# Patient Record
Sex: FEMALE | Race: WHITE | Employment: FULL TIME | URBAN - METROPOLITAN AREA
[De-identification: names, ages, dates, MRNs, and addresses within clinical notes are randomized per-mention and may not be internally consistent; named-entity substitution may affect disease eponyms.]

---

## 2017-01-03 ENCOUNTER — TELEPHONE (OUTPATIENT)
Dept: FAMILY MEDICINE CLINIC | Age: 32
End: 2017-01-03

## 2017-01-25 ENCOUNTER — OFFICE VISIT (OUTPATIENT)
Dept: ORTHOPEDIC SURGERY | Age: 32
End: 2017-01-25

## 2017-01-25 VITALS
WEIGHT: 293 LBS | DIASTOLIC BLOOD PRESSURE: 75 MMHG | SYSTOLIC BLOOD PRESSURE: 115 MMHG | HEART RATE: 79 BPM | HEIGHT: 65 IN | BODY MASS INDEX: 48.82 KG/M2 | RESPIRATION RATE: 16 BRPM

## 2017-01-25 DIAGNOSIS — G56.01 CARPAL TUNNEL SYNDROME OF RIGHT WRIST: Primary | ICD-10-CM

## 2017-01-25 PROCEDURE — 20526 THER INJECTION CARP TUNNEL: CPT | Performed by: ORTHOPAEDIC SURGERY

## 2017-01-25 PROCEDURE — 99243 OFF/OP CNSLTJ NEW/EST LOW 30: CPT | Performed by: ORTHOPAEDIC SURGERY

## 2017-01-25 RX ORDER — METHOCARBAMOL 500 MG/1
500 TABLET, FILM COATED ORAL 4 TIMES DAILY PRN
COMMUNITY
Start: 2016-11-17 | End: 2017-08-28

## 2017-01-25 RX ORDER — FERROUS SULFATE 325(65) MG
325 TABLET ORAL
COMMUNITY
End: 2017-08-28

## 2017-01-25 RX ORDER — CYCLOBENZAPRINE HCL 10 MG
TABLET ORAL
COMMUNITY
Start: 2016-12-15 | End: 2017-05-15

## 2017-02-21 ENCOUNTER — TELEPHONE (OUTPATIENT)
Dept: FAMILY MEDICINE CLINIC | Age: 32
End: 2017-02-21

## 2017-02-21 DIAGNOSIS — E03.9 ACQUIRED HYPOTHYROIDISM: Primary | ICD-10-CM

## 2017-02-22 DIAGNOSIS — E03.9 ACQUIRED HYPOTHYROIDISM: ICD-10-CM

## 2017-02-23 LAB
T4 TOTAL: 7.1 UG/DL (ref 4.5–10.9)
TSH REFLEX: 2.58 UIU/ML (ref 0.27–4.2)

## 2017-02-23 RX ORDER — LEVOTHYROXINE SODIUM 88 UG/1
88 TABLET ORAL DAILY
Qty: 30 TABLET | Refills: 5 | Status: SHIPPED | OUTPATIENT
Start: 2017-02-23 | End: 2017-02-27 | Stop reason: SDUPTHER

## 2017-02-25 ENCOUNTER — PATIENT MESSAGE (OUTPATIENT)
Dept: FAMILY MEDICINE CLINIC | Age: 32
End: 2017-02-25

## 2017-02-27 RX ORDER — LEVOTHYROXINE SODIUM 88 UG/1
88 TABLET ORAL DAILY
Qty: 30 TABLET | Refills: 5 | Status: SHIPPED | OUTPATIENT
Start: 2017-02-27 | End: 2017-08-07 | Stop reason: SDUPTHER

## 2017-04-04 ENCOUNTER — TELEPHONE (OUTPATIENT)
Dept: BARIATRICS/WEIGHT MGMT | Age: 32
End: 2017-04-04

## 2017-04-11 ENCOUNTER — OFFICE VISIT (OUTPATIENT)
Dept: BARIATRICS/WEIGHT MGMT | Age: 32
End: 2017-04-11

## 2017-04-11 VITALS
SYSTOLIC BLOOD PRESSURE: 139 MMHG | DIASTOLIC BLOOD PRESSURE: 82 MMHG | HEART RATE: 81 BPM | WEIGHT: 288.5 LBS | BODY MASS INDEX: 48.07 KG/M2 | RESPIRATION RATE: 16 BRPM | HEIGHT: 65 IN

## 2017-04-11 DIAGNOSIS — E28.2 PCOS (POLYCYSTIC OVARIAN SYNDROME): ICD-10-CM

## 2017-04-11 DIAGNOSIS — G47.33 SLEEP APNEA, OBSTRUCTIVE: ICD-10-CM

## 2017-04-11 DIAGNOSIS — E66.01 MORBID OBESITY WITH BMI OF 45.0-49.9, ADULT (HCC): Primary | ICD-10-CM

## 2017-04-11 PROBLEM — R32 URINARY INCONTINENCE: Status: ACTIVE | Noted: 2017-04-11

## 2017-04-11 PROBLEM — M54.9 BACK PAIN: Status: ACTIVE | Noted: 2017-04-11

## 2017-04-11 PROCEDURE — 99244 OFF/OP CNSLTJ NEW/EST MOD 40: CPT | Performed by: SURGERY

## 2017-04-21 ENCOUNTER — HOSPITAL ENCOUNTER (OUTPATIENT)
Dept: OTHER | Age: 32
Discharge: HOME | End: 2017-04-21
Attending: SURGERY | Admitting: SURGERY

## 2017-04-21 DIAGNOSIS — G47.33 SLEEP APNEA, OBSTRUCTIVE: ICD-10-CM

## 2017-04-21 DIAGNOSIS — E66.01 MORBID OBESITY WITH BMI OF 45.0-49.9, ADULT (HCC): ICD-10-CM

## 2017-04-21 DIAGNOSIS — E28.2 PCOS (POLYCYSTIC OVARIAN SYNDROME): ICD-10-CM

## 2017-04-21 LAB
A/G RATIO: 1.4 (ref 1.1–2.2)
ALBUMIN SERPL-MCNC: 4.5 G/DL (ref 3.4–5)
ALP BLD-CCNC: 37 U/L (ref 40–129)
ALT SERPL-CCNC: 51 U/L (ref 10–40)
ANION GAP SERPL CALCULATED.3IONS-SCNC: 16 MMOL/L (ref 3–16)
AST SERPL-CCNC: 23 U/L (ref 15–37)
BASOPHILS ABSOLUTE: 0.1 K/UL (ref 0–0.2)
BASOPHILS RELATIVE PERCENT: 1.1 %
BILIRUB SERPL-MCNC: 0.4 MG/DL (ref 0–1)
BUN BLDV-MCNC: 11 MG/DL (ref 7–20)
CALCIUM SERPL-MCNC: 9.5 MG/DL (ref 8.3–10.6)
CHLORIDE BLD-SCNC: 101 MMOL/L (ref 99–110)
CHOLESTEROL, TOTAL: 210 MG/DL (ref 0–199)
CO2: 25 MMOL/L (ref 21–32)
CREAT SERPL-MCNC: 0.6 MG/DL (ref 0.6–1.1)
EOSINOPHILS ABSOLUTE: 0.2 K/UL (ref 0–0.6)
EOSINOPHILS RELATIVE PERCENT: 2.1 %
FOLATE: >20 NG/ML (ref 4.78–24.2)
GFR AFRICAN AMERICAN: >60
GFR NON-AFRICAN AMERICAN: >60
GLOBULIN: 3.3 G/DL
GLUCOSE BLD-MCNC: 109 MG/DL (ref 70–99)
HCT VFR BLD CALC: 39.9 % (ref 36–48)
HDLC SERPL-MCNC: 40 MG/DL (ref 40–60)
HEMOGLOBIN: 13.1 G/DL (ref 12–16)
IRON SATURATION: 15 % (ref 15–50)
IRON: 69 UG/DL (ref 37–145)
LDL CHOLESTEROL CALCULATED: 144 MG/DL
LYMPHOCYTES ABSOLUTE: 2.2 K/UL (ref 1–5.1)
LYMPHOCYTES RELATIVE PERCENT: 30.2 %
MCH RBC QN AUTO: 27.7 PG (ref 26–34)
MCHC RBC AUTO-ENTMCNC: 32.9 G/DL (ref 31–36)
MCV RBC AUTO: 84.2 FL (ref 80–100)
MONOCYTES ABSOLUTE: 0.4 K/UL (ref 0–1.3)
MONOCYTES RELATIVE PERCENT: 5.6 %
NEUTROPHILS ABSOLUTE: 4.5 K/UL (ref 1.7–7.7)
NEUTROPHILS RELATIVE PERCENT: 61 %
PDW BLD-RTO: 13.6 % (ref 12.4–15.4)
PLATELET # BLD: 285 K/UL (ref 135–450)
PMV BLD AUTO: 8.1 FL (ref 5–10.5)
POTASSIUM SERPL-SCNC: 4.7 MMOL/L (ref 3.5–5.1)
RBC # BLD: 4.73 M/UL (ref 4–5.2)
SODIUM BLD-SCNC: 142 MMOL/L (ref 136–145)
TOTAL IRON BINDING CAPACITY: 453 UG/DL (ref 260–445)
TOTAL PROTEIN: 7.8 G/DL (ref 6.4–8.2)
TRIGL SERPL-MCNC: 128 MG/DL (ref 0–150)
TSH REFLEX: 1.2 UIU/ML (ref 0.27–4.2)
VITAMIN B-12: 436 PG/ML (ref 211–911)
VITAMIN D 25-HYDROXY: 31 NG/ML
VLDLC SERPL CALC-MCNC: 26 MG/DL
WBC # BLD: 7.3 K/UL (ref 4–11)

## 2017-04-22 LAB
ESTIMATED AVERAGE GLUCOSE: 128.4 MG/DL
HBA1C MFR BLD: 6.1 %

## 2017-05-07 LAB — H PYLORI ANTIGEN STOOL: NEGATIVE

## 2017-05-10 ENCOUNTER — OFFICE VISIT (OUTPATIENT)
Dept: ORTHOPEDIC SURGERY | Age: 32
End: 2017-05-10

## 2017-05-10 VITALS
HEART RATE: 85 BPM | WEIGHT: 288 LBS | SYSTOLIC BLOOD PRESSURE: 137 MMHG | BODY MASS INDEX: 43.65 KG/M2 | DIASTOLIC BLOOD PRESSURE: 88 MMHG | HEIGHT: 68 IN

## 2017-05-10 DIAGNOSIS — G56.01 CARPAL TUNNEL SYNDROME OF RIGHT WRIST: ICD-10-CM

## 2017-05-10 PROCEDURE — 99214 OFFICE O/P EST MOD 30 MIN: CPT | Performed by: ORTHOPAEDIC SURGERY

## 2017-05-11 ENCOUNTER — OFFICE VISIT (OUTPATIENT)
Dept: FAMILY MEDICINE CLINIC | Age: 32
End: 2017-05-11

## 2017-05-11 VITALS
HEIGHT: 65 IN | BODY MASS INDEX: 47.15 KG/M2 | RESPIRATION RATE: 16 BRPM | WEIGHT: 283 LBS | OXYGEN SATURATION: 99 % | DIASTOLIC BLOOD PRESSURE: 74 MMHG | HEART RATE: 97 BPM | SYSTOLIC BLOOD PRESSURE: 124 MMHG

## 2017-05-11 DIAGNOSIS — E66.01 MORBID OBESITY WITH BMI OF 45.0-49.9, ADULT (HCC): Primary | ICD-10-CM

## 2017-05-11 PROCEDURE — 99212 OFFICE O/P EST SF 10 MIN: CPT | Performed by: NURSE PRACTITIONER

## 2017-05-11 ASSESSMENT — PATIENT HEALTH QUESTIONNAIRE - PHQ9
SUM OF ALL RESPONSES TO PHQ QUESTIONS 1-9: 0
SUM OF ALL RESPONSES TO PHQ9 QUESTIONS 1 & 2: 0
2. FEELING DOWN, DEPRESSED OR HOPELESS: 0
1. LITTLE INTEREST OR PLEASURE IN DOING THINGS: 0

## 2017-05-15 ENCOUNTER — HOSPITAL ENCOUNTER (OUTPATIENT)
Dept: OTHER | Age: 32
Discharge: HOME | End: 2017-05-15
Attending: SURGERY | Admitting: SURGERY

## 2017-05-15 ENCOUNTER — OFFICE VISIT (OUTPATIENT)
Dept: BARIATRICS/WEIGHT MGMT | Age: 32
End: 2017-05-15

## 2017-05-15 ENCOUNTER — OFFICE VISIT (OUTPATIENT)
Dept: FAMILY MEDICINE CLINIC | Age: 32
End: 2017-05-15

## 2017-05-15 VITALS
HEIGHT: 65 IN | RESPIRATION RATE: 16 BRPM | WEIGHT: 283 LBS | BODY MASS INDEX: 47.15 KG/M2 | HEART RATE: 96 BPM | DIASTOLIC BLOOD PRESSURE: 68 MMHG | SYSTOLIC BLOOD PRESSURE: 132 MMHG

## 2017-05-15 VITALS
BODY MASS INDEX: 46.98 KG/M2 | RESPIRATION RATE: 16 BRPM | SYSTOLIC BLOOD PRESSURE: 116 MMHG | TEMPERATURE: 98.7 F | OXYGEN SATURATION: 99 % | WEIGHT: 282 LBS | HEART RATE: 90 BPM | HEIGHT: 65 IN | DIASTOLIC BLOOD PRESSURE: 82 MMHG

## 2017-05-15 DIAGNOSIS — E66.01 MORBID OBESITY WITH BMI OF 45.0-49.9, ADULT (HCC): ICD-10-CM

## 2017-05-15 DIAGNOSIS — G47.33 SLEEP APNEA, OBSTRUCTIVE: ICD-10-CM

## 2017-05-15 DIAGNOSIS — G56.01 CARPAL TUNNEL SYNDROME OF RIGHT WRIST: ICD-10-CM

## 2017-05-15 DIAGNOSIS — R73.03 PREDIABETES: ICD-10-CM

## 2017-05-15 DIAGNOSIS — E66.01 MORBID OBESITY WITH BMI OF 45.0-49.9, ADULT (HCC): Primary | ICD-10-CM

## 2017-05-15 DIAGNOSIS — Z01.818 PRE-OP EXAMINATION: Primary | ICD-10-CM

## 2017-05-15 DIAGNOSIS — E28.2 PCOS (POLYCYSTIC OVARIAN SYNDROME): ICD-10-CM

## 2017-05-15 PROCEDURE — 99213 OFFICE O/P EST LOW 20 MIN: CPT | Performed by: NURSE PRACTITIONER

## 2017-05-15 PROCEDURE — 99243 OFF/OP CNSLTJ NEW/EST LOW 30: CPT | Performed by: NURSE PRACTITIONER

## 2017-05-15 ASSESSMENT — ENCOUNTER SYMPTOMS
ALLERGIC/IMMUNOLOGIC NEGATIVE: 1
EYES NEGATIVE: 1
BACK PAIN: 1
RESPIRATORY NEGATIVE: 1
GASTROINTESTINAL NEGATIVE: 1

## 2017-05-19 ENCOUNTER — PAT TELEPHONE (OUTPATIENT)
Dept: PREADMISSION TESTING | Age: 32
End: 2017-05-19

## 2017-05-19 VITALS — HEIGHT: 65 IN | WEIGHT: 282 LBS | BODY MASS INDEX: 46.98 KG/M2

## 2017-05-19 ASSESSMENT — PAIN SCALES - GENERAL: PAINLEVEL_OUTOF10: 6

## 2017-05-19 ASSESSMENT — PAIN DESCRIPTION - LOCATION: LOCATION: BACK

## 2017-05-19 ASSESSMENT — PAIN DESCRIPTION - PAIN TYPE: TYPE: ACUTE PAIN

## 2017-05-19 ASSESSMENT — PAIN DESCRIPTION - DESCRIPTORS: DESCRIPTORS: ACHING;TINGLING;NUMBNESS

## 2017-05-19 ASSESSMENT — PAIN DESCRIPTION - ORIENTATION: ORIENTATION: UPPER;MID

## 2017-05-19 ASSESSMENT — PAIN - FUNCTIONAL ASSESSMENT: PAIN_FUNCTIONAL_ASSESSMENT: 0-10

## 2017-05-23 ENCOUNTER — HOSPITAL ENCOUNTER (OUTPATIENT)
Dept: SURGERY | Age: 32
Discharge: HOME | End: 2017-05-23
Attending: ORTHOPAEDIC SURGERY | Admitting: ORTHOPAEDIC SURGERY

## 2017-05-23 VITALS
HEART RATE: 66 BPM | RESPIRATION RATE: 14 BRPM | SYSTOLIC BLOOD PRESSURE: 147 MMHG | OXYGEN SATURATION: 98 % | TEMPERATURE: 97.2 F | DIASTOLIC BLOOD PRESSURE: 89 MMHG

## 2017-05-23 LAB — PREGNANCY, URINE: NEGATIVE

## 2017-05-23 RX ORDER — ONDANSETRON 2 MG/ML
4 INJECTION INTRAMUSCULAR; INTRAVENOUS
Status: ACTIVE | OUTPATIENT
Start: 2017-05-23 | End: 2017-05-23

## 2017-05-23 RX ORDER — OXYCODONE HYDROCHLORIDE 5 MG/1
10 TABLET ORAL PRN
Status: ACTIVE | OUTPATIENT
Start: 2017-05-23 | End: 2017-05-23

## 2017-05-23 RX ORDER — OXYCODONE HYDROCHLORIDE 5 MG/1
5 TABLET ORAL PRN
Status: ACTIVE | OUTPATIENT
Start: 2017-05-23 | End: 2017-05-23

## 2017-05-23 RX ORDER — SODIUM CHLORIDE 0.9 % (FLUSH) 0.9 %
10 SYRINGE (ML) INJECTION EVERY 12 HOURS SCHEDULED
Status: DISCONTINUED | OUTPATIENT
Start: 2017-05-23 | End: 2017-05-24 | Stop reason: HOSPADM

## 2017-05-23 RX ORDER — MORPHINE SULFATE 2 MG/ML
1 INJECTION, SOLUTION INTRAMUSCULAR; INTRAVENOUS EVERY 5 MIN PRN
Status: DISCONTINUED | OUTPATIENT
Start: 2017-05-23 | End: 2017-05-24 | Stop reason: HOSPADM

## 2017-05-23 RX ORDER — MEPERIDINE HYDROCHLORIDE 25 MG/ML
12.5 INJECTION INTRAMUSCULAR; INTRAVENOUS; SUBCUTANEOUS EVERY 5 MIN PRN
Status: DISCONTINUED | OUTPATIENT
Start: 2017-05-23 | End: 2017-05-24 | Stop reason: HOSPADM

## 2017-05-23 RX ORDER — FENTANYL CITRATE 50 UG/ML
50 INJECTION, SOLUTION INTRAMUSCULAR; INTRAVENOUS EVERY 5 MIN PRN
Status: DISCONTINUED | OUTPATIENT
Start: 2017-05-23 | End: 2017-05-24 | Stop reason: HOSPADM

## 2017-05-23 RX ORDER — SODIUM CHLORIDE 9 MG/ML
INJECTION, SOLUTION INTRAVENOUS CONTINUOUS
Status: DISCONTINUED | OUTPATIENT
Start: 2017-05-23 | End: 2017-05-24 | Stop reason: HOSPADM

## 2017-05-23 RX ORDER — FENTANYL CITRATE 50 UG/ML
25 INJECTION, SOLUTION INTRAMUSCULAR; INTRAVENOUS EVERY 5 MIN PRN
Status: DISCONTINUED | OUTPATIENT
Start: 2017-05-23 | End: 2017-05-24 | Stop reason: HOSPADM

## 2017-05-23 RX ORDER — MORPHINE SULFATE 2 MG/ML
2 INJECTION, SOLUTION INTRAMUSCULAR; INTRAVENOUS EVERY 5 MIN PRN
Status: DISCONTINUED | OUTPATIENT
Start: 2017-05-23 | End: 2017-05-24 | Stop reason: HOSPADM

## 2017-05-23 RX ORDER — SODIUM CHLORIDE 0.9 % (FLUSH) 0.9 %
10 SYRINGE (ML) INJECTION PRN
Status: DISCONTINUED | OUTPATIENT
Start: 2017-05-23 | End: 2017-05-24 | Stop reason: HOSPADM

## 2017-05-23 RX ADMIN — SODIUM CHLORIDE: 9 INJECTION, SOLUTION INTRAVENOUS at 10:38

## 2017-05-23 ASSESSMENT — PAIN SCALES - GENERAL
PAINLEVEL_OUTOF10: 0

## 2017-05-23 ASSESSMENT — ENCOUNTER SYMPTOMS: SHORTNESS OF BREATH: 0

## 2017-05-23 ASSESSMENT — PAIN - FUNCTIONAL ASSESSMENT: PAIN_FUNCTIONAL_ASSESSMENT: 0-10

## 2017-06-02 ENCOUNTER — OFFICE VISIT (OUTPATIENT)
Dept: ORTHOPEDIC SURGERY | Age: 32
End: 2017-06-02

## 2017-06-02 VITALS — BODY MASS INDEX: 46.98 KG/M2 | RESPIRATION RATE: 16 BRPM | HEIGHT: 65 IN | WEIGHT: 282 LBS

## 2017-06-02 DIAGNOSIS — G56.01 CARPAL TUNNEL SYNDROME OF RIGHT WRIST: Primary | ICD-10-CM

## 2017-06-02 PROCEDURE — 99024 POSTOP FOLLOW-UP VISIT: CPT | Performed by: PHYSICIAN ASSISTANT

## 2017-06-19 ENCOUNTER — OFFICE VISIT (OUTPATIENT)
Dept: BARIATRICS/WEIGHT MGMT | Age: 32
End: 2017-06-19

## 2017-06-19 VITALS
SYSTOLIC BLOOD PRESSURE: 110 MMHG | HEART RATE: 66 BPM | DIASTOLIC BLOOD PRESSURE: 60 MMHG | WEIGHT: 288 LBS | BODY MASS INDEX: 47.98 KG/M2 | HEIGHT: 65 IN | RESPIRATION RATE: 16 BRPM

## 2017-06-19 DIAGNOSIS — G47.33 SLEEP APNEA, OBSTRUCTIVE: ICD-10-CM

## 2017-06-19 DIAGNOSIS — R73.03 PREDIABETES: ICD-10-CM

## 2017-06-19 DIAGNOSIS — E66.01 MORBID OBESITY WITH BMI OF 45.0-49.9, ADULT (HCC): Primary | ICD-10-CM

## 2017-06-19 PROCEDURE — 99213 OFFICE O/P EST LOW 20 MIN: CPT | Performed by: NURSE PRACTITIONER

## 2017-06-19 ASSESSMENT — ENCOUNTER SYMPTOMS
GASTROINTESTINAL NEGATIVE: 1
RESPIRATORY NEGATIVE: 1
ALLERGIC/IMMUNOLOGIC NEGATIVE: 1
EYES NEGATIVE: 1

## 2017-07-24 ENCOUNTER — OFFICE VISIT (OUTPATIENT)
Dept: BARIATRICS/WEIGHT MGMT | Age: 32
End: 2017-07-24

## 2017-07-24 VITALS
HEART RATE: 85 BPM | SYSTOLIC BLOOD PRESSURE: 132 MMHG | DIASTOLIC BLOOD PRESSURE: 82 MMHG | WEIGHT: 287 LBS | BODY MASS INDEX: 47.82 KG/M2 | HEIGHT: 65 IN

## 2017-07-24 DIAGNOSIS — E66.01 MORBID OBESITY WITH BMI OF 45.0-49.9, ADULT (HCC): Primary | ICD-10-CM

## 2017-07-24 DIAGNOSIS — R73.03 PREDIABETES: ICD-10-CM

## 2017-07-24 DIAGNOSIS — G47.33 SLEEP APNEA, OBSTRUCTIVE: ICD-10-CM

## 2017-07-24 DIAGNOSIS — E28.2 PCOS (POLYCYSTIC OVARIAN SYNDROME): ICD-10-CM

## 2017-07-24 PROCEDURE — 99213 OFFICE O/P EST LOW 20 MIN: CPT | Performed by: NURSE PRACTITIONER

## 2017-07-24 ASSESSMENT — ENCOUNTER SYMPTOMS
GASTROINTESTINAL NEGATIVE: 1
RESPIRATORY NEGATIVE: 1
EYES NEGATIVE: 1
ALLERGIC/IMMUNOLOGIC NEGATIVE: 1

## 2017-08-05 ENCOUNTER — TELEPHONE (OUTPATIENT)
Dept: FAMILY MEDICINE CLINIC | Age: 32
End: 2017-08-05

## 2017-08-07 RX ORDER — LEVOTHYROXINE SODIUM 88 UG/1
88 TABLET ORAL DAILY
Qty: 30 TABLET | Refills: 5 | Status: SHIPPED | OUTPATIENT
Start: 2017-08-07 | End: 2018-03-29 | Stop reason: SDUPTHER

## 2017-08-21 ENCOUNTER — OFFICE VISIT (OUTPATIENT)
Dept: BARIATRICS/WEIGHT MGMT | Age: 32
End: 2017-08-21

## 2017-08-21 VITALS
RESPIRATION RATE: 16 BRPM | HEIGHT: 65 IN | DIASTOLIC BLOOD PRESSURE: 86 MMHG | WEIGHT: 280 LBS | BODY MASS INDEX: 46.65 KG/M2 | HEART RATE: 98 BPM | SYSTOLIC BLOOD PRESSURE: 112 MMHG

## 2017-08-21 DIAGNOSIS — R73.03 PREDIABETES: ICD-10-CM

## 2017-08-21 DIAGNOSIS — E66.01 MORBID OBESITY WITH BMI OF 45.0-49.9, ADULT (HCC): Primary | ICD-10-CM

## 2017-08-21 DIAGNOSIS — G47.33 SLEEP APNEA, OBSTRUCTIVE: ICD-10-CM

## 2017-08-21 PROCEDURE — 99213 OFFICE O/P EST LOW 20 MIN: CPT | Performed by: NURSE PRACTITIONER

## 2017-08-21 ASSESSMENT — ENCOUNTER SYMPTOMS
EYES NEGATIVE: 1
RESPIRATORY NEGATIVE: 1
GASTROINTESTINAL NEGATIVE: 1
ALLERGIC/IMMUNOLOGIC NEGATIVE: 1

## 2017-08-28 ENCOUNTER — OFFICE VISIT (OUTPATIENT)
Dept: FAMILY MEDICINE CLINIC | Age: 32
End: 2017-08-28

## 2017-08-28 VITALS
WEIGHT: 284.4 LBS | HEIGHT: 65 IN | BODY MASS INDEX: 47.38 KG/M2 | HEART RATE: 82 BPM | RESPIRATION RATE: 16 BRPM | OXYGEN SATURATION: 97 % | DIASTOLIC BLOOD PRESSURE: 60 MMHG | SYSTOLIC BLOOD PRESSURE: 104 MMHG | TEMPERATURE: 97.9 F

## 2017-08-28 DIAGNOSIS — Z00.00 WELL WOMAN EXAM WITHOUT GYNECOLOGICAL EXAM: Primary | ICD-10-CM

## 2017-08-28 DIAGNOSIS — G89.29 CHRONIC MIDLINE THORACIC BACK PAIN: ICD-10-CM

## 2017-08-28 DIAGNOSIS — M54.6 CHRONIC MIDLINE THORACIC BACK PAIN: ICD-10-CM

## 2017-08-28 DIAGNOSIS — E66.01 MORBID OBESITY WITH BMI OF 45.0-49.9, ADULT (HCC): ICD-10-CM

## 2017-08-28 DIAGNOSIS — E03.9 ACQUIRED HYPOTHYROIDISM: ICD-10-CM

## 2017-08-28 PROCEDURE — 99395 PREV VISIT EST AGE 18-39: CPT | Performed by: NURSE PRACTITIONER

## 2017-08-28 PROCEDURE — 90471 IMMUNIZATION ADMIN: CPT | Performed by: NURSE PRACTITIONER

## 2017-08-28 PROCEDURE — 90688 IIV4 VACCINE SPLT 0.5 ML IM: CPT | Performed by: NURSE PRACTITIONER

## 2017-08-28 RX ORDER — CYCLOBENZAPRINE HCL 5 MG
5 TABLET ORAL 3 TIMES DAILY PRN
Qty: 30 TABLET | Refills: 0 | Status: SHIPPED | OUTPATIENT
Start: 2017-08-28 | End: 2020-06-19

## 2017-09-20 ENCOUNTER — TELEPHONE (OUTPATIENT)
Dept: BARIATRICS/WEIGHT MGMT | Age: 32
End: 2017-09-20

## 2017-09-25 ENCOUNTER — OFFICE VISIT (OUTPATIENT)
Dept: BARIATRICS/WEIGHT MGMT | Age: 32
End: 2017-09-25

## 2017-09-25 VITALS
HEART RATE: 80 BPM | SYSTOLIC BLOOD PRESSURE: 114 MMHG | BODY MASS INDEX: 46.82 KG/M2 | HEIGHT: 65 IN | RESPIRATION RATE: 16 BRPM | WEIGHT: 281 LBS | DIASTOLIC BLOOD PRESSURE: 74 MMHG

## 2017-09-25 DIAGNOSIS — R73.03 PREDIABETES: ICD-10-CM

## 2017-09-25 DIAGNOSIS — E78.5 HYPERLIPIDEMIA, UNSPECIFIED: ICD-10-CM

## 2017-09-25 DIAGNOSIS — E28.2 PCOS (POLYCYSTIC OVARIAN SYNDROME): ICD-10-CM

## 2017-09-25 DIAGNOSIS — E66.01 MORBID OBESITY WITH BMI OF 45.0-49.9, ADULT (HCC): Primary | ICD-10-CM

## 2017-09-25 DIAGNOSIS — G47.33 SLEEP APNEA, OBSTRUCTIVE: ICD-10-CM

## 2017-09-25 PROCEDURE — 99213 OFFICE O/P EST LOW 20 MIN: CPT | Performed by: NURSE PRACTITIONER

## 2017-09-25 ASSESSMENT — ENCOUNTER SYMPTOMS
RESPIRATORY NEGATIVE: 1
ALLERGIC/IMMUNOLOGIC NEGATIVE: 1
EYES NEGATIVE: 1
GASTROINTESTINAL NEGATIVE: 1

## 2017-10-16 ENCOUNTER — TELEPHONE (OUTPATIENT)
Dept: BARIATRICS/WEIGHT MGMT | Age: 32
End: 2017-10-16

## 2017-10-16 ENCOUNTER — OFFICE VISIT (OUTPATIENT)
Dept: FAMILY MEDICINE CLINIC | Age: 32
End: 2017-10-16

## 2017-10-16 VITALS
BODY MASS INDEX: 48.05 KG/M2 | OXYGEN SATURATION: 99 % | WEIGHT: 288.4 LBS | SYSTOLIC BLOOD PRESSURE: 116 MMHG | RESPIRATION RATE: 17 BRPM | HEIGHT: 65 IN | DIASTOLIC BLOOD PRESSURE: 74 MMHG | HEART RATE: 77 BPM

## 2017-10-16 DIAGNOSIS — M25.512 ACUTE PAIN OF LEFT SHOULDER: ICD-10-CM

## 2017-10-16 DIAGNOSIS — K58.0 IRRITABLE BOWEL SYNDROME WITH DIARRHEA: ICD-10-CM

## 2017-10-16 DIAGNOSIS — M25.551 RIGHT HIP PAIN: Primary | ICD-10-CM

## 2017-10-16 PROCEDURE — 90471 IMMUNIZATION ADMIN: CPT | Performed by: NURSE PRACTITIONER

## 2017-10-16 PROCEDURE — 99214 OFFICE O/P EST MOD 30 MIN: CPT | Performed by: NURSE PRACTITIONER

## 2017-10-16 PROCEDURE — 90686 IIV4 VACC NO PRSV 0.5 ML IM: CPT | Performed by: NURSE PRACTITIONER

## 2017-10-16 RX ORDER — DIPHENOXYLATE HYDROCHLORIDE AND ATROPINE SULFATE 2.5; .025 MG/1; MG/1
1 TABLET ORAL 3 TIMES DAILY PRN
Qty: 30 TABLET | Refills: 0 | Status: SHIPPED | OUTPATIENT
Start: 2017-10-16 | End: 2017-11-20 | Stop reason: SDUPTHER

## 2017-10-16 RX ORDER — ACETAMINOPHEN 325 MG/1
650 TABLET ORAL PRN
COMMUNITY

## 2017-10-16 ASSESSMENT — ENCOUNTER SYMPTOMS
VOMITING: 0
DIARRHEA: 1
BLOOD IN STOOL: 0
BACK PAIN: 0
NAUSEA: 0
SHORTNESS OF BREATH: 0
ABDOMINAL PAIN: 0

## 2017-10-16 NOTE — TELEPHONE ENCOUNTER
[facility] approval for sleeve started  DOS 11/27/17  CPT [phone]    Nurse reviewer will call with fax number

## 2017-10-16 NOTE — PATIENT INSTRUCTIONS
and treatment. Follow-up care is a key part of your treatment and safety. Be sure to make and go to all appointments, and call your doctor if you are having problems. It's also a good idea to know your test results and keep a list of the medicines you take. How can you care for yourself at home? · Take pain medicines exactly as directed. ¨ If the doctor gave you a prescription medicine for pain, take it as prescribed. ¨ If you are not taking a prescription pain medicine, ask your doctor if you can take an over-the-counter medicine. ¨ Do not take two or more pain medicines at the same time unless the doctor told you to. Many pain medicines contain acetaminophen, which is Tylenol. Too much acetaminophen (Tylenol) can be harmful. · If your doctor recommends that you wear a sling, use it as directed. Do not take it off before your doctor tells you to. · Put ice or a cold pack on the sore area for 10 to 20 minutes at a time. Put a thin cloth between the ice and your skin. · If there is no swelling, you can put moist heat, a heating pad, or a warm cloth on your shoulder. Some doctors suggest alternating between hot and cold. · Rest your shoulder for a few days. If your doctor recommends it, you can then begin gentle exercise of the shoulder, but do not lift anything heavy. When should you call for help? Call 911 anytime you think you may need emergency care. For example, call if:  · You have chest pain or pressure. This may occur with:  ¨ Sweating. ¨ Shortness of breath. ¨ Nausea or vomiting. ¨ Pain that spreads from the chest to the neck, jaw, or one or both shoulders or arms. ¨ Dizziness or lightheadedness. ¨ A fast or uneven pulse. After calling 911, chew 1 adult-strength aspirin. Wait for an ambulance. Do not try to drive yourself. · Your arm or hand is cool or pale or changes color.   Call your doctor now or seek immediate medical care if:  · You have signs of infection, such as:  ¨ Increased pain, swelling, warmth, or redness in your shoulder. ¨ Red streaks leading from a place on your shoulder. ¨ Pus draining from an area of your shoulder. ¨ Swollen lymph nodes in your neck, armpits, or groin. ¨ A fever. Watch closely for changes in your health, and be sure to contact your doctor if:  · You cannot use your shoulder. · Your shoulder does not get better as expected. Where can you learn more? Go to https://[employer]ackerHAND. org and sign in to your [employer] account. Enter D793 in the Immediately box to learn more about \"Shoulder Pain: Care Instructions. \"     If you do not have an account, please click on the \"Sign Up Now\" link. Current as of: March 21, 2017  Content Version: 11.3  © 4840-1953 [employer]. Care instructions adapted under license by [facility] ([facility]). If you have questions about a medical condition or this instruction, always ask your healthcare professional. [place] [address] any warranty or liability for your use of this information.

## 2017-10-16 NOTE — PROGRESS NOTES
SUBJECTIVE:  Pt is a of 29 y.o. female comes in today with   Chief Complaint   Patient presents with    Hip Pain     x 2 months, R, stiff and painful after sitting down for a while, centralized in joint, does not radiate down leg    Shoulder Pain     x 1 month, L, hurts after lifting over head       Patient presenting today for evaluation of right hip pain. Right hip pain: present for 2 months. Denies injury. Pain intermittent throughout the day. Can readjust and get pain free for short while. Worsens when first standing and walking. Takes 5-10 steps to improve. Pain radiates to upper lateral thigh and groin. Occasional numbness and tingling in leg. No recent eval. Tyl with moderate improvement. Left shoulder pain: started approx 4 weeks ago. Severe last week, slightly better now. Pain increases when lifting son or raising arm above head. Worsened after doing her hair and mother's hair for wedding over the weekend. Severe pain when reaching behind back. Denies CP, SOB or palpitations. C/o diarrhea since gb was removed. Relates to IBS. At times not controlled by Imodium. Denies abd pain or weight loss. Denies mucus or blood in stool. No previous eval.      Prior to Visit Medications    Medication Sig Taking? Authorizing Provider   acetaminophen (TYLENOL) 325 MG tablet Take 650 mg by mouth as needed for Pain Yes Historical Provider, MD   cyclobenzaprine (FLEXERIL) 5 MG tablet Take 1 tablet by mouth 3 times daily as needed for Muscle spasms Yes Pawel Mcleod CNP   levothyroxine (SYNTHROID) 88 MCG tablet Take 1 tablet by mouth daily Yes Pawel Mcleod CNP   Levonorgestrel (MIRENA, 52 MG, IU) by Intrauterine route Yes Historical Provider, MD   Prenatal Vit-Fe Fumarate-FA (PRENATAL VITAMIN PO) Take 1 capsule by mouth daily  Yes Historical Provider, MD         Patient's allergies, past medical, surgical, social and family histories were reviewed and updated as appropriate.       Review of Systems Constitutional: Negative for fever. Respiratory: Negative for shortness of breath. Cardiovascular: Negative for chest pain. Gastrointestinal: Positive for diarrhea. Negative for abdominal pain, blood in stool, melena, nausea and vomiting. Musculoskeletal: Positive for joint pain (right hip pain). Negative for back pain. Left shoulder pain     Neurological: Negative for tingling. Physical Exam   Constitutional: She is oriented to person, place, and time. She appears well-developed and well-nourished. Cardiovascular: Normal rate, regular rhythm and normal heart sounds. Pulmonary/Chest: Effort normal and breath sounds normal.   Abdominal: Soft. Normal appearance and bowel sounds are normal. There is no tenderness. There is no rigidity, no rebound and no guarding. Musculoskeletal:        Left shoulder: She exhibits decreased range of motion and pain. She exhibits no tenderness, no swelling, normal pulse and normal strength. Right hip: She exhibits decreased range of motion and tenderness. She exhibits normal strength and no swelling. Neurological: She is alert and oriented to person, place, and time. Skin: Skin is warm and dry. Psychiatric: She has a normal mood and affect. Her behavior is normal.   Vitals reviewed. Vitals:    10/16/17 1523   BP: 116/74   Site: Left Arm   Position: Sitting   Cuff Size: Large Adult   Pulse: 77   Resp: 17   SpO2: 99%   Weight: 288 lb 6.4 oz (130.8 kg)   Height: 5' 5\" (1.651 m)             ASSESSMENT:  1. Right hip pain    2. Acute pain of left shoulder    3. Irritable bowel syndrome with diarrhea        PLAN:  1. Right hip pain  New problem  Apply a compressive ACE bandage. Rest and elevate the affected painful area. Apply cold compresses intermittently as needed. As pain recedes, begin normal activities slowly as tolerated.   Call if symptoms persist.  Tyl/Ibu prn pain  -     Amauri Rascon MD (Orthopedic Surgery)    2. Acute pain of left shoulder  New problem  Apply a compressive ACE bandage. Rest and elevate the affected painful area. Apply cold compresses intermittently as needed. As pain recedes, begin normal activities slowly as tolerated. Call if symptoms persist.  Tyl/Ibu prn pain  -     Dacia Smith MD (Orthopedic Surgery)    3. Irritable bowel syndrome with diarrhea  Trial diphenoxylate-atropine (DIPHENATOL) 2.5-0.025 MG per tablet; Take 1 tablet by mouth 3 times daily as needed for Diarrhea    F/u prn  See pt instructions  Discussed use, benefit, and side effects of prescribed medications. All patient questions answered. Pt voiced understanding.

## 2017-10-17 ENCOUNTER — OFFICE VISIT (OUTPATIENT)
Dept: BARIATRICS/WEIGHT MGMT | Age: 32
End: 2017-10-17

## 2017-10-17 VITALS
SYSTOLIC BLOOD PRESSURE: 124 MMHG | HEART RATE: 60 BPM | RESPIRATION RATE: 16 BRPM | HEIGHT: 65 IN | BODY MASS INDEX: 47.65 KG/M2 | DIASTOLIC BLOOD PRESSURE: 72 MMHG | WEIGHT: 286 LBS

## 2017-10-17 DIAGNOSIS — E66.01 MORBID OBESITY WITH BMI OF 45.0-49.9, ADULT (HCC): Primary | ICD-10-CM

## 2017-10-17 PROCEDURE — 99999 PR OFFICE/OUTPT VISIT,PROCEDURE ONLY: CPT | Performed by: NURSE PRACTITIONER

## 2017-10-17 NOTE — PROGRESS NOTES
Sandy Dallas gained 5 lbs over the past month. After dietary evaluation and education, patient is considered to be a good surgical candidate from a dietary perspective. Patient was educated on gastric sleeve dietary protocol. Pre-operative and post-operative diet guidelines were discussed and written information was provided. Nectar and Unjury protein supplements were purchased. Vitamin regimen with Bariatric Fusion vitamins was explained, and patient purchased a 1 month supply at this visit. Importance of vitamin compliance was discussed and potential of vitamin deficiencies was reviewed. Encouraged continued physical activity. Patient signed agreement stating they are committed to lifelong follow up, smoking and alcohol cessation, vitamin compliance, and 2 week pre-operative dietary protocol.

## 2017-10-23 ENCOUNTER — TELEPHONE (OUTPATIENT)
Dept: BARIATRICS/WEIGHT MGMT | Age: 32
End: 2017-10-23

## 2017-11-06 ENCOUNTER — HOSPITAL ENCOUNTER (OUTPATIENT)
Dept: PHYSICAL THERAPY | Age: 32
Discharge: HOME | End: 2017-11-30
Attending: ORTHOPAEDIC SURGERY | Admitting: ORTHOPAEDIC SURGERY

## 2017-11-06 NOTE — FLOWSHEET NOTE
[facility]  Orthopaedics and Sports Rehabilitation, [place]    Physical Therapy  Cancellation/No-show Note  Patient Name:  Betty Wiseman  :  1985   Date:  2017  Cancelled visits to date:1  No-shows to date: 0    For today's appointment patient:  [x]  Cancelled  []  Rescheduled appointment  []  No-show     Reason given by patient:  []  Patient ill  []  Conflicting appointment  []  No transportation    [x]  Conflict with work  []  No reason given  []  Other:     Comments:  Pt. Still out of town.      Electronically signed by:  Zonia Patterson, PT

## 2017-11-07 ENCOUNTER — HOSPITAL ENCOUNTER (OUTPATIENT)
Dept: PHYSICAL THERAPY | Age: 32
Discharge: HOME | End: 2017-11-30
Admitting: ORTHOPAEDIC SURGERY

## 2017-11-07 NOTE — FLOWSHEET NOTE
Functional goals:  [] Patient is progressing as expected towards functional goals listed. [] Progression is slowed due to complexities listed. [] Progression has been slowed due to co-morbidities.   [x] Plan just implemented, too soon to assess goals progression  [] Other:     Persisting Functional Limitations/Impairments:  []Sitting []Standing   [x]Walking [x]Squatting/bending    []Stairs []ADL's    [x]Transfers []Reaching  []Housework []Job related tasks  [x]Driving []Sports/Recreation   []Other:    ASSESSMENT:  See eval  Treatment/Activity Tolerance:  [x] Patient tolerated treatment well [] Patient limited by fatique  [] Patient limited by pain  [] Patient limited by other medical complications  [] Other:     Prognosis: [] Good [x] Fair  [] Poor    Patient Requires Follow-up: [x] Yes  [] No    Return to Play:    [x]  N/A   []  Stage 1: Intro to Strength   []  Stage 2: Return to Run and Strength   []  Stage 3: Return to Jump and Strength   []  Stage 4: Dynamic Strength and Agility   []  Stage 5: Sport Specific Training     []  Ready to Return to Play, Meets All Above Stages   []  Not Ready for Return to Sports   Comments:            PLAN: See eval  [] Continue per plan of care [] Alter current plan (see comments)  [x] Plan of care initiated [] Hold pending MD visit [] Discharge    Electronically signed by: Kenya Guzman PT, DPT

## 2017-11-07 NOTE — PLAN OF CARE
with trochanteric bursitis. Pt notes that she had an injection which helped. Pt notes her family just got back from [facility] last night, and the pain gradually started to come back with prolonged driving and walking on vacation. Pt notes her pain is a constant dull ache/\"squeezing\" pain. Pt notes the pain can last for around 10+ min. Pt denies any N/T in that area. Pt notes she does feel stiffness and occasional weakness in the (R) LE/hip. Pt notes she has bariatric surgery on 11/27. Relevant Medical History: , Morbid Obesity, Hypothyroidism  Functional Disability Index:PT G-Codes  Functional Assessment Tool Used: LEFS  Score: 46% LOF  Functional Limitation: Mobility: Walking and moving around  Mobility: Walking and Moving Around Current Status (): At least 40 percent but less than 60 percent impaired, limited or restricted  Mobility: Walking and Moving Around Goal Status ():  At least 20 percent but less than 40 percent impaired, limited or restricted    Pain Scale:  4/10 currently   6/10 at worst  Easing factors: ice, sitting in recliner  Provocative factors: prolonged driving, walking, squatting, lying on the (R) side    Type: [x]Constant   []Intermittent  []Radiating [x]Localized []other:     Numbness/Tingling: none    Occupation/School: shop owner for furniture, 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    Living Status/Prior Level of Function:Prior to this injury / incident, pt was independent with ADLs and IADLs,  Taking care of 17 month old, normal housework activities, normal work activities      OBJECTIVE:   Palpation: TTP over (R) greater trochanter    Functional Mobility/Transfers: independent    Posture: WFL      Gait: (include devices/WB status) WFL    Dermatomes Normal Abnormal Comments   inguinal area (L1)  y     anterior mid-thigh (L2) y     distal ant thigh/med knee (L3) y     medial lower leg and foot (L4) y     lateral lower leg and foot (L5) y     posterior calf (S1) y     medial calcaneus (S2) y conditions   [x]Hypothyroid (E03.9)  []Hyperthyroid Gastrointestinal conditions   []Constipation (M10.56)   Metabolic conditions   [x]Morbid obesity (E66.01)  []Diabetes type 1(E10.65) or 2 (E11.65)   []Neuropathy (G60.9)     Pulmonary conditions   []Asthma (J45)  []Coughing   []COPD (J44.9)   Psychological Disorders  []Anxiety (F41.9)  []Depression (F32.9)   []Other:   []Other:          Barriers to/and or personal factors that will affect rehab potential:              []Age  []Sex    []Smoker              [x]Motivation/Lack of Motivation                        [x]Co-Morbidities              []Cognitive Function, education/learning barriers              []Environmental, home barriers              []profession/work barriers  []past PT/medical experience  []other:  Justification:    Falls Risk Assessment (30 days):   [x] Falls Risk assessed and no intervention required. [] Falls Risk assessed and Patient requires intervention due to being higher risk   TUG score (>12s at risk):     [] Falls education provided, including       G-Codes:  PT G-Codes  Functional Assessment Tool Used: LEFS  Score: 46% LOF  Functional Limitation: Mobility: Walking and moving around  Mobility: Walking and Moving Around Current Status (): At least 40 percent but less than 60 percent impaired, limited or restricted  Mobility: Walking and Moving Around Goal Status ():  At least 20 percent but less than 40 percent impaired, limited or restricted    ASSESSMENT:  Functional Impairments:     []Noted lumbar/proximal hip/LE joint hypomobility   [x]Decreased LE functional ROM   [x]Decreased core/proximal hip strength and neuromuscular control   [x]Decreased LE functional strength   []Reduced balance/proprioceptive control   []other:      Functional Activity Limitations (from functional questionnaire and intake)   []Reduced ability to tolerate prolonged functional positions   []Reduced ability or difficulty with changes of positions or

## 2017-11-13 ENCOUNTER — HOSPITAL ENCOUNTER (OUTPATIENT)
Dept: OTHER | Age: 32
Discharge: HOME | End: 2017-11-13
Attending: SURGERY | Admitting: SURGERY

## 2017-11-13 ENCOUNTER — HOSPITAL ENCOUNTER (OUTPATIENT)
Dept: PHYSICAL THERAPY | Age: 32
Discharge: HOME | End: 2017-11-13
Admitting: ORTHOPAEDIC SURGERY

## 2017-11-13 LAB
A/G RATIO: 1.3 (ref 1.1–2.2)
ABO/RH: NORMAL
ALBUMIN SERPL-MCNC: 4.2 G/DL (ref 3.4–5)
ALP BLD-CCNC: 40 U/L (ref 40–129)
ALT SERPL-CCNC: 54 U/L (ref 10–40)
ANION GAP SERPL CALCULATED.3IONS-SCNC: 13 MMOL/L (ref 3–16)
ANTIBODY SCREEN: NORMAL
AST SERPL-CCNC: 25 U/L (ref 15–37)
BILIRUB SERPL-MCNC: 0.7 MG/DL (ref 0–1)
BUN BLDV-MCNC: 17 MG/DL (ref 7–20)
CALCIUM SERPL-MCNC: 9.7 MG/DL (ref 8.3–10.6)
CHLORIDE BLD-SCNC: 100 MMOL/L (ref 99–110)
CO2: 26 MMOL/L (ref 21–32)
CREAT SERPL-MCNC: 0.6 MG/DL (ref 0.6–1.1)
EKG ATRIAL RATE: 49 BPM
EKG DIAGNOSIS: NORMAL
EKG P AXIS: 20 DEGREES
EKG P-R INTERVAL: 128 MS
EKG Q-T INTERVAL: 438 MS
EKG QRS DURATION: 84 MS
EKG QTC CALCULATION (BAZETT): 395 MS
EKG R AXIS: 53 DEGREES
EKG T AXIS: 36 DEGREES
EKG VENTRICULAR RATE: 49 BPM
GFR AFRICAN AMERICAN: >60
GFR NON-AFRICAN AMERICAN: >60
GLOBULIN: 3.3 G/DL
GLUCOSE BLD-MCNC: 90 MG/DL (ref 70–99)
HCT VFR BLD CALC: 39.9 % (ref 36–48)
HEMOGLOBIN: 13.2 G/DL (ref 12–16)
MCH RBC QN AUTO: 29 PG (ref 26–34)
MCHC RBC AUTO-ENTMCNC: 33.1 G/DL (ref 31–36)
MCV RBC AUTO: 87.5 FL (ref 80–100)
PDW BLD-RTO: 14.3 % (ref 12.4–15.4)
PLATELET # BLD: 215 K/UL (ref 135–450)
PMV BLD AUTO: 8 FL (ref 5–10.5)
POTASSIUM SERPL-SCNC: 4.2 MMOL/L (ref 3.5–5.1)
RBC # BLD: 4.56 M/UL (ref 4–5.2)
SODIUM BLD-SCNC: 139 MMOL/L (ref 136–145)
TOTAL PROTEIN: 7.5 G/DL (ref 6.4–8.2)
WBC # BLD: 8.4 K/UL (ref 4–11)

## 2017-11-13 PROCEDURE — 93010 ELECTROCARDIOGRAM REPORT: CPT | Performed by: INTERNAL MEDICINE

## 2017-11-13 NOTE — FLOWSHEET NOTE
[facility]  Orthopaedics and Sports Rehabilitation, [place]    Physical Therapy Daily Treatment Note  Date:  2017    Patient Name:  Sonny Manuel    :  1985  MRN: [id number]  Medical/Treatment Diagnosis Information:  Diagnosis: M70.61 Creater Trochanteric Bursitis (R)  Treatment Diagnosis: M70.61 Greater Trochanteric Bursitis (R)  Insurance/Certification information:  PT Insurance Information: [facility]  Physician Information:  Referring Practitioner: Dr. Evonne Alpers of care signed (Y/N):     Date of Patient follow up with Physician:     G-Code (if applicable):      Date G-Code Applied:  17  PT G-Codes  Functional Assessment Tool Used: LEFS  Score: 46% LOF  Functional Limitation: Mobility: Walking and moving around  Mobility: Walking and Moving Around Current Status (): At least 40 percent but less than 60 percent impaired, limited or restricted  Mobility: Walking and Moving Around Goal Status (): At least 20 percent but less than 40 percent impaired, limited or restricted    Progress Note: []  Yes  [x]  No  Next due by: Visit #10       Latex Allergy:  [x]NO      []YES  Preferred Language for Healthcare:   [x]English       []other:    Visit # Insurance Allowable   2 25     Pain level:  4/10 currently     SUBJECTIVE:   Pt reports that she continues to have pain when driving, especially in her smaller car. Pt notes it is easier when in the SUV. Pt reports she has been doing her exercises and has the most difficulty with sidelying abduction.     OBJECTIVE:     Tissue restrictions (R) TFL, IT Band, greatest just distal to greater trochanter  Mild hypomobility noted (R) hip        RESTRICTIONS/PRECAUTIONS:  none    Exercises/Interventions:     Therapeutic Exercises  Resistance / level Sets/sec Reps Notes   Supine Piriformis Stretch  30\" 3 HEP   Standing TFL Stretch  30\" 3 HEP   Seated Hamstring Stretch  30\" 3 HEP   Supine Bridging Green band around knees 3 10 HEP Sidelying Abduction 1# 3 10 HEP ^ 11/13   Sidelying clamshells 5# 3 10 HEP Adjusted 11/3   Leg Press Green band    Added 11/13   Stationary Bike L3 5' warmup  Added 11/13                        Neuromuscular Re-ed / Therapeutic Activities                            Manual Intervention       Knee mobs/PROM       Tib/Fem Mobs       Patella Mobs       Ankle mobs       TFL/IT Band STM Foam roller 6'     (R) hip LAD Gr III 3'         Therapeutic Exercise and NMR EXR  [x] (78226) Provided verbal/tactile cueing for activities related to strengthening, flexibility, endurance, ROM for improvements in LE, proximal hip, and core control with self care, mobility, lifting, ambulation.  [] (45526) Provided verbal/tactile cueing for activities related to improving balance, coordination, kinesthetic sense, posture, motor skill, proprioception  to assist with LE, proximal hip, and core control in self care, mobility, lifting, ambulation and eccentric single leg control.      NMR and Therapeutic Activities:    [] (97341 or 75387) Provided verbal/tactile cueing for activities related to improving balance, coordination, kinesthetic sense, posture, motor skill, proprioception and motor activation to allow for proper function of core, proximal hip and LE with self care and ADLs  [] (57187) Gait Re-education- Provided training and instruction to the patient for proper LE, core and proximal hip recruitment and positioning and eccentric body weight control with ambulation re-education including up and down stairs     Home Exercise Program:    [x] (73446) Reviewed/Progressed HEP activities related to strengthening, flexibility, endurance, ROM of core, proximal hip and LE for functional self-care, mobility, lifting and ambulation/stair navigation   [] (02865)Reviewed/Progressed HEP activities related to improving balance, coordination, kinesthetic sense, posture, motor skill, proprioception of core, proximal hip and LE for self care, mobility, lifting, and ambulation/stair navigation      Manual Treatments:  PROM / STM / Oscillations-Mobs:  G-I, II, III, IV (PA's, Inf., Post.)  [] (89074) Provided manual therapy to mobilize LE, proximal hip and/or LS spine soft tissue/joints for the purpose of modulating pain, promoting relaxation,  increasing ROM, reducing/eliminating soft tissue swelling/inflammation/restriction, improving soft tissue extensibility and allowing for proper ROM for normal function with self care, mobility, lifting and ambulation. Modalities:  [] (53742) Vasopneumatic compression: Utilized vasopneumatic compression to decrease edema / swelling for the purpose of improving mobility and quad tone / recruitment which will allow for increased overall function including but not limited to self-care, transfers, ambulation, and ascending / descending stairs. Modalities:  CP x 10       Charges:  Timed Code Treatment Minutes: 40'   Total Treatment Minutes: 61'     [] EVAL - LOW (69368)   [] EVAL - MOD (91789)  [] EVAL - HIGH (82478)  [] RE-EVAL (95207)  [x] AB(52977) x  2   [] IONTO  [] NMR (12694) x      [] VASO  [x] Manual (99750) x  1    [] Other:  [] TA x       [] Mech Traction (39200)  [] ES(attended) (89971)      [] ES (un) (67540):     GOALS:  Patient stated goal:  Help get rid of the pain    Therapist goals for Patient:   Short Term Goals[de-identified] To be achieved in: 2 weeks  1. Independent in HEP and progression per patient tolerance, in order to prevent re-injury. 2. Patient will have a decrease in pain to facilitate improvement in movement, function, and ADLs as indicated by Functional Deficits. Long Term Goals: To be achieved in: 6 weeks  1. Disability index score of 23% or less for the LEFS to assist with reaching prior level of function. 2. Patient will demonstrate increased AROM to [facility] in all planes to allow for proper joint functioning as indicated by patients Functional Deficits.    3. Patient will demonstrate an increase in Strength to at least 4+/5 (R) hip abduction strength to allow for proper functional mobility as indicated by patients Functional Deficits. 4. Patient will return to functional activities including  Being able to tolerate prolonged walking community distances of 20 min or > without increased symptoms or restriction. Progression Towards Functional goals:  [x] Patient is progressing as expected towards functional goals listed. [] Progression is slowed due to complexities listed. [] Progression has been slowed due to co-morbidities. [] Plan just implemented, too soon to assess goals progression  [] Other:     Persisting Functional Limitations/Impairments:  []Sitting []Standing   [x]Walking [x]Squatting/bending    []Stairs []ADL's    [x]Transfers []Reaching  []Housework []Job related tasks  [x]Driving []Sports/Recreation   []Other:    ASSESSMENT:  Greatest tissue restrictions and reported tenderness noted just distal to (R) greater trochanter. Pt was better able to maintain form with sidelying abduction today. Pt would continue to benefit from working on strengthening proximal hip musculature and general flexibility to help restore function.   Treatment/Activity Tolerance:  [x] Patient tolerated treatment well [] Patient limited by fatique  [] Patient limited by pain  [] Patient limited by other medical complications  [] Other:     Prognosis: [] Good [x] Fair  [] Poor    Patient Requires Follow-up: [x] Yes  [] No    Return to Play:    [x]  N/A   []  Stage 1: Intro to Strength   []  Stage 2: Return to Run and Strength   []  Stage 3: Return to Jump and Strength   []  Stage 4: Dynamic Strength and Agility   []  Stage 5: Sport Specific Training     []  Ready to Return to Play, Meets All Above Stages   []  Not Ready for Return to Sports   Comments:            PLAN:  Consider belt mobs for hip mobility  [x] Continue per plan of care [] Alter current plan (see comments)  [x] Plan of care initiated [] Hold pending MD visit [] Discharge    Electronically signed by: Dayna Jimenez PT, DPT

## 2017-11-17 NOTE — PLAN OF CARE
painfree, unless she lifts objects overhead or sits with her arm elevated too long. Pt notes the pain is an achy pain in the anterior/superior portion of her (L) shoulder. Pt notes it can linger for a few minutes after aggravation. Pt denies any N/T of the (L) shoulder. Relevant Medical History: (R) trochanteric bursitis, morbid obesity  Functional Disability Index:PT G-Codes  Functional Assessment Tool Used: UEFI  Score: 2% LOF  Functional Limitation: Carrying, moving and handling objects  Carrying, Moving and Handling Objects Current Status (): At least 1 percent but less than 20 percent impaired, limited or restricted  Carrying, Moving and Handling Objects Goal Status (): 0 percent impaired, limited or restricted    Pain Scale:  0/10 currently  1/10 at worst  Easing factors:  Re-positioning, ice  Provocative factors:  Prolonged elevated positions of the (L) UE, reaching OH, sleeping on involved side.     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 [employer]    Living Status/Prior Level of Function: Prior to this injury / incident, pt was independent with ADLs and IADLs, Taking care of 17 month old, normal housework activities, normal work activities      OBJECTIVE:   Hand dominance: (L)    Palpation:  TTP over anterior Cuff, TTP at [facility] joint, proximal biceps    Functional Mobility/Transfers:  independent    Posture: rounded shoulders        Dermatomes Normal Abnormal Comments   Top of head (C1) y     Posterior occipital region (C2) y     Side of neck (C3) y     Top of shoulder (C4) y     Lateral deltoid (C5) y     Tip of thumb (C6) y     Distal middle finger (C7) y     Distal fifth finger (C8) y     Medial forearm (T1) y         Myotomes Normal Abnormal Comments   Neck flexion (C1-C2) y     Neck sidebending (C3) y     Shoulder elevation (C4) y     Shoulder abduction (C5) y     Elbow flexion/wrist extension (C6) y     Elbow extension/wrist flexion (C7) y     Thumb abduction (C8) y     Finger abduction (T1) y              PROM AROM    L R L R   Cervical Flexion    WNL    Cervical Extension    WNL    Cervical Rotation   WNL WNL   Cervical Side-bend   WNL WNL   Shoulder Flexion  Full motion, pain WNL Full motion pain at approximately 130 deg WNL   Shoulder Abduction  Full motion, pain WNL Full motion, pain at approximately 130 deg WNL   Shoulder External Rotation  Full motion, pain WNL WNL WNL   Shoulder Internal Rotation  Full motion, pain NWL approx T9 Pain Approx T8   Elbow Flexion  WNL WNL WNL WNL   Elbow Extension  WNL WNL WNL WNL   Pronation    WNL WNL   Supination    WNL WNL   Wrist Flexion    WNL WNL   Wrist Extension    WNL WNL   Radial Deviation        Ulnar Deviation            Strength (0-5) Left Right    Shoulder Flex 5 5   Shoulder Abd 5 5   Shoulder ER 4 pain 5   Shoulder IR 5* pain 5   Biceps 5 5   Triceps 5 5   Lats 4+ 5   Middle Trap 4- 4   Rhomboids 4- 4   Lower Trap  4 4   Pronation  5 5   Supination  5 5            Joint mobility:   [x]Normal    []Hypo   []Hyper    Orthopedic Special Tests: (+) horiz adduction, hawkin's reyna, neers  Empty can,  (-) open can, apprehension, belly press, lift off                                    [x] Patient history, allergies, meds reviewed. Medical chart reviewed. See intake form. Review Of Systems (ROS):  [x]Performed Review of systems (Integumentary, CardioPulmonary, Neurological) by intake and observation. Intake form has been scanned into medical record. Patient has been instructed to contact their primary care physician regarding ROS issues if not already being addressed at this time.       Co-morbidities/Complexities (which will affect course of rehabilitation):   []None           Arthritic conditions   []Rheumatoid arthritis (M05.9)  [x]Osteoarthritis (M19.91)   Cardiovascular conditions   []Hypertension (I10)  []Hyperlipidemia (E78.5)  []Angina pectoris (I20)  []Atherosclerosis (I70)   Musculoskeletal conditions   []Disc pathology   []Congenital spine pathologies   []Prior surgical intervention  []Osteoporosis (M81.8)  []Osteopenia (M85.8)   Endocrine conditions   [x]Hypothyroid (E03.9)  []Hyperthyroid Gastrointestinal conditions   []Constipation (A90.74)   Metabolic conditions   [x]Morbid obesity (E66.01)  []Diabetes type 1(E10.65) or 2 (E11.65)   []Neuropathy (G60.9)     Pulmonary conditions   []Asthma (J45)  []Coughing   []COPD (J44.9)   Psychological Disorders  []Anxiety (F41.9)  []Depression (F32.9)   []Other:   []Other:          Barriers to/and or personal factors that will affect rehab potential:              []Age  []Sex    []Smoker              [x]Motivation/Lack of Motivation                        [x]Co-Morbidities              []Cognitive Function, education/learning barriers              []Environmental, home barriers              []profession/work barriers  []past PT/medical experience  []other:  Justification:      Falls Risk Assessment (30 days):   [x] Falls Risk assessed and no intervention required. [] Falls Risk assessed and Patient requires intervention due to being higher risk   TUG score (>12s at risk):     [] Falls education provided, including       G-Codes:  PT G-Codes  Functional Assessment Tool Used: UEFI  Score: 2% LOF  Functional Limitation: Carrying, moving and handling objects  Carrying, Moving and Handling Objects Current Status ():  At least 1 percent but less than 20 percent impaired, limited or restricted  Carrying, Moving and Handling Objects Goal Status (): 0 percent impaired, limited or restricted    ASSESSMENT:   Functional Impairments   []Noted spinal or UE joint hypomobility   []Noted spinal or UE joint hypermobility   []Decreased UE functional ROM   [x]Decreased UE functional strength   []Abnormal reflexes/sensation/myotomal/dermatomal deficits   [x]Decreased RC/scapular/core strength Justification  [x] A history of present problem with:  [] no personal factors and/or comorbidities that impact the plan of care;  []1-2 personal factors and/or comorbidities that impact the plan of care  [x]3 personal factors and/or comorbidities that impact the plan of care  [x] An examination of body systems using standardized tests and measures addressing any of the following: body structures and functions (impairments), activity limitations, and/or participation restrictions;:  [] a total of 1-2 or more elements   [] a total of 3 or more elements   [x] a total of 4 or more elements   [x] A clinical presentation with:  [x] stable and/or uncomplicated characteristics   [] evolving clinical presentation with changing characteristics  [] unstable and unpredictable characteristics;   [x] Clinical decision making of [x] low, [] moderate, [] high complexity using standardized patient assessment instrument and/or measurable assessment of functional outcome. [x] EVAL (LOW) 01004 (typically 30 minutes face-to-face)  [] EVAL (MOD) 32243 (typically 30 minutes face-to-face)  [] EVAL (HIGH) 28326 (typically 45 minutes face-to-face)  [] RE-EVAL     PLAN:  Frequency/Duration:  1 days per week for 6 Weeks:  INTERVENTIONS:  [x] Therapeutic exercise including: strength training, ROM, for Upper extremity and core   [x]  NMR activation and proprioception for UE, scap and Core   [x] Manual therapy as indicated for shoulder, scapula and spine to include: Dry Needling/IASTM, STM, PROM, Gr I-IV mobilizations, manipulation. [x] Modalities as needed that may include: thermal agents, E-stim, Biofeedback, US, iontophoresis as indicated  [x] Patient education on joint protection, postural re-education, activity modification, progression of HEP. HEP instruction:(see scanned forms)    GOALS:  Patient stated goal:  Get painfree, normal ADLs    Therapist goals for Patient:   Short Term Goals: To be achieved in: 2 weeks  1.  Independent in

## 2017-11-20 ENCOUNTER — OFFICE VISIT (OUTPATIENT)
Dept: FAMILY MEDICINE CLINIC | Age: 32
End: 2017-11-20

## 2017-11-20 VITALS
DIASTOLIC BLOOD PRESSURE: 70 MMHG | OXYGEN SATURATION: 96 % | WEIGHT: 287.2 LBS | TEMPERATURE: 97.9 F | RESPIRATION RATE: 16 BRPM | BODY MASS INDEX: 45.08 KG/M2 | HEART RATE: 75 BPM | SYSTOLIC BLOOD PRESSURE: 100 MMHG | HEIGHT: 67 IN

## 2017-11-20 DIAGNOSIS — Z01.818 PREOP EXAMINATION: Primary | ICD-10-CM

## 2017-11-20 DIAGNOSIS — E03.9 ACQUIRED HYPOTHYROIDISM: ICD-10-CM

## 2017-11-20 DIAGNOSIS — R73.03 PREDIABETES: ICD-10-CM

## 2017-11-20 DIAGNOSIS — E78.5 HYPERLIPIDEMIA, UNSPECIFIED HYPERLIPIDEMIA TYPE: ICD-10-CM

## 2017-11-20 DIAGNOSIS — E66.01 MORBID OBESITY WITH BMI OF 45.0-49.9, ADULT (HCC): ICD-10-CM

## 2017-11-20 DIAGNOSIS — G47.33 SLEEP APNEA, OBSTRUCTIVE: ICD-10-CM

## 2017-11-20 PROCEDURE — G8417 CALC BMI ABV UP PARAM F/U: HCPCS | Performed by: NURSE PRACTITIONER

## 2017-11-20 PROCEDURE — 99214 OFFICE O/P EST MOD 30 MIN: CPT | Performed by: NURSE PRACTITIONER

## 2017-11-20 PROCEDURE — 1036F TOBACCO NON-USER: CPT | Performed by: NURSE PRACTITIONER

## 2017-11-20 PROCEDURE — G8427 DOCREV CUR MEDS BY ELIG CLIN: HCPCS | Performed by: NURSE PRACTITIONER

## 2017-11-20 PROCEDURE — G8484 FLU IMMUNIZE NO ADMIN: HCPCS | Performed by: NURSE PRACTITIONER

## 2017-11-20 RX ORDER — DIPHENOXYLATE HYDROCHLORIDE AND ATROPINE SULFATE 2.5; .025 MG/1; MG/1
1 TABLET ORAL 3 TIMES DAILY PRN
Qty: 30 TABLET | Refills: 0 | COMMUNITY
Start: 2017-11-20 | End: 2019-12-09

## 2017-11-20 NOTE — PATIENT INSTRUCTIONS
Patient Education        How to Prepare for Surgery  How do you prepare for surgery? Surgery can be stressful. This information will help you understand what you can expect. And it will help you safely prepare for surgery. Follow-up care is a key part of your treatment and safety. Be sure to make and go to all appointments, and call your doctor if you are having problems. It's also a good idea to know your test results and keep a list of the medicines you take. What happens before surgery? Preparing for surgery  · Understand exactly what surgery is planned, along with the risks, benefits, and other options. · Tell your doctors ALL the medicines, vitamins, supplements, and herbal remedies you take. Some of these can increase the risk of bleeding or interact with anesthesia. · If you take blood thinners, such as warfarin (Coumadin), clopidogrel (Plavix), or aspirin, be sure to talk to your doctor. He or she will tell you if you should stop taking these medicines before your surgery. Make sure that you understand exactly what your doctor wants you to do. · Your doctor will tell you which medicines to take or stop before your surgery. You may need to stop taking certain medicines a week or more before surgery. So talk to your doctor as soon as you can. · If you have an advance directive, let your doctor know. It may include a living will and a durable power of  for health care. Bring a copy to the hospital. If don't have one, you may want to prepare one. It lets your doctor and loved ones know your health care wishes. Doctors advise that everyone prepare these papers before any type of surgery or procedure. What happens on the day of surgery? · Follow the instructions about when to stop eating and drinking. If you don't, your surgery may be canceled. If your doctor told you to take your medicines on the day of surgery, take them with only a sip of water.   · Take a bath or shower before coming in for your surgery. Do not apply lotions, perfumes, deodorants, or nail polish. · Do not shave the surgical site yourself. · Take off all jewelry and piercings. And take out contact lenses, if you wear them. At the hospital or surgery center  · Bring a picture ID. · The area for surgery is often marked to make sure there are no errors. · You will be kept comfortable and safe by your anesthesia provider. The anesthesia may make you sleep. Or it may just numb the area being worked on. Going home  · Be sure you have someone to drive you home. Anesthesia and pain medicine make it unsafe for you to drive. · You will be given more specific instructions about recovering from your surgery. They will cover things like diet, wound care, follow-up care, driving, and getting back to your normal routine. When should you call your doctor? · You have questions or concerns. · You don't understand how to prepare for your surgery. · You become ill before the surgery (such as fever, flu, or a cold). · You need to reschedule or have changed your mind about having the surgery. Where can you learn more? Go to [email or web address] and sign in to your [employer] account. Enter Q270 in the [employer] box to learn more about \"How to Prepare for Surgery. \"     If you do not have an account, please click on the \"Sign Up Now\" link. Current as of: August 14, 2016  Content Version: 11.3  © 5779-9443 [employer]. Care instructions adapted under license by [facility] [address] If you have questions about a medical condition or this instruction, always ask your healthcare professional. [place] [address] any warranty or liability for your use of this information.

## 2017-11-20 NOTE — PROGRESS NOTES
Arthritis     upper cervical area pain    Carpal tunnel syndrome of right wrist 5/10/2017    Gestational diabetes     pre-diabetic--not medically treated    Hypothyroidism     Taking 75mcg Synthroid daily.  Iron deficiency anemia     Obesity, unspecified     Pancreatitis 2010    PCOS (polycystic ovarian syndrome)     Sleep apnea     requires CPAP while sleeping. Past Surgical History:   Procedure Laterality Date    CARPAL TUNNEL RELEASE Right 2017    Right carpal tunnel release       SECTION  2016    CHOLECYSTECTOMY, LAPAROSCOPIC  2010    [facility]EEP      [place]    SINUS SURGERY      TONSILLECTOMY Bilateral     WISDOM TOOTH EXTRACTION       Family History is not significant for reactions to anesthesia    Family History   Problem Relation Age of Onset    High Blood Pressure Mother     Diabetes Maternal Grandfather     High Blood Pressure Maternal Grandfather     Diabetes Maternal Grandmother     Cancer Maternal Grandmother     Alcohol Abuse Father      alcohol     Cancer Paternal Grandmother      Social History     Social History    Marital status:      Spouse name: N/A    Number of children: N/A    Years of education: N/A     Occupational History    Not on file.      Social History Main Topics    Smoking status: Never Smoker    Smokeless tobacco: Never Used    Alcohol use 0.0 oz/week      Comment: 1 drink every 6 months    Drug use: No    Sexual activity: Yes     Partners: Male     Birth control/ protection: IUD     Other Topics Concern    Not on file     Social History Narrative    No narrative on file       Review of Systems  Constitutional: negative  Eyes: negative  Ears, nose, mouth, throat, and face: negative  Respiratory: negative  Cardiovascular: negative  Gastrointestinal: negative  Genitourinary:negative  Integument/breast: negative  Hematologic/lymphatic: negative  Musculoskeletal:negative  Neurological: negative  Behavioral/Psych: negative  Endocrine: negative     Physical Exam   /70 (Site: Left Arm, Position: Sitting, Cuff Size: Large Adult)   Pulse 75   Temp 97.9 °F (36.6 °C) (Oral)   Resp 16   Ht 5' 7\" (1.702 m)   Wt 287 lb 3.2 oz (130.3 kg)   SpO2 96%   BMI 44.98 kg/m²   Constitutional: Patient is oriented to person, place, and time. She appears well-developed and well-nourished. No distress. Head: Normocephalic and atraumatic. Right Ear: Tympanic membrane, external ear and ear canal normal.   Left Ear: Tympanic membrane, external ear and ear canal normal.   Nose: Nose normal.   Mouth/Throat: Oropharynx is clear and moist, and mucous membranes are normal.  There is no cervical adenopathy. There are no loose teeth. Eyes: Conjunctivae and extraocular motions are normal. Pupils are equal, round, and reactive to light bilaterally. Neck: Neck supple. No JVD present. No carotid bruit present. No mass and no thyromegaly present. Cardiovascular: Normal rate, regular rhythm, normal heart sounds and intact distal pulses. Exam reveals no gallop and no friction rub. No murmur heard. Pulmonary/Chest: Effort normal and breath sounds normal. No respiratory distress. There are no wheezes, rhonchi or rales. Abdominal: Soft, non-tender. Normal bowel sounds and aorta. There is no organomegaly, bruit or palpable mass. Neurological: She is alert and oriented to person, place, and time. No cranial nerve deficit. Coordination normal.   Skin: Skin is warm and dry. There is no rash or erythema. No suspicious lesions noted. Psychiatric: She has a normal mood and affect.  Speech and behavior are normal. Judgment, cognition and memory are normal.        Lab Review   Lab Results   Component Value Date     11/13/2017    K 4.2 11/13/2017     11/13/2017    CO2 26 11/13/2017    BUN 17 11/13/2017    CREATININE 0.6 11/13/2017    GLUCOSE 90 11/13/2017    CALCIUM 9.7 11/13/2017     Lab Results   Component Value Date    WBC 8.4

## 2017-11-21 ENCOUNTER — HOSPITAL ENCOUNTER (OUTPATIENT)
Dept: PHYSICAL THERAPY | Age: 32
Discharge: HOME | End: 2017-11-21
Admitting: ORTHOPAEDIC SURGERY

## 2017-11-21 NOTE — FLOWSHEET NOTE
[facility]  Orthopaedics and Sports Rehabilitation, [place]    Physical Therapy Daily Treatment Note  Date:  2017    Patient Name:  James Roe    :  1985  MRN: [id number]  Medical/Treatment Diagnosis Information:  Diagnosis: M70.61 Creater Trochanteric Bursitis (R)  Treatment Diagnosis: M70.61 Greater Trochanteric Bursitis (R)  Insurance/Certification information:  PT Insurance Information: [facility] Choice  Physician Information:  Referring Practitioner: Dr. Abelardo Lockwood of care signed (Y/N): y    Date of Patient follow up with Physician:     G-Code (if applicable):      Date G-Code Applied:  17  PT G-Codes  Functional Assessment Tool Used: LEFS  Score: 46% LOF  Functional Limitation: Mobility: Walking and moving around  Mobility: Walking and Moving Around Current Status (): At least 40 percent but less than 60 percent impaired, limited or restricted  Mobility: Walking and Moving Around Goal Status (): At least 20 percent but less than 40 percent impaired, limited or restricted    Progress Note: []  Yes  [x]  No  Next due by: Visit #10       Latex Allergy:  [x]NO      []YES  Preferred Language for Healthcare:   [x]English       []other:    Visit # Insurance Allowable   3 25     Pain level:  2/10 currently     SUBJECTIVE:   Pt reports her hip seems to be feeling better. Pt reports she cleaned her whole house on  and did note moderate pain with squatting and heavier activites. In general, pt feels her pain is decreasing. Pt has stopped sidelying abduction due to pain. Pt undergoes bariatric surgery next Monday and will miss at least a week of PT. Pt notes she has attempted to contact her M.D. About precautions following surgery.     OBJECTIVE:     Mild hypomobility remains (R) hip    Slight pinching reported with end range hip flexion (pt noted it went away following belt mobs)        RESTRICTIONS/PRECAUTIONS:  none    Exercises/Interventions: function, and ADLs as indicated by Functional Deficits. Long Term Goals: To be achieved in: 6 weeks  1. Disability index score of 23% or less for the LEFS to assist with reaching prior level of function. 2. Patient will demonstrate increased AROM to [facility] in all planes to allow for proper joint functioning as indicated by patients Functional Deficits. 3. Patient will demonstrate an increase in Strength to at least 4+/5 (R) hip abduction strength to allow for proper functional mobility as indicated by patients Functional Deficits. 4. Patient will return to functional activities including  Being able to tolerate prolonged walking community distances of 20 min or > without increased symptoms or restriction. Progression Towards Functional goals:  [x] Patient is progressing as expected towards functional goals listed. [] Progression is slowed due to complexities listed. [] Progression has been slowed due to co-morbidities. [] Plan just implemented, too soon to assess goals progression  [] Other:     Persisting Functional Limitations/Impairments:  []Sitting []Standing   [x]Walking [x]Squatting/bending    []Stairs []ADL's    [x]Transfers []Reaching  []Housework []Job related tasks  [x]Driving []Sports/Recreation   []Other:    ASSESSMENT:   Pt demonstrated smoother hip mobility with no pain noted following manual therapy today. Pt fatigues quickly with gluteus medius strengthening activities, but demonstrated improved tolerance. Pt appears to be progressing. Pt must continue working on proximal hip strength. However, due to pt's upcoming surgery, PT will be held until cleared to continue per bariatric surgeon.       Treatment/Activity Tolerance:  [x] Patient tolerated treatment well [] Patient limited by fatique  [] Patient limited by pain  [] Patient limited by other medical complications  [] Other:     Prognosis: [] Good [x] Fair  [] Poor    Patient Requires Follow-up: [x] Yes  [] No    Return to Play:

## 2017-11-27 PROBLEM — E66.01 MORBID OBESITY WITH BODY MASS INDEX (BMI) OF 45.0 TO 49.9 IN ADULT (HCC): Status: ACTIVE | Noted: 2017-11-27

## 2017-11-27 PROBLEM — E78.2 HYPERLIPIDEMIA, MIXED: Status: ACTIVE | Noted: 2017-09-25

## 2017-11-28 PROBLEM — Z98.84 S/P LAPAROSCOPIC SLEEVE GASTRECTOMY: Status: ACTIVE | Noted: 2017-11-28

## 2017-12-01 ENCOUNTER — HOSPITAL ENCOUNTER (OUTPATIENT)
Dept: PHYSICAL THERAPY | Age: 32
Discharge: HOME | End: 2017-12-31
Attending: ORTHOPAEDIC SURGERY | Admitting: ORTHOPAEDIC SURGERY

## 2017-12-04 ENCOUNTER — TELEPHONE (OUTPATIENT)
Dept: BARIATRICS/WEIGHT MGMT | Age: 32
End: 2017-12-04

## 2017-12-04 DIAGNOSIS — Z98.84 S/P LAPAROSCOPIC SLEEVE GASTRECTOMY: Primary | ICD-10-CM

## 2017-12-04 NOTE — TELEPHONE ENCOUNTER
I attempted to contact the patient to follow up with them regarding their recent surgery. I have left a voicemail for the patient to return our call. If I am not in office when @he returns my call, please have them speak with a dietician. Thanks!

## 2017-12-12 ENCOUNTER — OFFICE VISIT (OUTPATIENT)
Dept: BARIATRICS/WEIGHT MGMT | Age: 32
End: 2017-12-12

## 2017-12-12 VITALS
DIASTOLIC BLOOD PRESSURE: 65 MMHG | BODY MASS INDEX: 45.08 KG/M2 | WEIGHT: 270.6 LBS | SYSTOLIC BLOOD PRESSURE: 116 MMHG | HEIGHT: 65 IN

## 2017-12-12 DIAGNOSIS — Z98.84 S/P LAPAROSCOPIC SLEEVE GASTRECTOMY: ICD-10-CM

## 2017-12-12 DIAGNOSIS — E66.01 MORBID OBESITY WITH BODY MASS INDEX (BMI) OF 45.0 TO 49.9 IN ADULT (HCC): Primary | ICD-10-CM

## 2017-12-12 PROCEDURE — 99024 POSTOP FOLLOW-UP VISIT: CPT | Performed by: SURGERY

## 2017-12-12 NOTE — PROGRESS NOTES
Dietary Assessment Note    Vitals:   Vitals:    12/12/17 1008   BP: 116/65   Weight: 270 lb 9.6 oz (122.7 kg)   Height: 5' 5\" (1.651 m)    Patient lost 15.4 lbs over past 2 months. Total Weight Loss: 17.4 lbs    Labs reviewed: labs reviewed, I note that Glu 105 H    Protein intake: 60-80 grams/day     Fluid intake: 48 oz/day     Multivitamin/mineral intake: taking 3 x day - wants to try alternative     Calcium intake: taking 1 x day - reports these are too big     Other: none     Exercise: Yes. Walking     Nutrition Assessment: 2 week s/p sleeve post-op visit. Reports she can't handle the protein powder from our office because it makes her sick to her stomach (reports it is not getting stirred up completely), so she has been utilizing premier protein and clear protein drinks. She is also utilizing unflavored protein powder- unable to recall name/type of protein but reports 1Tbsp = 30 g/pro. She is eating 4-6 x day including protein shakes. Breakfast: cottage cheese, or scrambled eggs     Snack: meat and cheese (pureed)     Lunch: HB eggs and cottage cheese, or chili, or yogurt     Snack:    Dinner: chicken and mashed potatoes,     Snack: SF jello     Fluids:  Powerade zero, clear protein drinks, crystal light (reports regular water gives heartburn/cramping-temperature does not make a difference)     Amount able to eat at a time? Once she ate 4 oz, but otherwise eating 1-2 oz. Following 30-30-30 Rule? Taking 15-30 minutes, does not eat and drink at the same time     Consuming only full liquids/Pureed foods? Yes     Food Intolerances/issues: none    Client Concerns: wants to try alternative MVI. Goals: Phase 3 diet reviewed and provided for pt to advance to at 3 weeks, take 4 MVI/day and can avoid Calcium, remain at 1-2 oz, increase fluid intake, review ingredients within protein powder.      Plan: F/U at 6 weeks    Rufus Goyal

## 2018-01-10 ENCOUNTER — OFFICE VISIT (OUTPATIENT)
Dept: BARIATRICS/WEIGHT MGMT | Age: 33
End: 2018-01-10

## 2018-01-10 VITALS
SYSTOLIC BLOOD PRESSURE: 114 MMHG | HEART RATE: 70 BPM | RESPIRATION RATE: 16 BRPM | BODY MASS INDEX: 41.82 KG/M2 | DIASTOLIC BLOOD PRESSURE: 76 MMHG | WEIGHT: 251 LBS | HEIGHT: 65 IN

## 2018-01-10 DIAGNOSIS — G47.33 SLEEP APNEA, OBSTRUCTIVE: ICD-10-CM

## 2018-01-10 DIAGNOSIS — R73.03 PREDIABETES: ICD-10-CM

## 2018-01-10 DIAGNOSIS — E66.01 MORBID OBESITY WITH BMI OF 40.0-44.9, ADULT (HCC): ICD-10-CM

## 2018-01-10 DIAGNOSIS — Z98.84 S/P LAPAROSCOPIC SLEEVE GASTRECTOMY: Primary | ICD-10-CM

## 2018-01-10 DIAGNOSIS — E78.2 HYPERLIPIDEMIA, MIXED: ICD-10-CM

## 2018-01-10 PROCEDURE — 99024 POSTOP FOLLOW-UP VISIT: CPT | Performed by: NURSE PRACTITIONER

## 2018-01-10 ASSESSMENT — ENCOUNTER SYMPTOMS
RESPIRATORY NEGATIVE: 1
ALLERGIC/IMMUNOLOGIC NEGATIVE: 1
EYES NEGATIVE: 1
ROS SKIN COMMENTS: SURGICAL INCISIONS.
GASTROINTESTINAL NEGATIVE: 1

## 2018-01-10 NOTE — PROGRESS NOTES
Dietary Assessment Note    Vitals:   Vitals:    01/10/18 0929   BP: 114/76   Pulse: 70   Resp: 16   Weight: 251 lb (113.9 kg)   Height: 5' 5\" (1.651 m)    Patient lost 19.6 lbs over past month. She was sick last week, she was also throwing up     Total Weight Loss: 37 lbs     Labs reviewed: labs reviewed, I note that Glu 105 H    Protein intake: 60-80 grams/day     Fluid intake: 48-64 oz/day    Multivitamin/mineral intake: Taking fusion 4 x day     Calcium intake: none    Other: none     Exercise: Yes. Treadmill on three occasions before getting sick, twice since feeling better- for 30 minutes. Nutrition Assessment: 6 week s/p sleeve post-op visit. She is eating 5-6 x day. Tracking intakes on [employer]. Breakfast: Premier protein shake or premier clear or low fat cottage cheese and HB egg, or scrambled eggs with cheese     Snack: unsweetened applesauce with cinnamon     Lunch: chicken and green beans, or baked fish with green beans     Snack: cheese stick     Dinner: tuna and saltine crackers     Snack: SF popsicles     Fluids: Water    Amount able to eat at a time? 2 oz     Following 30-30-30 Rule? Eats in 15 minutes, does not eat and drink at the same time     Consuming soft/mushy food only?  Yes     Food Intolerances/issues: none    Client Concerns: questions about next stage of diet     Goals: Provided and reviewed Phase 4 handout for pt to advance to, utilize premier clear protein drink vs shake, slow down eating     Plan: F/U at 12 weeks    Salud Erazo

## 2018-02-19 ENCOUNTER — OFFICE VISIT (OUTPATIENT)
Dept: BARIATRICS/WEIGHT MGMT | Age: 33
End: 2018-02-19

## 2018-02-19 VITALS
BODY MASS INDEX: 39.99 KG/M2 | WEIGHT: 240 LBS | HEIGHT: 65 IN | HEART RATE: 60 BPM | SYSTOLIC BLOOD PRESSURE: 113 MMHG | DIASTOLIC BLOOD PRESSURE: 64 MMHG

## 2018-02-19 DIAGNOSIS — R73.03 PREDIABETES: Primary | ICD-10-CM

## 2018-02-19 DIAGNOSIS — Z98.84 S/P LAPAROSCOPIC SLEEVE GASTRECTOMY: ICD-10-CM

## 2018-02-19 DIAGNOSIS — E03.9 ACQUIRED HYPOTHYROIDISM: ICD-10-CM

## 2018-02-19 DIAGNOSIS — K21.9 CHRONIC GERD: ICD-10-CM

## 2018-02-19 DIAGNOSIS — E78.2 HYPERLIPIDEMIA, MIXED: ICD-10-CM

## 2018-02-19 PROBLEM — E66.9 OBESITY (BMI 30-39.9): Status: ACTIVE | Noted: 2018-02-19

## 2018-02-19 PROCEDURE — 99024 POSTOP FOLLOW-UP VISIT: CPT | Performed by: NURSE PRACTITIONER

## 2018-02-19 RX ORDER — OMEPRAZOLE 20 MG/1
20 CAPSULE, DELAYED RELEASE ORAL DAILY
Qty: 30 CAPSULE | Refills: 3 | Status: SHIPPED | OUTPATIENT
Start: 2018-02-19 | End: 2020-06-02 | Stop reason: SDUPTHER

## 2018-02-19 ASSESSMENT — ENCOUNTER SYMPTOMS
ALLERGIC/IMMUNOLOGIC NEGATIVE: 1
RESPIRATORY NEGATIVE: 1
EYES NEGATIVE: 1
GASTROINTESTINAL NEGATIVE: 1

## 2018-02-28 ENCOUNTER — TELEPHONE (OUTPATIENT)
Dept: BARIATRICS/WEIGHT MGMT | Age: 33
End: 2018-02-28

## 2018-02-28 DIAGNOSIS — E03.9 ACQUIRED HYPOTHYROIDISM: ICD-10-CM

## 2018-02-28 LAB — TSH REFLEX: 1.01 UIU/ML (ref 0.27–4.2)

## 2018-02-28 NOTE — TELEPHONE ENCOUNTER
Spoke with patient to let her know that her TSH (1.01) was normal, but it is decreasing each time. Will recheck with 6 months labs to continue to review trend. Patient verbalized understanding and had no further questions.

## 2018-05-21 ENCOUNTER — OFFICE VISIT (OUTPATIENT)
Dept: BARIATRICS/WEIGHT MGMT | Age: 33
End: 2018-05-21

## 2018-05-21 VITALS
DIASTOLIC BLOOD PRESSURE: 74 MMHG | WEIGHT: 228 LBS | HEART RATE: 59 BPM | HEIGHT: 65 IN | BODY MASS INDEX: 37.99 KG/M2 | SYSTOLIC BLOOD PRESSURE: 134 MMHG

## 2018-05-21 DIAGNOSIS — G47.33 SLEEP APNEA, OBSTRUCTIVE: ICD-10-CM

## 2018-05-21 DIAGNOSIS — E78.2 HYPERLIPIDEMIA, MIXED: ICD-10-CM

## 2018-05-21 DIAGNOSIS — K21.9 CHRONIC GERD: ICD-10-CM

## 2018-05-21 DIAGNOSIS — R73.03 PREDIABETES: ICD-10-CM

## 2018-05-21 DIAGNOSIS — Z98.84 S/P LAPAROSCOPIC SLEEVE GASTRECTOMY: Primary | ICD-10-CM

## 2018-05-21 PROCEDURE — G8417 CALC BMI ABV UP PARAM F/U: HCPCS | Performed by: NURSE PRACTITIONER

## 2018-05-21 PROCEDURE — G8427 DOCREV CUR MEDS BY ELIG CLIN: HCPCS | Performed by: NURSE PRACTITIONER

## 2018-05-21 PROCEDURE — 99213 OFFICE O/P EST LOW 20 MIN: CPT | Performed by: NURSE PRACTITIONER

## 2018-05-21 PROCEDURE — 1036F TOBACCO NON-USER: CPT | Performed by: NURSE PRACTITIONER

## 2018-05-21 ASSESSMENT — ENCOUNTER SYMPTOMS
EYES NEGATIVE: 1
ALLERGIC/IMMUNOLOGIC NEGATIVE: 1
GASTROINTESTINAL NEGATIVE: 1
RESPIRATORY NEGATIVE: 1

## 2018-05-30 DIAGNOSIS — E78.2 HYPERLIPIDEMIA, MIXED: ICD-10-CM

## 2018-05-30 DIAGNOSIS — Z98.84 S/P LAPAROSCOPIC SLEEVE GASTRECTOMY: ICD-10-CM

## 2018-05-30 DIAGNOSIS — R73.03 PREDIABETES: ICD-10-CM

## 2018-05-30 LAB
A/G RATIO: 2 (ref 1.1–2.2)
ALBUMIN SERPL-MCNC: 4.8 G/DL (ref 3.4–5)
ALP BLD-CCNC: 34 U/L (ref 40–129)
ALT SERPL-CCNC: 21 U/L (ref 10–40)
ANION GAP SERPL CALCULATED.3IONS-SCNC: 13 MMOL/L (ref 3–16)
AST SERPL-CCNC: 16 U/L (ref 15–37)
BILIRUB SERPL-MCNC: 0.8 MG/DL (ref 0–1)
BUN BLDV-MCNC: 13 MG/DL (ref 7–20)
CALCIUM SERPL-MCNC: 9.5 MG/DL (ref 8.3–10.6)
CHLORIDE BLD-SCNC: 103 MMOL/L (ref 99–110)
CHOLESTEROL, TOTAL: 178 MG/DL (ref 0–199)
CO2: 27 MMOL/L (ref 21–32)
CREAT SERPL-MCNC: 0.6 MG/DL (ref 0.6–1.1)
FOLATE: 10.01 NG/ML (ref 4.78–24.2)
GFR AFRICAN AMERICAN: >60
GFR NON-AFRICAN AMERICAN: >60
GLOBULIN: 2.4 G/DL
GLUCOSE BLD-MCNC: 91 MG/DL (ref 70–99)
HDLC SERPL-MCNC: 42 MG/DL (ref 40–60)
IRON SATURATION: 37 % (ref 15–50)
IRON: 143 UG/DL (ref 37–145)
LDL CHOLESTEROL CALCULATED: 117 MG/DL
POTASSIUM SERPL-SCNC: 4.5 MMOL/L (ref 3.5–5.1)
SODIUM BLD-SCNC: 143 MMOL/L (ref 136–145)
TOTAL IRON BINDING CAPACITY: 385 UG/DL (ref 260–445)
TOTAL PROTEIN: 7.2 G/DL (ref 6.4–8.2)
TRIGL SERPL-MCNC: 95 MG/DL (ref 0–150)
TSH REFLEX: 2.04 UIU/ML (ref 0.27–4.2)
VITAMIN B-12: 469 PG/ML (ref 211–911)
VITAMIN D 25-HYDROXY: 27.1 NG/ML
VLDLC SERPL CALC-MCNC: 19 MG/DL

## 2018-05-31 LAB
ESTIMATED AVERAGE GLUCOSE: 111.2 MG/DL
HBA1C MFR BLD: 5.5 %

## 2018-06-01 ENCOUNTER — TELEPHONE (OUTPATIENT)
Dept: BARIATRICS/WEIGHT MGMT | Age: 33
End: 2018-06-01

## 2018-08-20 ENCOUNTER — OFFICE VISIT (OUTPATIENT)
Dept: BARIATRICS/WEIGHT MGMT | Age: 33
End: 2018-08-20

## 2018-08-20 VITALS
HEIGHT: 65 IN | BODY MASS INDEX: 35.82 KG/M2 | SYSTOLIC BLOOD PRESSURE: 124 MMHG | HEART RATE: 60 BPM | WEIGHT: 215 LBS | DIASTOLIC BLOOD PRESSURE: 60 MMHG

## 2018-08-20 DIAGNOSIS — G47.33 SLEEP APNEA, OBSTRUCTIVE: ICD-10-CM

## 2018-08-20 DIAGNOSIS — R73.03 PREDIABETES: ICD-10-CM

## 2018-08-20 DIAGNOSIS — Z98.84 S/P LAPAROSCOPIC SLEEVE GASTRECTOMY: ICD-10-CM

## 2018-08-20 DIAGNOSIS — K21.9 CHRONIC GERD: ICD-10-CM

## 2018-08-20 DIAGNOSIS — E78.2 HYPERLIPIDEMIA, MIXED: ICD-10-CM

## 2018-08-20 PROCEDURE — G8417 CALC BMI ABV UP PARAM F/U: HCPCS | Performed by: NURSE PRACTITIONER

## 2018-08-20 PROCEDURE — 99213 OFFICE O/P EST LOW 20 MIN: CPT | Performed by: NURSE PRACTITIONER

## 2018-08-20 PROCEDURE — 1036F TOBACCO NON-USER: CPT | Performed by: NURSE PRACTITIONER

## 2018-08-20 PROCEDURE — G8427 DOCREV CUR MEDS BY ELIG CLIN: HCPCS | Performed by: NURSE PRACTITIONER

## 2018-08-20 ASSESSMENT — ENCOUNTER SYMPTOMS
GASTROINTESTINAL NEGATIVE: 1
EYES NEGATIVE: 1
RESPIRATORY NEGATIVE: 1
ALLERGIC/IMMUNOLOGIC NEGATIVE: 1

## 2018-08-20 NOTE — PROGRESS NOTES
Dietary Assessment Note    Vitals:   Vitals:    08/20/18 1014   BP: 124/60   Pulse: 60   Weight: 215 lb (97.5 kg)   Height: 5' 5\" (1.651 m)    Patient lost 13 lbs over 3 months. Total Weight Loss: 73 lbs    Labs reviewed: labs reviewed, I note that lipids  LDL result does not yet meet goal    Protein intake: 60-80 grams/day     Fluid intake: 48-64 oz/day    Multivitamin/mineral intake: Fusion - how many/day: 4    Calcium intake: none    Other: biotin    Exercise: Yes. How much: most days of the week. What kind: packing boxes with moving    Nutrition Assessment: 9 month post-op visit. eats a balanced diet  Pt is able to eat 5-6 oz in one sitting. Pt is following the 30 30 30 rule.  Pt is drinking water, sometimes with flavor mixes sf.   B-cottage cheese and hb eggs  S-fruit cup OR pb and crax OR peanuts OR string cheese OR sf jello  L- leftovers - chicken, veggies OR air fried fish and veggies OR pork chops and salad  S-one of the snacks listed above   D-see lunch  S-sf popsicles OR sf pudding    Food Intolerances/issues: jad doss    Client Concerns: none    Goals: Continue with healthy eating and activity    Plan: F/u as directed      Sarita Aguero

## 2018-12-04 ENCOUNTER — OFFICE VISIT (OUTPATIENT)
Dept: BARIATRICS/WEIGHT MGMT | Age: 33
End: 2018-12-04
Payer: COMMERCIAL

## 2018-12-04 VITALS
WEIGHT: 211.8 LBS | HEIGHT: 65 IN | RESPIRATION RATE: 19 BRPM | OXYGEN SATURATION: 99 % | DIASTOLIC BLOOD PRESSURE: 71 MMHG | BODY MASS INDEX: 35.29 KG/M2 | HEART RATE: 68 BPM | SYSTOLIC BLOOD PRESSURE: 119 MMHG

## 2018-12-04 DIAGNOSIS — E66.9 OBESITY (BMI 30-39.9): ICD-10-CM

## 2018-12-04 DIAGNOSIS — R73.03 PREDIABETES: ICD-10-CM

## 2018-12-04 DIAGNOSIS — E66.01 MORBID OBESITY WITH BMI OF 45.0-49.9, ADULT (HCC): Primary | ICD-10-CM

## 2018-12-04 DIAGNOSIS — Z98.84 S/P LAPAROSCOPIC SLEEVE GASTRECTOMY: ICD-10-CM

## 2018-12-04 DIAGNOSIS — E78.2 HYPERLIPIDEMIA, MIXED: ICD-10-CM

## 2018-12-04 PROCEDURE — G8417 CALC BMI ABV UP PARAM F/U: HCPCS | Performed by: SURGERY

## 2018-12-04 PROCEDURE — 1036F TOBACCO NON-USER: CPT | Performed by: SURGERY

## 2018-12-04 PROCEDURE — G8484 FLU IMMUNIZE NO ADMIN: HCPCS | Performed by: SURGERY

## 2018-12-04 PROCEDURE — G8427 DOCREV CUR MEDS BY ELIG CLIN: HCPCS | Performed by: SURGERY

## 2018-12-04 PROCEDURE — 99213 OFFICE O/P EST LOW 20 MIN: CPT | Performed by: SURGERY

## 2018-12-04 NOTE — PROGRESS NOTES
in the right eye. Left eye exhibits no discharge. No foreign body present in the left eye. No scleral icterus. Neck: Trachea normal and normal range of motion. No JVD present. Pulmonary/Chest: Effort normal. No accessory muscle usage or stridor. No apnea. No respiratory distress. Cardiovascular: Normal rate and no JVD. Abdominal: Normal appearance. Patient exhibits no distension. Abdomen is soft, non tender. Musculoskeletal: Normal range of motion. Patient exhibits no edema. Neurological: Patient is alert and oriented to person, place, and time. Patient has normal strength. GCS eye subscore is 4. GCS verbal subscore is 5. GCS motor subscore is 6. Skin: Skin is warm and dry. No abrasion and no rash noted. Patient is not diaphoretic. No cyanosis or erythema. Psychiatric: Patient has a normal mood and affect. Speech is normal and behavior is normal. Cognition and memory are normal.     A/P:    Catalina Wilkes was seen today for bariatrics post op follow up. Diagnoses and all orders for this visit:    Morbid obesity with BMI of 45.0-49.9, adult ([facility] [address]  -     CBC Auto Differential; Future  -     Comprehensive Metabolic Panel; Future  -     Lipid Panel; Future  -     TSH with Reflex; Future  -     Iron and TIBC; Future  -     Vitamin B12 & Folate; Future  -     Vitamin D 25 Hydroxy; Future  -     Hemoglobin A1C; Future  -     Vitamin B1; Future  -     Vitamin B6; Future  -     Vitamin A; Future  -     Vitamin K1; Future  -     Vitamin E; Future    Obesity (BMI 30-39.9)  -     CBC Auto Differential; Future  -     Comprehensive Metabolic Panel; Future  -     Lipid Panel; Future  -     TSH with Reflex; Future  -     Iron and TIBC; Future  -     Vitamin B12 & Folate; Future  -     Vitamin D 25 Hydroxy;  Future  -     Hemoglobin A1C; Future  -     Vitamin B1; Future  -     Vitamin B6; Future  -     Vitamin A; Future  -     Vitamin K1; Future  -     Vitamin E; Future    S/P laparoscopic sleeve gastrectomy  -     CBC Auto Differential; Future  -     Comprehensive Metabolic Panel; Future  -     Lipid Panel; Future  -     TSH with Reflex; Future  -     Iron and TIBC; Future  -     Vitamin B12 & Folate; Future  -     Vitamin D 25 Hydroxy; Future  -     Hemoglobin A1C; Future  -     Vitamin B1; Future  -     Vitamin B6; Future  -     Vitamin A; Future  -     Vitamin K1; Future  -     Vitamin E; Future    Hyperlipidemia, mixed  -     CBC Auto Differential; Future  -     Comprehensive Metabolic Panel; Future  -     Lipid Panel; Future  -     TSH with Reflex; Future  -     Iron and TIBC; Future  -     Vitamin B12 & Folate; Future  -     Vitamin D 25 Hydroxy; Future  -     Hemoglobin A1C; Future  -     Vitamin B1; Future  -     Vitamin B6; Future  -     Vitamin A; Future  -     Vitamin K1; Future  -     Vitamin E; Future    Prediabetes  -     CBC Auto Differential; Future  -     Comprehensive Metabolic Panel; Future  -     Lipid Panel; Future  -     TSH with Reflex; Future  -     Iron and TIBC; Future  -     Vitamin B12 & Folate; Future  -     Vitamin D 25 Hydroxy; Future  -     Hemoglobin A1C; Future  -     Vitamin B1; Future  -     Vitamin B6; Future  -     Vitamin A; Future  -     Vitamin K1; Future  -     Vitamin E; Future          Adrian Herring is 34 y.o. female , now with Body mass index is 35.25 kg/m². s/p Sleeve gastrectomy, has lost 3 lbs since last visit, total of 76 lbs weight loss. The patient underwent dietary counseling with registered dietician. I have reviewed, discussed and agree with the dietary plan. Patient is trying hard to keep good dietary and behavior modifications. Patient is monitoring portion sizes, food choices and liquid calories. Patient is trying to exercise regularly. Patient pleased with the surgery outcomes.   We discussed how her weight affects her overall health including:  Patient Active Problem List   Diagnosis    Morbid obesity with BMI of 45.0-49.9, adult ([facility] [address]    Hypothyroidism    Prediabetes    Severe dysplasia of cervix (DAVE III)    PCOS (polycystic ovarian syndrome)    39 weeks gestation of pregnancy    Urinary incontinence    Sleep apnea, obstructive    Back pain    Carpal tunnel syndrome of right wrist    Hyperlipidemia, mixed    Morbid obesity with BMI of 40.0-44.9, adult (HCC)    S/P laparoscopic sleeve gastrectomy    Obesity (BMI 30-39. 9)    Obesity, Class II, BMI 35-39.9, with comorbidity    Chronic GERD    and importance of weight loss to alleviate those co morbid conditions. I encouraged the patient to continue exercise and keeping healthy eating habits. Also counseled the patient extensively on post surgery care. I spent a total of 15 minutes face to face with the patient and greater than 50% of the time was spent in counseling, answering questions, addressing concerns, reviewing labs and/or other studies with the patient as well as coordinating care. Michelle Paul making very good progress, discussed need to refrain from pregnancy until the 18-24 months period as discussed before to help her be more stable in weight as well nutritionally     RTC in 6 months  Diet and Exercise   Nutrition labs     Patient advised that its their responsibility to follow up for studies, referrals and/or labs ordered today.

## 2018-12-04 NOTE — PATIENT INSTRUCTIONS
Patient received dietary handouts and education.     Goals:   - Increase fluid intake to 48-64 oz/day  - Track 2-3 days/week 1200 calories, 60-80 grams protein  - Take 4 Fusion/day or MVI and calcium from alternative list  - Increase exercise to 15 minutes 2 days/week

## 2019-01-31 DIAGNOSIS — E66.01 MORBID OBESITY WITH BMI OF 45.0-49.9, ADULT (HCC): ICD-10-CM

## 2019-01-31 DIAGNOSIS — R73.03 PREDIABETES: ICD-10-CM

## 2019-01-31 DIAGNOSIS — Z98.84 S/P LAPAROSCOPIC SLEEVE GASTRECTOMY: ICD-10-CM

## 2019-01-31 DIAGNOSIS — E78.2 HYPERLIPIDEMIA, MIXED: ICD-10-CM

## 2019-01-31 DIAGNOSIS — E66.9 OBESITY (BMI 30-39.9): ICD-10-CM

## 2019-01-31 LAB
A/G RATIO: 2 (ref 1.1–2.2)
ALBUMIN SERPL-MCNC: 4.5 G/DL (ref 3.4–5)
ALP BLD-CCNC: 27 U/L (ref 40–129)
ALT SERPL-CCNC: 24 U/L (ref 10–40)
ANION GAP SERPL CALCULATED.3IONS-SCNC: 11 MMOL/L (ref 3–16)
AST SERPL-CCNC: 18 U/L (ref 15–37)
BASOPHILS ABSOLUTE: 0 K/UL (ref 0–0.2)
BASOPHILS RELATIVE PERCENT: 1 %
BILIRUB SERPL-MCNC: 0.5 MG/DL (ref 0–1)
BUN BLDV-MCNC: 11 MG/DL (ref 7–20)
CALCIUM SERPL-MCNC: 9 MG/DL (ref 8.3–10.6)
CHLORIDE BLD-SCNC: 105 MMOL/L (ref 99–110)
CHOLESTEROL, TOTAL: 155 MG/DL (ref 0–199)
CO2: 26 MMOL/L (ref 21–32)
CREAT SERPL-MCNC: 0.6 MG/DL (ref 0.6–1.1)
EOSINOPHILS ABSOLUTE: 0.1 K/UL (ref 0–0.6)
EOSINOPHILS RELATIVE PERCENT: 2.9 %
FOLATE: 11.5 NG/ML (ref 4.78–24.2)
GFR AFRICAN AMERICAN: >60
GFR NON-AFRICAN AMERICAN: >60
GLOBULIN: 2.3 G/DL
GLUCOSE BLD-MCNC: 93 MG/DL (ref 70–99)
HCT VFR BLD CALC: 36.3 % (ref 36–48)
HDLC SERPL-MCNC: 56 MG/DL (ref 40–60)
HEMOGLOBIN: 12.2 G/DL (ref 12–16)
IRON SATURATION: 29 % (ref 15–50)
IRON: 102 UG/DL (ref 37–145)
LDL CHOLESTEROL CALCULATED: 86 MG/DL
LYMPHOCYTES ABSOLUTE: 2.1 K/UL (ref 1–5.1)
LYMPHOCYTES RELATIVE PERCENT: 46 %
MCH RBC QN AUTO: 29.7 PG (ref 26–34)
MCHC RBC AUTO-ENTMCNC: 33.7 G/DL (ref 31–36)
MCV RBC AUTO: 88.4 FL (ref 80–100)
MONOCYTES ABSOLUTE: 0.3 K/UL (ref 0–1.3)
MONOCYTES RELATIVE PERCENT: 5.5 %
NEUTROPHILS ABSOLUTE: 2.1 K/UL (ref 1.7–7.7)
NEUTROPHILS RELATIVE PERCENT: 44.6 %
PDW BLD-RTO: 13 % (ref 12.4–15.4)
PLATELET # BLD: 227 K/UL (ref 135–450)
PMV BLD AUTO: 8.5 FL (ref 5–10.5)
POTASSIUM SERPL-SCNC: 4.3 MMOL/L (ref 3.5–5.1)
RBC # BLD: 4.11 M/UL (ref 4–5.2)
SODIUM BLD-SCNC: 142 MMOL/L (ref 136–145)
TOTAL IRON BINDING CAPACITY: 347 UG/DL (ref 260–445)
TOTAL PROTEIN: 6.8 G/DL (ref 6.4–8.2)
TRIGL SERPL-MCNC: 63 MG/DL (ref 0–150)
TSH REFLEX: 3.13 UIU/ML (ref 0.27–4.2)
VITAMIN B-12: 520 PG/ML (ref 211–911)
VITAMIN D 25-HYDROXY: 26.2 NG/ML
VLDLC SERPL CALC-MCNC: 13 MG/DL
WBC # BLD: 4.6 K/UL (ref 4–11)

## 2019-02-01 LAB
ESTIMATED AVERAGE GLUCOSE: 108.3 MG/DL
HBA1C MFR BLD: 5.4 %

## 2019-02-03 LAB
ALPHA-TOCOPHEROL: 7.6 MG/L (ref 5.5–18)
GAMMA-TOCOPHEROL: 0.6 MG/L (ref 0–6)
RETINYL PALMITATE: <0.02 MG/L (ref 0–0.1)
VITAMIN A LEVEL: 0.44 MG/L (ref 0.3–1.2)
VITAMIN A, INTERP: NORMAL

## 2019-02-04 LAB
VITAMIN B1, PLASMA: 6 NMOL/L (ref 4–15)
VITAMIN B6: 342.3 NMOL/L (ref 20–125)

## 2019-02-05 LAB — VITAMIN K1: 0.43 NMOL/L (ref 0.22–4.88)

## 2019-04-12 LAB — GENITAL CULTURE, ROUTINE: NORMAL

## 2019-06-03 PROBLEM — E66.01 SEVERE OBESITY (BMI 35.0-39.9) WITH COMORBIDITY (HCC): Status: ACTIVE | Noted: 2019-06-03

## 2019-06-06 ENCOUNTER — OFFICE VISIT (OUTPATIENT)
Dept: ORTHOPEDIC SURGERY | Age: 34
End: 2019-06-06
Payer: COMMERCIAL

## 2019-06-06 VITALS
WEIGHT: 216 LBS | SYSTOLIC BLOOD PRESSURE: 120 MMHG | DIASTOLIC BLOOD PRESSURE: 59 MMHG | BODY MASS INDEX: 35.99 KG/M2 | HEART RATE: 57 BPM | HEIGHT: 65 IN

## 2019-06-06 DIAGNOSIS — M75.82 ROTATOR CUFF TENDINITIS, LEFT: ICD-10-CM

## 2019-06-06 DIAGNOSIS — M25.512 LEFT SHOULDER PAIN, UNSPECIFIED CHRONICITY: Primary | ICD-10-CM

## 2019-06-06 DIAGNOSIS — M19.012 ARTHRITIS OF LEFT ACROMIOCLAVICULAR JOINT: ICD-10-CM

## 2019-06-06 PROCEDURE — 20610 DRAIN/INJ JOINT/BURSA W/O US: CPT | Performed by: ORTHOPAEDIC SURGERY

## 2019-06-06 PROCEDURE — G8427 DOCREV CUR MEDS BY ELIG CLIN: HCPCS | Performed by: ORTHOPAEDIC SURGERY

## 2019-06-06 PROCEDURE — 99213 OFFICE O/P EST LOW 20 MIN: CPT | Performed by: ORTHOPAEDIC SURGERY

## 2019-06-06 PROCEDURE — 1036F TOBACCO NON-USER: CPT | Performed by: ORTHOPAEDIC SURGERY

## 2019-06-06 PROCEDURE — G8417 CALC BMI ABV UP PARAM F/U: HCPCS | Performed by: ORTHOPAEDIC SURGERY

## 2019-06-06 NOTE — PROGRESS NOTES
Depo medrol NDC [id number]  Lot# [id number]  Exp.2/2021  Shoulder,left    Ropivacaine    NCD#:  [id number]  LOT#:  [id number]  Exp Date:  11/2022  Shoulder, left

## 2019-06-06 NOTE — PROGRESS NOTES
This patient was seen approximately a year and a half ago for her left shoulder. She has a young child and was having left shoulder pain. We felt she had a.c. arthrosis and impingement. We injected both of these and she got almost complete relief until recently. She says now when she lifts her child when she sleeps on her left side she has pain. She points mostly to the top of the shoulder. She's had no new injury or traumatic incident. On examination left shoulder today she can get 180° flexion and abduction but this hurts past about 100° in both. At 90° abduction she has about 95° external rotation and 30° internal rotation which matches her opposite side. She is tender to a.c. joint has more pain with crossarm test.  She has anterior cuff tenderness positive impingement signs on the side. There is no supraspinatus or supinate his atrophy. She has a trace sulcus but a negative apprehension and relocation sign. She has quite good external rotation strength. She has negative belly press test.  She has normal elbow wrist and hand range of motion. She has normal sensibility in the left upper extremity C5 T1. She has negative Spurling sign. 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 Joe: After sterile prep injected left shoulder with 80 mg of Depo-Medrol with 15 mg of ropivacaine split between a.c. joint subacromial space. Patient tolerated this procedure well.

## 2019-06-10 ENCOUNTER — OFFICE VISIT (OUTPATIENT)
Dept: ORTHOPEDIC SURGERY | Age: 34
End: 2019-06-10
Payer: COMMERCIAL

## 2019-06-10 VITALS
BODY MASS INDEX: 35.99 KG/M2 | HEART RATE: 62 BPM | DIASTOLIC BLOOD PRESSURE: 60 MMHG | HEIGHT: 65 IN | SYSTOLIC BLOOD PRESSURE: 120 MMHG | WEIGHT: 216 LBS

## 2019-06-10 DIAGNOSIS — M22.2X1 PATELLOFEMORAL PAIN SYNDROME OF RIGHT KNEE: ICD-10-CM

## 2019-06-10 DIAGNOSIS — M75.40 IMPINGEMENT SYNDROME OF SHOULDER REGION, UNSPECIFIED LATERALITY: ICD-10-CM

## 2019-06-10 DIAGNOSIS — M25.561 RIGHT KNEE PAIN, UNSPECIFIED CHRONICITY: Primary | ICD-10-CM

## 2019-06-10 DIAGNOSIS — M67.51 PLICA OF KNEE, RIGHT: ICD-10-CM

## 2019-06-10 PROCEDURE — G8417 CALC BMI ABV UP PARAM F/U: HCPCS | Performed by: ORTHOPAEDIC SURGERY

## 2019-06-10 PROCEDURE — 99214 OFFICE O/P EST MOD 30 MIN: CPT | Performed by: ORTHOPAEDIC SURGERY

## 2019-06-10 PROCEDURE — 1036F TOBACCO NON-USER: CPT | Performed by: ORTHOPAEDIC SURGERY

## 2019-06-10 PROCEDURE — G8427 DOCREV CUR MEDS BY ELIG CLIN: HCPCS | Performed by: ORTHOPAEDIC SURGERY

## 2019-06-10 NOTE — PROGRESS NOTES
Date:  6/10/2019    Name:  Maralee Bence  Address:  [place] [id number]    :  1985      Age:   30 y.o.    SSN:  [id number]      Medical Record Number:  [id number]    Reason for Visit:    Chief Complaint    Knee Pain (Rt Knee pain when kneeling no specific injury.)      DOS:6/10/2019     HPI: Maralee Bence is a 30 y.o. female here today for evaluation of her right knee. She reports that she has a new toddler at home and does a lot of kneeling and squatting. She felt an increase in her right knee pain over the past few months to a year without preceding injury. She denies any mechanical symptoms. He localizes most of her knee to the anterior and medial side of her knee. She has had IT band issues in the past. No prior injuries to her knees. She denies any numbness or tingling. Pain Assessment  Location of Pain: Knee  Location Modifiers: Right  Severity of Pain: 8  Quality of Pain: (tearing feeling)  Duration of Pain: Persistent  Frequency of Pain: Intermittent  Aggravating Factors: Bending  Limiting Behavior: Yes  Result of Injury: No  Work-Related Injury: No  Are there other pain locations you wish to document?: No  ROS: Review of systems reviewed from Patient History Form completed today and available in the patient's chart under the Media tab. Past Medical History:   Diagnosis Date    Abnormal Pap smear of cervix 2014    prior LEEP.  Arthritis     upper cervical area pain    Carpal tunnel syndrome of right wrist 5/10/2017    Gestational diabetes     pre-diabetic--not medically treated    Hypothyroidism     Iron deficiency anemia     Obesity, unspecified     Pancreatitis     PCOS (polycystic ovarian syndrome)     Sleep apnea     requires CPAP while sleeping.         Past Surgical History:   Procedure Laterality Date    CARPAL TUNNEL RELEASE Right 2017    Right carpal tunnel release       SECTION  2016    CHOLECYSTECTOMY, LAPAROSCOPIC 07/16/2010    Angel ZIMMERMAN  8/14    [place]    SINUS SURGERY      SLEEVE GASTRECTOMY  11/27/2017    laparoscopic - Dr. Dianna Brantley Bilateral     WISDOM TOOTH EXTRACTION         Family History   Problem Relation Age of Onset    High Blood Pressure Mother     Diabetes Maternal Grandfather     High Blood Pressure Maternal Grandfather     Diabetes Maternal Grandmother     Cancer Maternal Grandmother     Alcohol Abuse Father         alcohol     Cancer Paternal Grandmother        Social History     Socioeconomic History    Marital status:      Spouse name: None    Number of children: None    Years of education: None    Highest education level: None   Occupational History    None   Social Needs    Financial resource strain: None    Food insecurity:     Worry: None     Inability: None    Transportation needs:     Medical: None     Non-medical: None   Tobacco Use    Smoking status: Never Smoker    Smokeless tobacco: Never Used   Substance and Sexual Activity    Alcohol use:  Yes     Alcohol/week: 0.0 oz     Comment: 1 drink every 6 months    Drug use: No    Sexual activity: Yes     Partners: Male     Birth control/protection: IUD   Lifestyle    Physical activity:     Days per week: None     Minutes per session: None    Stress: None   Relationships    Social connections:     Talks on phone: None     Gets together: None     Attends Sikh service: None     Active member of club or organization: None     Attends meetings of clubs or organizations: None     Relationship status: None    Intimate partner violence:     Fear of current or ex partner: None     Emotionally abused: None     Physically abused: None     Forced sexual activity: None   Other Topics Concern    None   Social History Narrative    None       Current Outpatient Medications   Medication Sig Dispense Refill    omeprazole (PRILOSEC) 20 MG delayed release capsule Take 1 capsule by mouth Daily 30 capsule 3    Multiple Vitamins-Minerals (BARIATRIC FUSION) CHEW Take 4 tablets by mouth daily       diphenoxylate-atropine (DIPHENATOL) 2.5-0.025 MG per tablet Take 1 tablet by mouth 3 times daily as needed for Diarrhea . 30 tablet 0    acetaminophen (TYLENOL) 325 MG tablet Take 650 mg by mouth as needed for Pain      cyclobenzaprine (FLEXERIL) 5 MG tablet Take 1 tablet by mouth 3 times daily as needed for Muscle spasms 30 tablet 0     No current facility-administered medications for this visit. No Known Allergies    Vital signs:  /60   Pulse 62   Ht 5' 5\" (1.651 m)   Wt 216 lb (98 kg)   BMI 35.94 kg/m²          Neuro: Alert & oriented x 3,  normal,  no focal deficits noted. Normal affect. Eyes: sclera clear  Ears: Normal external ear  Mouth:  No perioral lesions  Pulm: Respirations unlabored and regular  Pulse: Extremities well perfused. 2+ peripheral pulses. Skin: Warm. No ulcerations. Constitutional: The physical examination finds the patient to be well-developed and well-nourished. The patient is alert and oriented x3 and was cooperative throughout the visit. Right knee exam    Gait: No use of assistive devices. No antalgic gait. Alignment: normal alignment. Inspection/skin: Skin is intact without erythema or ecchymosis. No gross deformity. Palpation: Mild patellofemoral crepitus. Mild medial joint line tenderness present. She does also have some tenderness in the region of the medial patellar plica. Tenderness over the distal IT band. She has some IT band tightness    Range of Motion: There is full range of motion. Strength: Normal quadriceps development. Effusion: No effusion or swelling present. Ligamentous stability: No cruciate or collateral ligament instability. Neurologic and vascular: Skin is warm and well-perfused. Sensation is intact to light-touch. Special tests: Negative Aleksandra sign. Left knee exam    Gait: No use of assistive devices.  No see her back after the MRI or in 4 to 6 weeks for recheck. Carlos Abarca is in agreement with this plan. All questions were answered to patient's satisfaction and was encouraged to call with any further questions. Orders Placed This Encounter   Procedures    XR KNEE RIGHT (3 VIEWS)     Standing Status:   Future     Number of Occurrences:   1     Standing Expiration Date:   6/10/2020     Order Specific Question:   Reason for exam:     Answer:   Tamika Gifford D.O. Clinical Fellow, [address] [place] [address]  Date:    6/10/2019      The encounter with Carlos Abarca was supervised by Dr. Jacey Leigh, who personally examined the patient and reviewed the plan. This dictation was performed with a verbal recognition program (DRAGON) and it was checked for errors. It is possible that there are still dictated errors within this office note. If so, please bring any errors to my attention for an addendum. All efforts were made to ensure that this office note is accurate. Attestation:  I was physically present and performed my own examination of this patient and have discussed the case, including pertinent history and exam findings with the fellow. I agree with the documented assessment and plan. Fanny Dent.  Jacey Leigh MD

## 2019-07-12 NOTE — PROGRESS NOTES
[facility]) Physicians   [facility]    Subjective:      Patient ID: Urmila Valle is a 30 y.o. female    HPI    1 year 7 months s/p sleeve gastrectomy    Urmila Valle is a very pleasant 30 y.o. obese female , Body mass index is 36.74 kg/m². Patient denies any nausea, vomiting, fevers, chills, shortness of breath, chest pain, constipation or urinary symptoms. Denies any heartburn nor dysphagia. Past Medical History:   Diagnosis Date    Abnormal Pap smear of cervix     prior LEEP.  Arthritis     upper cervical area pain    Carpal tunnel syndrome of right wrist 5/10/2017    Gestational diabetes     pre-diabetic--not medically treated    Hypothyroidism     Iron deficiency anemia     Obesity, unspecified     Pancreatitis     PCOS (polycystic ovarian syndrome)     Sleep apnea     requires CPAP while sleeping. Past Surgical History:   Procedure Laterality Date    CARPAL TUNNEL RELEASE Right 2017    Right carpal tunnel release       SECTION  2016    CHOLECYSTECTOMY, LAPAROSCOPIC  2010    Joshua Dietrich LEEP      [place]    SINUS SURGERY      SLEEVE GASTRECTOMY  2017    laparoscopic - Dr. Harleen Brewer Bilateral     WISDOM TOOTH EXTRACTION       Family History   Problem Relation Age of Onset    High Blood Pressure Mother     Diabetes Maternal Grandfather     High Blood Pressure Maternal Grandfather     Diabetes Maternal Grandmother     Cancer Maternal Grandmother     Alcohol Abuse Father         alcohol     Cancer Paternal Grandmother      Social History     Tobacco Use    Smoking status: Never Smoker    Smokeless tobacco: Never Used   Substance Use Topics    Alcohol use: Yes     Alcohol/week: 0.0 standard drinks     Comment: 1 drink every 6 months     I counseled the patient on the importance of not smoking and risks of ETOH.    No Known Allergies  Vitals:    07/15/19 1303   BP: 115/74   Pulse: 67   Weight: 220 lb 12.8 she is too busy. Encouraged intentional physical activity. She is taking vitamins, but not consistently. Discussed the importance of taking the multivitamins on a regular basis. She did meet with the registered dietitian for continued follow up. I agree with recommendations and plan. We will see her back in 3 months for continued follow up. A total of 15 minutes was spent face-to-face with the patient and over half of that time was spent counseling the patient on proper dietary behaviors, exercise and post-op progress.

## 2019-07-15 ENCOUNTER — OFFICE VISIT (OUTPATIENT)
Dept: BARIATRICS/WEIGHT MGMT | Age: 34
End: 2019-07-15
Payer: COMMERCIAL

## 2019-07-15 ENCOUNTER — HOSPITAL ENCOUNTER (OUTPATIENT)
Dept: PHYSICAL THERAPY | Age: 34
Setting detail: THERAPIES SERIES
Discharge: HOME | End: 2019-07-15
Payer: COMMERCIAL

## 2019-07-15 VITALS
BODY MASS INDEX: 36.79 KG/M2 | WEIGHT: 220.8 LBS | HEART RATE: 67 BPM | HEIGHT: 65 IN | DIASTOLIC BLOOD PRESSURE: 74 MMHG | SYSTOLIC BLOOD PRESSURE: 115 MMHG

## 2019-07-15 DIAGNOSIS — Z98.84 S/P LAPAROSCOPIC SLEEVE GASTRECTOMY: ICD-10-CM

## 2019-07-15 DIAGNOSIS — R73.03 PREDIABETES: Primary | ICD-10-CM

## 2019-07-15 DIAGNOSIS — E66.01 SEVERE OBESITY (BMI 35.0-39.9) WITH COMORBIDITY (HCC): ICD-10-CM

## 2019-07-15 DIAGNOSIS — E78.2 HYPERLIPIDEMIA, MIXED: ICD-10-CM

## 2019-07-15 DIAGNOSIS — K21.9 CHRONIC GERD: ICD-10-CM

## 2019-07-15 PROCEDURE — G8417 CALC BMI ABV UP PARAM F/U: HCPCS | Performed by: NURSE PRACTITIONER

## 2019-07-15 PROCEDURE — 97110 THERAPEUTIC EXERCISES: CPT

## 2019-07-15 PROCEDURE — 97161 PT EVAL LOW COMPLEX 20 MIN: CPT

## 2019-07-15 PROCEDURE — G8427 DOCREV CUR MEDS BY ELIG CLIN: HCPCS | Performed by: NURSE PRACTITIONER

## 2019-07-15 PROCEDURE — 99213 OFFICE O/P EST LOW 20 MIN: CPT | Performed by: NURSE PRACTITIONER

## 2019-07-15 PROCEDURE — 1036F TOBACCO NON-USER: CPT | Performed by: NURSE PRACTITIONER

## 2019-07-15 NOTE — PATIENT INSTRUCTIONS
Diet tips to help make you successful postoperatively    Eating habits after surgery will need to be a long-term change. Eating habits are so ingrained that it can be difficult to change so having made these changes for surgery is a great accomplishment. It is important to maintain these new eating habits after surgery. Also, remember that overall health, age, and genetics make each person's weight loss progress different. Do not compare your progress, the amount you eat, or exercise to other patients.  Protein first at every meal- Eat the protein portion of your meal first. Eating protein helps the body feel full and sends a signal to stop eating. Protein is very important in building tissue in the body.  Eat at least 4 times per day- This includes protein supplements and small meals with a high amount of protein   Chewing your food thoroughly- Eating too quickly and improper chewing can cause pain and vomiting after surgery.  Slowing down the speed at which you eat- Refill your fork only after you swallow. Adopt a new pattern of eating by taking a bite of food and putting your utensil down between bites. This will help to reduce the feeling of food being stuck.    Drink water and other fluids slowly- Drink at least 48 ounces per day minimum. Sip fluids as if they were hot beverages. If you find it difficult to stop gulping liquids, try using a sippy cup or a sport top water bottle.  Make sure you are eating meals without drinking fluids- After surgery you will not be allowed to drink fluids 30 minutes before, during, or 30 minutes after your meal (30/30/30 rule). This will be a life-long behavior change. The reason for the rule is to keep food from passing through your smaller stomach more rapidly.  This will cause you to feel hungry again shortly after your meal.   Continue to avoid caffeine and carbonated beverages- Caffeine acts as a diuretic and can be dehydrating as well as irritating to the lining of the stomach. Carbonated beverages release gas and can expand the stomach.  Continue to keep temptation from your kitchen- Keep your pantry and kitchen cabinets cleaned out of those dangerous foods that might tempt you after surgery (chips, cookies, candy, etc.).  Continue to increase your exercise program- Increase your daily physical activity. Aim for 5-6 days per week for 30 minutes. Walking is an easy way to get started with exercising. You want exercise to be a regular part of your life after surgery.  Make sure you have a good support system- There will be many changes and adjustments to make after surgery. It is important to have a supportive friend, family member or co-worker, etc. with whom you can talk. Continue to attend [facility]) Weight Management support groups as they can be helpful in maintaining behaviors. In addition, it is the responsibility of the patient to schedule and follow up on labs and tests completed after surgery. Results will be reviewed at each visit. Patient received dietary handouts and education.     Goals:   - Increase fluid intake to 48-64 oz/day  - Take 2 MVI and 2 citracal petite consistently  - Avoid / limit caffeine  *May be a good candidate to see behaviorist

## 2019-07-15 NOTE — FLOWSHEET NOTE
for HEP  -all pt questions were answered    Therapeutic Exercise and NMR EXR  [x] (18138) Provided verbal/tactile cueing for activities related to strengthening, flexibility, endurance, ROM  for improvements in scapular, scapulothoracic and UE control with self care, reaching, carrying, lifting, house/yardwork, driving/computer work.    [] (56734) Provided verbal/tactile cueing for activities related to improving balance, coordination, kinesthetic sense, posture, motor skill, proprioception  to assist with  scapular, scapulothoracic and UE control with self care, reaching, carrying, lifting, house/yardwork, driving/computer work. Therapeutic Activities:    [] (09742 or 75418) Provided verbal/tactile cueing for activities related to improving balance, coordination, kinesthetic sense, posture, motor skill, proprioception and motor activation to allow for proper function of scapular, scapulothoracic and UE control with self care, carrying, lifting, driving/computer work.      Home Exercise Program:    [x] (48480) Reviewed/Progressed HEP activities related to strengthening, flexibility, endurance, ROM of scapular, scapulothoracic and UE control with self care, reaching, carrying, lifting, house/yardwork, driving/computer work  [] (96011) Reviewed/Progressed HEP activities related to improving balance, coordination, kinesthetic sense, posture, motor skill, proprioception of scapular, scapulothoracic and UE control with self care, reaching, carrying, lifting, house/yardwork, driving/computer work      Manual Treatments:  PROM / STM / Oscillations-Mobs:  G-I, II, III, IV (PA's, Inf., Post.)  [] (31268) Provided manual therapy to mobilize soft tissue/joints of cervical/CT, scapular GHJ and UE for the purpose of modulating pain, promoting relaxation,  increasing ROM, reducing/eliminating soft tissue swelling/inflammation/restriction, improving soft tissue extensibility and allowing for proper ROM for normal function with self care, reaching, carrying, lifting, house/yardwork, driving/computer work    Modalities:  Deferred    Charges:  Timed Code Treatment Minutes: 25'   Total Treatment Minutes: 61'       [x] EVAL - LOW (12596)   [] EVAL - MOD (48467)  [] EVAL - HIGH (93914)  [] RE-EVAL (30695)  [x] FG(12664) x   2   [] Ionto  [] NMR (84119) x      [] Vaso  [] Manual (37262) x      [] Ultrasound:  [] TA x       [] Mech Traction (24797)  [] Home Management Training x    [] ES (un) (64260):   [] Aquatic    [] ES(attended) (09349)  [] Other:                     GOALS:  Patient stated goal: To be able to play with son without pain    Therapist goals for Patient:   Short Term Goals: To be achieved in: 2 weeks  1. Independent in HEP and progression per patient tolerance, in order to prevent re-injury. 2. Patient will have a decrease in pain to facilitate improvement in movement, function, and ADLs as indicated by Functional Deficits. Long Term Goals: To be achieved in: 6 weeks  1. Disability index score of 8% or less for the UEFI or Quick DASH to assist with reaching prior level of function. 2. Patient will demonstrate an increase in Strength in flexion, abduction, ER, and IR to 4+/5 or higher to allow for proper functional mobility as indicated by patients Functional Deficits. 3. Patient will return to functional activities including driving, lifting objects without increased symptoms or restriction. 4. Patient will demonstrate ability to play (carrying, lifting, putting him to bed) with son with no increase in pain     Progression Towards Functional goals:  [] Patient is progressing as expected towards functional goals listed. [] Progression is slowed due to complexities listed. [] Progression has been slowed due to co-morbidities.   [x] Plan just implemented, too soon to assess goals progression  [] Other:     Persisting Functional Limitations/Impairments:  []Sitting []Standing   []Transfers

## 2019-07-15 NOTE — PROGRESS NOTES
Dietary Assessment Note    Vitals:   Vitals:    07/15/19 1303   BP: 115/74   Pulse: 67   Weight: 220 lb 12.8 oz (100.2 kg)   Height: 5' 5\" (1.651 m)   Patient gained 9 lbs over past ~7 months. Pt states she is tired all the time, drinking 5hr power. States she is more stressed and has some depression, lost grandma in Nov. 2018 and isn't sure if she has adjusted. She isn't sleeping well. Pt states diet hasn't changed, does not feel like she is eating anything extra. Does not have time to track. Total Weight Loss: 63.2 lb    Labs reviewed: Reviewed 1/31/19 - Vit D 26.2    Protein intake: 60-80g (at least 1 premier protein shake) / was tracking at ~1200 fara     Fluid intake: ~40oz - propel / sf flavoring pkts     Multivitamin/mineral intake: centrum 2 - doesn't take consistently     Calcium intake: 2 citracal petite - doesn't take consistently     Other: none     Exercise: trying to stay busy but no formal exercise / was going to the gym but it's been about 1 month since she has been     Nutrition Assessment: 1 year 7 month post-op visit. Breakfast: CC & HB egg     Snack: protein shake (premier)      Lunch: Leftovers (grilled chix + green beans) OR salad kit- romaine fe chicken OR wills     Snack: P3 OR 1.5 cheese stick     Dinner: grilled chicken w/ green beans & some carrots     Snack:  Sf popsicle      Amount able to eat per sitting: ~ 6 oz     Following 30/30/30 rule: Eating in 20-25 minutes.  Avoids eating and drinking at the same time.     Food Intolerances/issues: tomato sauce, fish     Client Concerns: a lot of heartburn     Goals:   - Increase fluid intake to 48-64 oz/day  - Take 2 MVI and 2 citracal petite consistently  - Avoid / limit caffeine  *May be a good candidate to see behaviorist     Plan: Follow up as directed     Jhonatan Turcios

## 2019-07-15 NOTE — PLAN OF CARE
[place], [address] [facility], [address]  Phone: [phone]   Fax: [phone]                                                     Idania Pompa    Dear Dr. Jerry Cook,    We had the pleasure of evaluating the following patient for physical therapy services at [address] [place] [address]. A summary of our findings can be found in the initial assessment below. This includes our plan of care. If you have any questions or concerns regarding these findings, please do not hesitate to contact me at the office phone number checked above. Thank you for the referral.       Physician Signature:_______________________________Date:__________________  By signing above (or electronic signature), therapists plan is approved by physician      Patient: Corina Monahan   : 1985   MRN: [id number]  Referring Physician: Referring Practitioner: Jerry Cook      Evaluation Date: 7/15/2019      Medical Diagnosis Information:  Diagnosis: M25.561 (ICD-10-CM) - Right knee pain, unspecified chronicity, M22.2X1 (ICD-10-CM) - Patellofemoral pain syndrome of right knee, M75.40 (ICD-10-CM) - Impingement syndrome of shoulder region, unspecified laterality   Treatment Diagnosis: Pt with decrease strength, and increased pain with signs/symptoms consistent with impingement of L shoulder. Insurance information: PT Insurance Information: [facility]    Precautions/ Contra-indications: None  Latex Allergy:  [x]NO      []YES  Preferred Language for Healthcare:   [x]English       []other:    SUBJECTIVE: Pt states that her shoulder started hurting with insidious onset about 2 years ago. She got a cortisone shot in 2017 that relieved her pain up until this year. She got another shot a month ago that was painful and did not provide any relief. ..  With her arm up in high abduction and IR (impingement position) her

## 2019-07-29 ENCOUNTER — HOSPITAL ENCOUNTER (OUTPATIENT)
Dept: PHYSICAL THERAPY | Age: 34
Setting detail: THERAPIES SERIES
Discharge: HOME | End: 2019-07-29
Payer: COMMERCIAL

## 2019-07-29 PROCEDURE — 97140 MANUAL THERAPY 1/> REGIONS: CPT

## 2019-07-29 PROCEDURE — 97110 THERAPEUTIC EXERCISES: CPT

## 2019-07-29 PROCEDURE — 97161 PT EVAL LOW COMPLEX 20 MIN: CPT

## 2019-07-29 NOTE — PLAN OF CARE
Pt states that she has not had any MICHAEL. Pt states that her IT band has been tight for about 11 years. Pt stated that she sprained her ankle multiple times on both sides. Last time was about 5-6 years ago on the R side. Most recent L ankle sprain was 2 years ago. Pt Denies any episodes of severe swelling. Pt states that squatting and kneeling exacerbates her symptoms. Sitting on her leg also. When sitting for any period of time whenever she goes to rise it hurts. R side hip has been hurting \"deep in the joint\". Denies popping/clicking in hip. Denies locking up in knee. Stairs hurt her knee when she is not warmed up. Pt states that she does not ice her knee regularly or take pain/anti-inflammatory medication. Relevant Medical History: OA, Hypothyroidism  Functional Outcome: LEFS 39% LOF    Pain Scale: 0/10 at rest, 6-7/10 worst  Easing factors: Rest  Provocative factors: Squatting, Kneeling    Type: []Constant   [x]Intermittent  []Radiating [x]Localized medial joint line/along patella  []other:     Numbness/Tingling: Denies    Occupation/School: [employer]     Living Status/Prior Level of Function:Prior to this injury / incident, pt was independent with ADLs and IADLs, squatting, lifting. OBJECTIVE:   Posture: Fair    Functional Mobility/Transfers:  Independent    Inspection:  Nothing Significant    Palpation: TTP medial/anterior joint line of R knee    Bandages/Dressings/Incisions: None    Gait: (include devices/WB status) no AD. No significant deviations.  Slight Genu Valgus    Dermatomes Normal Abnormal Comments   inguinal area (L1)  y     anterior mid-thigh (L2) y     distal ant thigh/med knee (L3) y     medial lower leg and foot (L4) y     lateral lower leg and foot (L5) y     posterior calf (S1) y     medial calcaneus (S2) y         Myotomes Normal Abnormal Comments   Hip flexion (L1-L2) y     Knee extension (L2-L4) y     Dorsiflexion (L4-L5) y     Luh Wander Toe Ext (L5) y     Ankle Eversion pectoris (I20)  []Atherosclerosis (I70)   Musculoskeletal conditions   []Disc pathology   []Congenital spine pathologies   []Prior surgical intervention  []Osteoporosis (M81.8)  []Osteopenia (M85.8)   Endocrine conditions   [x]Hypothyroid (E03.9)  []Hyperthyroid Gastrointestinal conditions   []Constipation (E95.32)   Metabolic conditions   []Morbid obesity (E66.01)  []Diabetes type 1(E10.65) or 2 (E11.65)   []Neuropathy (G60.9)     Pulmonary conditions   []Asthma (J45)  []Coughing   []COPD (J44.9)   Psychological Disorders  []Anxiety (F41.9)  []Depression (F32.9)   []Other:   []Other:          Barriers to/and or personal factors that will affect rehab potential:              []Age  []Sex    []Smoker              []Motivation/Lack of Motivation                        [x]Co-Morbidities              []Cognitive Function, education/learning barriers              []Environmental, home barriers              [x]profession/work barriers  []past PT/medical experience  []other:  Justification: Lifting part of work. Falls Risk Assessment (30 days):   [x] Falls Risk assessed and no intervention required. [] Falls Risk assessed and Patient requires intervention due to being higher risk   TUG score (>12s at risk):     [] Falls education provided, including        ASSESSMENT: Pt presents to PT with R knee pain along with decreased quad tone and functional strength. Pt has hypermobile patellae bilaterally. Pt has trouble with prolonged sitting, standing following doing so, and deep knee flexion. All ligaments and structures seem intact based on special tests and objective findings. Pt will benefit from formal PT to address functional strength, pain, and patellar tracking issues in her R knee.     Functional Impairments:     []Noted lumbar/proximal hip/LE joint hypomobility   []Decreased LE functional ROM   [x]Decreased core/proximal hip strength and neuromuscular control   [x]Decreased LE functional strength   []Reduced balance/proprioceptive control   []other:      Functional Activity Limitations (from functional questionnaire and intake)   []Reduced ability to tolerate prolonged functional positions   [x]Reduced ability or difficulty with changes of positions or transfers between positions   [x]Reduced ability to maintain good posture and demonstrate good body mechanics with sitting, bending, and lifting   []Reduced ability to sleep   [] Reduced ability or tolerance with driving and/or computer work   [x]Reduced ability to perform lifting, carrying tasks   [x]Reduced ability to squat   []Reduced ability to forward bend   [x]Reduced ability to ambulate prolonged functional periods/distances/surfaces   [x]Reduced ability to ascend/descend stairs   [x]Reduced ability to run, hop, cut or jump   []other:    Participation Restrictions   []Reduced participation in self care activities   []Reduced participation in home management activities   [x]Reduced participation in work activities   []Reduced participation in social activities. [x]Reduced participation in sport/recreation activities. Classification :    []Signs/symptoms consistent with post-surgical status including decreased ROM, strength and function.    []Signs/symptoms consistent with joint sprain/strain   [x]Signs/symptoms consistent with patella-femoral syndrome   []Signs/symptoms consistent with knee OA/hip OA   []Signs/symptoms consistent with internal derangement of knee/Hip   []Signs/symptoms consistent with functional hip weakness/NMR control      []Signs/symptoms consistent with tendinitis/tendinosis    []signs/symptoms consistent with pathology which may benefit from Dry needling      []other:      Prognosis/Rehab Potential:      []Excellent   [x]Good    []Fair   []Poor    Tolerance of evaluation/treatment:    []Excellent   [x]Good    []Fair   []Poor    Physical Therapy Evaluation Complexity Justification  [x] A history of present problem with:  [] no personal factors and/or comorbidities that impact the plan of care;  [x]1-2 personal factors and/or comorbidities that impact the plan of care  []3 personal factors and/or comorbidities that impact the plan of care  [x] An examination of body systems using standardized tests and measures addressing any of the following: body structures and functions (impairments), activity limitations, and/or participation restrictions;:  [] a total of 1-2 or more elements   [] a total of 3 or more elements   [x] a total of 4 or more elements   [x] A clinical presentation with:  [x] stable and/or uncomplicated characteristics   [] evolving clinical presentation with changing characteristics  [] unstable and unpredictable characteristics;   [x] Clinical decision making of [x] low , [] moderate, [] high complexity using standardized patient assessment instrument and/or measurable assessment of functional outcome. [x] EVAL (LOW) 99850 (typically 15 minutes face-to-face)  [] EVAL (MOD) 23796 (typically 30 minutes face-to-face)  [] EVAL (HIGH) 98298 (typically 45 minutes face-to-face)  [] RE-EVAL     PLAN:   Frequency/Duration:  2 days per week for 4-6 Weeks:  Interventions:  [x]  Therapeutic exercise including: strength training, ROM, for Lower extremity and core   [x]  NMR activation and proprioception for LE, Glutes and Core   [x]  Manual therapy as indicated for LE, Hip and spine to include: Dry Needling/IASTM, STM, PROM, Gr I-IV mobilizations, manipulation. [x] Modalities as needed that may include: thermal agents, E-stim, Biofeedback, US, iontophoresis as indicated  [x] Patient education on joint protection, postural re-education, activity modification, progression of HEP. HEP instruction: Written HEP instructions provided and reviewed (see scanned image in ). GOALS:  Patient stated goal: Kneel/Squat/Sitting/Standing without pain    Therapist goals for Patient:   Short Term Goals: To be achieved in: 2 weeks  1.

## 2019-07-29 NOTE — FLOWSHEET NOTE
[address] [facility]    Physical Therapy Daily Treatment Note  Date:  2019    Patient Name:  Marizol Son    :  1985  MRN: [id number]  Medical/Treatment Diagnosis Information:  Diagnosis: M25.561 (ICD-10-CM) - Right knee pain, unspecified chronicity, M22.2X1 (ICD-10-CM) - Patellofemoral pain syndrome of right knee, M75.40 (ICD-10-CM) - Impingement syndrome of shoulder region, unspecified laterality  Treatment Diagnosis: Pt with decrease strength, patellar hypermobility, and increased pain in R knee  Insurance/Certification information:  PT Insurance Information: [facility]  Physician Information:  Referring Practitioner: Felicia Ro of care signed (Y/N):     Date of Patient follow up with Physician:     Progress Note: [x]  Yes  []  No  Next due by: Visit #10       Latex Allergy:  [x]NO      []YES  Preferred Language for Healthcare:   [x]English       []other:    Visit # Insurance Allowable Date Range   1 25 NA     Pain level:  0/10     SUBJECTIVE:  See eval    OBJECTIVE: See eval      RESTRICTIONS/PRECAUTIONS: None    Exercises/Interventions:     Therapeutic Exercise (06907)   14' Resistance / level Sets/sec Reps Notes / Cues   SLR ABD  2 10 Added  HEP   Bridges None/green band  10 Added  HEP   Lateral Band Step green 3 laps  Added  HEP   LAQ with medial patellar OP 2# 1 20 Added  LAQ HEP                                             Therapeutic Activities (82362)                                   Neuromuscular Re-ed (35955)                            Manual Intervention (18424) 10'       Knee mobs/PROM       Tib/Fem Mobs       Patella Mobs       Ankle mobs       STM R TFL, Glute Med, IT band insertion 10'                Pt. Education:  -pt educated on diagnosis, prognosis and expectations for rehab  -pt provided with written and illustrated instructions for HEP  -all pt questions were answered    Therapeutic Exercise and NMR EXR  [x] (18074) Provided verbal/tactile cueing for activities related to strengthening, flexibility, endurance, ROM for improvements in LE, proximal hip, and core control with self care, mobility, lifting, ambulation.  [] (83282) Provided verbal/tactile cueing for activities related to improving balance, coordination, kinesthetic sense, posture, motor skill, proprioception  to assist with LE, proximal hip, and core control in self care, mobility, lifting, ambulation and eccentric single leg control.      NMR and Therapeutic Activities:    [] (11234 or 24065) Provided verbal/tactile cueing for activities related to improving balance, coordination, kinesthetic sense, posture, motor skill, proprioception and motor activation to allow for proper function of core, proximal hip and LE with self care and ADLs  [] (32777) Gait Re-education- Provided training and instruction to the patient for proper LE, core and proximal hip recruitment and positioning and eccentric body weight control with ambulation re-education including up and down stairs     Home Exercise Program:    [x] (59544) Reviewed/Progressed HEP activities related to strengthening, flexibility, endurance, ROM of core, proximal hip and LE for functional self-care, mobility, lifting and ambulation/stair navigation   [] (76539)Reviewed/Progressed HEP activities related to improving balance, coordination, kinesthetic sense, posture, motor skill, proprioception of core, proximal hip and LE for self care, mobility, lifting, and ambulation/stair navigation      Manual Treatments:  PROM / STM / Oscillations-Mobs:  G-I, II, III, IV (PA's, Inf., Post.)  [x] (85406) Provided manual therapy to mobilize LE, proximal hip and/or LS spine soft tissue/joints for the purpose of modulating pain, promoting relaxation,  increasing ROM, reducing/eliminating soft tissue swelling/inflammation/restriction, improving soft tissue extensibility and allowing for proper ROM for normal function with self care, mobility, lifting and ambulation. Modalities:  [] (00160) Vasopneumatic compression: Utilized vasopneumatic compression to decrease edema / swelling for the purpose of improving mobility and quad tone / recruitment which will allow for increased overall function including but not limited to self-care, transfers, ambulation, and ascending / descending stairs. Modalities:  Deferred     Charges:  Timed Code Treatment Minutes: 24'   Total Treatment Minutes: 59'     [x] EVAL - LOW (06206)   [] EVAL - MOD (74912)  [] EVAL - HIGH (75860)  [] RE-EVAL (98475)  [x] TR(49387) x  1    [] Ionto  [] NMR (55775) x      [] Vaso  [x] Manual (67832) x 1     [] Ultrasound  [] TA x       [] Mech Traction (23451)  [] Aquatic Therapy x     [] ES (un) (53484):   [] Home Management Training x [] ES(attended) (84453)   [] Group:     [] Other:     GOALS:  Patient stated goal: Kneel/Squat/Sitting/Standing without pain    Therapist goals for Patient:   Short Term Goals: To be achieved in: 2 weeks  1. Independent in HEP and progression per patient tolerance, in order to prevent re-injury. 2. Patient will have a decrease in pain to facilitate improvement in movement, function, and ADLs as indicated by Functional Deficits. Long Term Goals: To be achieved in: 6 weeks  1. Disability index score of 12% or less for the LEFS to assist with reaching prior level of function. 2. Patient will demonstrate decreased (no more than 2/10) pain/stiffness upon rising after prolonged sitting for improved function. 3. Patient will return to functional activities including kneeling/squatting without increased symptoms or restriction. 4. Pt will demonstrate ability to walk . 5 miles without increased pain/stiffness. Progression Towards Functional goals:  [] Patient is progressing as expected towards functional goals listed. [] Progression is slowed due to complexities listed.   [] Progression has been slowed due

## 2019-08-05 ENCOUNTER — HOSPITAL ENCOUNTER (OUTPATIENT)
Dept: PHYSICAL THERAPY | Age: 34
Setting detail: THERAPIES SERIES
Discharge: HOME | End: 2019-08-05
Payer: COMMERCIAL

## 2019-08-05 ENCOUNTER — APPOINTMENT (OUTPATIENT)
Dept: PHYSICAL THERAPY | Age: 34
End: 2019-08-05
Payer: COMMERCIAL

## 2019-08-05 PROCEDURE — 97140 MANUAL THERAPY 1/> REGIONS: CPT

## 2019-08-05 PROCEDURE — 97110 THERAPEUTIC EXERCISES: CPT

## 2019-08-12 ENCOUNTER — HOSPITAL ENCOUNTER (OUTPATIENT)
Dept: PHYSICAL THERAPY | Age: 34
Setting detail: THERAPIES SERIES
Discharge: HOME | End: 2019-08-12
Payer: COMMERCIAL

## 2019-08-12 PROCEDURE — 97140 MANUAL THERAPY 1/> REGIONS: CPT

## 2019-08-12 PROCEDURE — 97110 THERAPEUTIC EXERCISES: CPT

## 2019-08-15 ENCOUNTER — HOSPITAL ENCOUNTER (OUTPATIENT)
Dept: PHYSICAL THERAPY | Age: 34
Setting detail: THERAPIES SERIES
Discharge: HOME | End: 2019-08-15
Payer: COMMERCIAL

## 2019-08-19 ENCOUNTER — HOSPITAL ENCOUNTER (OUTPATIENT)
Dept: PHYSICAL THERAPY | Age: 34
Setting detail: THERAPIES SERIES
Discharge: HOME | End: 2019-08-19
Payer: COMMERCIAL

## 2019-08-19 PROCEDURE — 97140 MANUAL THERAPY 1/> REGIONS: CPT

## 2019-08-19 PROCEDURE — 97110 THERAPEUTIC EXERCISES: CPT

## 2019-08-19 NOTE — FLOWSHEET NOTE
Press  130# 3 10 Added 8/5   ^8/12 Held   Machine Hamstring Curls  40# 3 10 Added 8/5 Held   SL IR 8lb 3 10 Added 8/19   B ER with band  blue 3 10 ^8/5 Held   SL ER  5# 3 10 ^8/5 ^ 8/19   Scap Retraction  Purple 3 10 ^8/5 ^ 8/19   PNF (D1/D2) with band  blue 3 ea 10 Added 8/5 HEPHeld   Push Up Plus from plinth  3 10 Added 8/5 HEP Held   Stationary Bike ' L1 Warm up Added 8/19   Bent Over Rows 5lb      Prone Quad stretch  30\" 3 Reviewed 8/19   Supine TFL Stretch (R)  30\" 3 Reviewed 8/19                               Therapeutic Activities (28765)                                   Neuromuscular Re-ed (39668)       biodex balance PS L 10 4'  Added 8/12 Held                 Manual Intervention (10614) 25'       Knee mobs/PROM        Supine gentle manually guided pec minor stretch Over towel roll 30\" 3 ea    (L) shoulder mobs distraction Gr III     (R) LE LAD Gr III      STM R TFL, Glute Med, IT band insertion,  10'                Pt. Education:  -pt educated on diagnosis, prognosis and expectations for rehab  -pt provided with written and illustrated instructions for HEP  -all pt questions were answered    Therapeutic Exercise and NMR EXR  [x] (75962) Provided verbal/tactile cueing for activities related to strengthening, flexibility, endurance, ROM for improvements in LE, proximal hip, and core control with self care, mobility, lifting, ambulation.  [] (77290) Provided verbal/tactile cueing for activities related to improving balance, coordination, kinesthetic sense, posture, motor skill, proprioception  to assist with LE, proximal hip, and core control in self care, mobility, lifting, ambulation and eccentric single leg control.      NMR and Therapeutic Activities:    [] (22313 or 52798) Provided verbal/tactile cueing for activities related to improving balance, coordination, kinesthetic sense, posture, motor skill, proprioception and motor activation to allow for proper function of core, proximal hip and LE with self

## 2019-08-22 ENCOUNTER — HOSPITAL ENCOUNTER (OUTPATIENT)
Dept: PHYSICAL THERAPY | Age: 34
Setting detail: THERAPIES SERIES
Discharge: HOME | End: 2019-08-22
Payer: COMMERCIAL

## 2019-08-22 PROCEDURE — 97110 THERAPEUTIC EXERCISES: CPT

## 2019-08-22 PROCEDURE — 97032 APPL MODALITY 1+ESTIM EA 15: CPT

## 2019-08-22 PROCEDURE — 97140 MANUAL THERAPY 1/> REGIONS: CPT

## 2019-08-22 NOTE — FLOWSHEET NOTE
conjunction with Dry Needling:  the Estim was manipulated between all above needles for a period of 12 min. at  4-6 volts. The low frequency electrical stimulation was used to help reduce muscle spasm and help to interrupt /[place] the pain cycle. (42424)       Pt. Education:  -pt educated on diagnosis, prognosis and expectations for rehab  -pt provided with written and illustrated instructions for HEP  -all pt questions were answered    Therapeutic Exercise and NMR EXR  [x] (51716) Provided verbal/tactile cueing for activities related to strengthening, flexibility, endurance, ROM for improvements in LE, proximal hip, and core control with self care, mobility, lifting, ambulation.  [] (86580) Provided verbal/tactile cueing for activities related to improving balance, coordination, kinesthetic sense, posture, motor skill, proprioception  to assist with LE, proximal hip, and core control in self care, mobility, lifting, ambulation and eccentric single leg control.      NMR and Therapeutic Activities:    [] (26259 or 08720) Provided verbal/tactile cueing for activities related to improving balance, coordination, kinesthetic sense, posture, motor skill, proprioception and motor activation to allow for proper function of core, proximal hip and LE with self care and ADLs  [] (87837) Gait Re-education- Provided training and instruction to the patient for proper LE, core and proximal hip recruitment and positioning and eccentric body weight control with ambulation re-education including up and down stairs     Home Exercise Program:    [x] (13052) Reviewed/Progressed HEP activities related to strengthening, flexibility, endurance, ROM of core, proximal hip and LE for functional self-care, mobility, lifting and ambulation/stair navigation   [] (81405)Reviewed/Progressed HEP activities related to improving balance, coordination, kinesthetic sense, posture, motor skill, proprioception of core, proximal hip and LE for self care,

## 2019-08-26 ENCOUNTER — HOSPITAL ENCOUNTER (OUTPATIENT)
Dept: PHYSICAL THERAPY | Age: 34
Setting detail: THERAPIES SERIES
Discharge: HOME | End: 2019-08-26
Payer: COMMERCIAL

## 2019-08-26 PROCEDURE — 97140 MANUAL THERAPY 1/> REGIONS: CPT

## 2019-08-26 PROCEDURE — 97032 APPL MODALITY 1+ESTIM EA 15: CPT

## 2019-08-26 PROCEDURE — 97112 NEUROMUSCULAR REEDUCATION: CPT

## 2019-08-26 PROCEDURE — 97110 THERAPEUTIC EXERCISES: CPT

## 2019-08-26 NOTE — FLOWSHEET NOTE
and motor activation to allow for proper function of core, proximal hip and LE with self care and ADLs  [] (64419) Gait Re-education- Provided training and instruction to the patient for proper LE, core and proximal hip recruitment and positioning and eccentric body weight control with ambulation re-education including up and down stairs     Home Exercise Program:    [x] (25217) Reviewed/Progressed HEP activities related to strengthening, flexibility, endurance, ROM of core, proximal hip and LE for functional self-care, mobility, lifting and ambulation/stair navigation   [] (83592)Reviewed/Progressed HEP activities related to improving balance, coordination, kinesthetic sense, posture, motor skill, proprioception of core, proximal hip and LE for self care, mobility, lifting, and ambulation/stair navigation      Manual Treatments:  PROM / STM / Oscillations-Mobs:  G-I, II, III, IV (PA's, Inf., Post.)  [x] (14592) Provided manual therapy to mobilize LE, proximal hip and/or LS spine soft tissue/joints for the purpose of modulating pain, promoting relaxation,  increasing ROM, reducing/eliminating soft tissue swelling/inflammation/restriction, improving soft tissue extensibility and allowing for proper ROM for normal function with self care, mobility, lifting and ambulation. Modalities:  [] (83046) Vasopneumatic compression: Utilized vasopneumatic compression to decrease edema / swelling for the purpose of improving mobility and quad tone / recruitment which will allow for increased overall function including but not limited to self-care, transfers, ambulation, and ascending / descending stairs.        Modalities:  E-Stim attended, see DN statement above x 12'    Charges:  Timed Code Treatment Minutes: 62'   Total Treatment Minutes: 59'     [] EVAL - LOW (22757)   [] EVAL - MOD (10191)  [] EVAL - HIGH (08911)  [] RE-EVAL (80733)  [x] NF(85354) x  1    [] Ionto  [x] NMR (78783) x 1     [] Vaso  [x] Manual (20176) x 1 demonstrate ability to play (carrying, lifting, putting him to bed) with son with no increase in pain        Progression Towards Functional goals:  [x] Patient is progressing as expected towards functional goals listed. [] Progression is slowed due to complexities listed. [] Progression has been slowed due to co-morbidities. [] Plan just implemented, too soon to assess goals progression  [] Other:     Persisting Functional Limitations/Impairments:  [x]Sitting []Standing   [x]Walking [x]Stairs   []Transfers []ADLs   [x]Squatting/bending [x]Kneeling  []Housework []Job related tasks  []Driving []Sports/Recreation   []Sleeping []Other:    ASSESSMENT:  Pt demonstrates improved general (L) shoulder strength, but would benefit from continued strengthening. Pt's pain severity remains minimal.  Improved soft tissue mobility noted lateral (R) thigh since first DN session. Pt does demonstrate motor control dysfunction as pt activates and relies more heavily on TFL and struggles activating her gluts. Tactile cueing did help with this somewhat today, however, pt needs to work on this at home as well. Pt will be re-assessed next visit. Pt continues to be free of (R) knee pain.   Treatment/Activity Tolerance:  [x] Pt able to complete treatment [] Patient limited by fatique  [] Patient limited by pain  [] Patient limited by other medical complications  [] Other:     Prognosis:  [x] Good [] Fair  [] Poor    Patient Requires Follow-up: [x] Yes  [] No    Return to Play:    [x]  N/A   []  Stage 1: Intro to Strength   []  Stage 2: Return to Run and Strength   []  Stage 3: Return to Jump and Strength   []  Stage 4: Dynamic Strength and Agility   []  Stage 5: Sport Specific Training   []  Ready to Return to Play, Meets All Above Stages   []  Not Ready for Return to Sports   Comments:            PLAN: Assess response to DN  [x] Continue per plan of care [] Alter current plan (see comments)  [] Plan of care initiated [] Hold

## 2019-08-30 ENCOUNTER — HOSPITAL ENCOUNTER (OUTPATIENT)
Dept: PHYSICAL THERAPY | Age: 34
Setting detail: THERAPIES SERIES
Discharge: HOME | End: 2019-08-30
Payer: COMMERCIAL

## 2019-08-30 PROCEDURE — 97110 THERAPEUTIC EXERCISES: CPT

## 2019-08-30 PROCEDURE — 97112 NEUROMUSCULAR REEDUCATION: CPT

## 2019-08-30 NOTE — FLOWSHEET NOTE
activation to allow for proper function of core, proximal hip and LE with self care and ADLs  [] (18474) Gait Re-education- Provided training and instruction to the patient for proper LE, core and proximal hip recruitment and positioning and eccentric body weight control with ambulation re-education including up and down stairs     Home Exercise Program:    [x] (15945) Reviewed/Progressed HEP activities related to strengthening, flexibility, endurance, ROM of core, proximal hip and LE for functional self-care, mobility, lifting and ambulation/stair navigation   [] (61915)Reviewed/Progressed HEP activities related to improving balance, coordination, kinesthetic sense, posture, motor skill, proprioception of core, proximal hip and LE for self care, mobility, lifting, and ambulation/stair navigation      Manual Treatments:  PROM / STM / Oscillations-Mobs:  G-I, II, III, IV (PA's, Inf., Post.)  [x] (80527) Provided manual therapy to mobilize LE, proximal hip and/or LS spine soft tissue/joints for the purpose of modulating pain, promoting relaxation,  increasing ROM, reducing/eliminating soft tissue swelling/inflammation/restriction, improving soft tissue extensibility and allowing for proper ROM for normal function with self care, mobility, lifting and ambulation. Modalities:  [] (16256) Vasopneumatic compression: Utilized vasopneumatic compression to decrease edema / swelling for the purpose of improving mobility and quad tone / recruitment which will allow for increased overall function including but not limited to self-care, transfers, ambulation, and ascending / descending stairs.        Modalities:  deferred    Charges:  Timed Code Treatment Minutes: 36'   Total Treatment Minutes: 43'     [] EVAL - LOW (39839)   [] EVAL - MOD (30552)  [] EVAL - HIGH (26327)  [] RE-EVAL (96042)  [x] PM(02580) x  2    [] Ionto  [x] NMR (25979) x 1     [] Vaso  [] Manual (85350) x   [] Ultrasound  [] TA x       [] Mech Traction (02023)  [] Aquatic Therapy x     [] ES (un) (43658):   [] Home Management Training x [x] ES(attended) (82525)   [] Group:     [] Other:     GOALS:  Patient stated goal: Kneel/Squat/Sitting/Standing without pain    Therapist goals for Patient:   Short Term Goals: To be achieved in: 2 weeks  1. Independent in HEP and progression per patient tolerance, in order to prevent re-injury. 2. Patient will have a decrease in pain to facilitate improvement in movement, function, and ADLs as indicated by Functional Deficits. Long Term Goals: To be achieved in: 6 weeks  1. Disability index score of 12% or less for the LEFS to assist with reaching prior level of function. 2. Patient will demonstrate decreased (no more than 2/10) pain/stiffness upon rising after prolonged sitting for improved function. 3. Patient will return to functional activities including kneeling/squatting without increased symptoms or restriction. 4. Pt will demonstrate ability to walk . 5 miles without increased pain/stiffness. Patient stated goal: To be able to play with son without pain     Therapist goals for Patient:   Short Term Goals: To be achieved in: 2 weeks  1. Independent in HEP and progression per patient tolerance, in order to prevent re-injury. 2. Patient will have a decrease in pain to facilitate improvement in movement, function, and ADLs as indicated by Functional Deficits.     Long Term Goals: To be achieved in: 6 weeks  1. Disability index score of 8% or less for the UEFI or Quick DASH to assist with reaching prior level of function. 2. Patient will demonstrate an increase in Strength in flexion, abduction, ER, and IR to 4+/5 or higher to allow for proper functional mobility as indicated by patients Functional Deficits. 3. Patient will return to functional activities including driving, lifting objects without increased symptoms or restriction.    4. Patient will demonstrate ability to play (carrying, lifting, putting

## 2019-09-09 ENCOUNTER — HOSPITAL ENCOUNTER (OUTPATIENT)
Dept: PHYSICAL THERAPY | Age: 34
Setting detail: THERAPIES SERIES
Discharge: HOME | End: 2019-09-09
Payer: COMMERCIAL

## 2019-09-09 PROCEDURE — 97112 NEUROMUSCULAR REEDUCATION: CPT

## 2019-09-09 PROCEDURE — 97110 THERAPEUTIC EXERCISES: CPT

## 2019-09-17 ENCOUNTER — APPOINTMENT (OUTPATIENT)
Dept: PHYSICAL THERAPY | Age: 34
End: 2019-09-17
Payer: COMMERCIAL

## 2019-09-23 ENCOUNTER — HOSPITAL ENCOUNTER (OUTPATIENT)
Dept: PHYSICAL THERAPY | Age: 34
Setting detail: THERAPIES SERIES
Discharge: HOME | End: 2019-09-23
Payer: COMMERCIAL

## 2019-09-23 PROCEDURE — 97110 THERAPEUTIC EXERCISES: CPT

## 2019-09-23 NOTE — PLAN OF CARE
[address] [facility] [address]     Physical Therapy Discharge Summary    Dear  Dr. Derrick Galaviz,    We had the pleasure of treating the following patient for physical therapy services at [address] [place] [address]. A summary of our findings can be found in the discharge summary below. If you have any questions or concerns regarding these findings, please do not hesitate to contact me at the office phone number checked above. Thank you for the referral.     Physician Signature:________________________________Date:__________________  By signing above (or electronic signature), therapists plan is approved by physician      Functional Outcome: LEFS:  0% LOF  QuickDash: 7% LOF    Overall Response to Treatment:   []Patient is responding well to treatment and improvement is noted with regards  to goals   [x]Patient should continue to improve in reasonable time if they continue HEP   []Patient has plateaued and is no longer responding to skilled PT intervention    []Patient is getting worse and would benefit from return to referring MD   []Patient unable to adhere to initial POC   []Other:     Date range of Visits: 7/15/19-19  Total Visits: 8    Recommendation: [x] Discharge to [facility]. Follow up with PT PRN. Date:  2019    Patient Name:  Xochilt Eisenberg    :  1985  MRN: [id number]  Medical/Treatment Diagnosis Information:  Diagnosis: M25.561 (ICD-10-CM) - Right knee pain, unspecified chronicity, M22.2X1 (ICD-10-CM) - Patellofemoral pain syndrome of right knee, M75.40 (ICD-10-CM) - Impingement syndrome of shoulder region, unspecified laterality  Treatment Diagnosis: Pt with decrease strength, patellar hypermobility, and increased pain in R knee   Insurance/Certification information:  PT Insurance Information: [employer] with decrease strength, and increased pain with signs/symptoms consistent with impingement of L shoulder.   Physician Information: Added 8/5 HEP Held   L1 Warm up Added 8/19   Bent Over Rows 8lb   ^ 8/30   Standing IR Purple 2 10 Added 9/23 for HEP                 Therapeutic Activities (00022)                                   Neuromuscular Re-ed (11042) x        Supine Glut squeezes With iso abd into man resistance 3\" x12 Tactile cueing for motor control and maximizing glut activation 8/26   Standing Ball vs wall CW/CCW 2lb 3 rds 20 ea way Added 8/30, cueing to keep shoulder down   Rhythmic Stabilization Bodyblade ER 30\" 3 Added 8/30   Standing Scaption blue 2 5 Manual perturbations at top each rep for 10\" Added 8/30                        Manual Intervention (91303)             30\" 3 ea         3' Gr III    8'               . Pt. Education:  -pt educated on diagnosis, prognosis and expectations for rehab  -pt provided with written and illustrated instructions for HEP  -all pt questions were answered    Therapeutic Exercise and NMR EXR  [x] (05188) Provided verbal/tactile cueing for activities related to strengthening, flexibility, endurance, ROM for improvements in LE, proximal hip, and core control with self care, mobility, lifting, ambulation.  [] (39577) Provided verbal/tactile cueing for activities related to improving balance, coordination, kinesthetic sense, posture, motor skill, proprioception  to assist with LE, proximal hip, and core control in self care, mobility, lifting, ambulation and eccentric single leg control.      NMR and Therapeutic Activities:    [x] (39411 or 11998) Provided verbal/tactile cueing for activities related to improving balance, coordination, kinesthetic sense, posture, motor skill, proprioception and motor activation to allow for proper function of core, proximal hip and LE with self care and ADLs  [] (56989) Gait Re-education- Provided training and instruction to the patient for proper LE, core and proximal hip recruitment and positioning and eccentric body weight control with ambulation re-education

## 2019-10-12 ASSESSMENT — ENCOUNTER SYMPTOMS
RESPIRATORY NEGATIVE: 1
ALLERGIC/IMMUNOLOGIC NEGATIVE: 1
GASTROINTESTINAL NEGATIVE: 1
EYES NEGATIVE: 1

## 2019-10-16 ENCOUNTER — OFFICE VISIT (OUTPATIENT)
Dept: BARIATRICS/WEIGHT MGMT | Age: 34
End: 2019-10-16
Payer: COMMERCIAL

## 2019-10-16 VITALS
SYSTOLIC BLOOD PRESSURE: 116 MMHG | BODY MASS INDEX: 37.95 KG/M2 | RESPIRATION RATE: 16 BRPM | DIASTOLIC BLOOD PRESSURE: 76 MMHG | HEART RATE: 68 BPM | HEIGHT: 65 IN | WEIGHT: 227.8 LBS

## 2019-10-16 DIAGNOSIS — E78.2 HYPERLIPIDEMIA, MIXED: ICD-10-CM

## 2019-10-16 DIAGNOSIS — R73.03 PREDIABETES: Primary | ICD-10-CM

## 2019-10-16 DIAGNOSIS — E66.01 SEVERE OBESITY (BMI 35.0-39.9) WITH COMORBIDITY (HCC): ICD-10-CM

## 2019-10-16 DIAGNOSIS — K21.9 CHRONIC GERD: ICD-10-CM

## 2019-10-16 DIAGNOSIS — Z98.84 S/P LAPAROSCOPIC SLEEVE GASTRECTOMY: ICD-10-CM

## 2019-10-16 PROCEDURE — 1036F TOBACCO NON-USER: CPT | Performed by: NURSE PRACTITIONER

## 2019-10-16 PROCEDURE — G8484 FLU IMMUNIZE NO ADMIN: HCPCS | Performed by: NURSE PRACTITIONER

## 2019-10-16 PROCEDURE — G8417 CALC BMI ABV UP PARAM F/U: HCPCS | Performed by: NURSE PRACTITIONER

## 2019-10-16 PROCEDURE — 99213 OFFICE O/P EST LOW 20 MIN: CPT | Performed by: NURSE PRACTITIONER

## 2019-10-16 PROCEDURE — G8427 DOCREV CUR MEDS BY ELIG CLIN: HCPCS | Performed by: NURSE PRACTITIONER

## 2019-10-16 RX ORDER — FOLIC ACID/MULTIVIT,IRON,MINER .4-18-35
2 TABLET,CHEWABLE ORAL DAILY
COMMUNITY
End: 2020-06-19

## 2019-10-16 RX ORDER — CYANOCOBALAMIN (VITAMIN B-12) 1000 MCG
2 TABLET, EXTENDED RELEASE ORAL
COMMUNITY
End: 2020-06-19

## 2019-12-04 DIAGNOSIS — R73.03 PREDIABETES: ICD-10-CM

## 2019-12-04 DIAGNOSIS — Z98.84 S/P LAPAROSCOPIC SLEEVE GASTRECTOMY: ICD-10-CM

## 2019-12-04 DIAGNOSIS — E66.01 SEVERE OBESITY (BMI 35.0-39.9) WITH COMORBIDITY (HCC): ICD-10-CM

## 2019-12-04 DIAGNOSIS — E78.2 HYPERLIPIDEMIA, MIXED: ICD-10-CM

## 2019-12-04 LAB
A/G RATIO: 1.7 (ref 1.1–2.2)
ALBUMIN SERPL-MCNC: 4.3 G/DL (ref 3.4–5)
ALP BLD-CCNC: 25 U/L (ref 40–129)
ALT SERPL-CCNC: 15 U/L (ref 10–40)
ANION GAP SERPL CALCULATED.3IONS-SCNC: 13 MMOL/L (ref 3–16)
AST SERPL-CCNC: 16 U/L (ref 15–37)
BASOPHILS ABSOLUTE: 0.1 K/UL (ref 0–0.2)
BASOPHILS RELATIVE PERCENT: 0.9 %
BILIRUB SERPL-MCNC: 0.6 MG/DL (ref 0–1)
BUN BLDV-MCNC: 12 MG/DL (ref 7–20)
CALCIUM SERPL-MCNC: 9.3 MG/DL (ref 8.3–10.6)
CHLORIDE BLD-SCNC: 104 MMOL/L (ref 99–110)
CHOLESTEROL, TOTAL: 166 MG/DL (ref 0–199)
CO2: 23 MMOL/L (ref 21–32)
CREAT SERPL-MCNC: 0.6 MG/DL (ref 0.6–1.1)
EOSINOPHILS ABSOLUTE: 0.1 K/UL (ref 0–0.6)
EOSINOPHILS RELATIVE PERCENT: 2.2 %
ESTIMATED AVERAGE GLUCOSE: 108.3 MG/DL
FOLATE: 8.24 NG/ML (ref 4.78–24.2)
GFR AFRICAN AMERICAN: >60
GFR NON-AFRICAN AMERICAN: >60
GLOBULIN: 2.6 G/DL
GLUCOSE BLD-MCNC: 88 MG/DL (ref 70–99)
HBA1C MFR BLD: 5.4 %
HCT VFR BLD CALC: 37.3 % (ref 36–48)
HDLC SERPL-MCNC: 59 MG/DL (ref 40–60)
HEMOGLOBIN: 12.4 G/DL (ref 12–16)
IRON SATURATION: 24 % (ref 15–50)
IRON: 96 UG/DL (ref 37–145)
LDL CHOLESTEROL CALCULATED: 92 MG/DL
LYMPHOCYTES ABSOLUTE: 2.1 K/UL (ref 1–5.1)
LYMPHOCYTES RELATIVE PERCENT: 36.3 %
MCH RBC QN AUTO: 29.5 PG (ref 26–34)
MCHC RBC AUTO-ENTMCNC: 33.3 G/DL (ref 31–36)
MCV RBC AUTO: 88.6 FL (ref 80–100)
MONOCYTES ABSOLUTE: 0.3 K/UL (ref 0–1.3)
MONOCYTES RELATIVE PERCENT: 5.1 %
NEUTROPHILS ABSOLUTE: 3.3 K/UL (ref 1.7–7.7)
NEUTROPHILS RELATIVE PERCENT: 55.5 %
PDW BLD-RTO: 13.7 % (ref 12.4–15.4)
PLATELET # BLD: 228 K/UL (ref 135–450)
PMV BLD AUTO: 8.7 FL (ref 5–10.5)
POTASSIUM SERPL-SCNC: 4.2 MMOL/L (ref 3.5–5.1)
RBC # BLD: 4.21 M/UL (ref 4–5.2)
SODIUM BLD-SCNC: 140 MMOL/L (ref 136–145)
T4 FREE: 1 NG/DL (ref 0.9–1.8)
TOTAL IRON BINDING CAPACITY: 402 UG/DL (ref 260–445)
TOTAL PROTEIN: 6.9 G/DL (ref 6.4–8.2)
TRIGL SERPL-MCNC: 77 MG/DL (ref 0–150)
TSH REFLEX: 4.74 UIU/ML (ref 0.27–4.2)
VITAMIN B-12: 584 PG/ML (ref 211–911)
VITAMIN D 25-HYDROXY: 27.8 NG/ML
VLDLC SERPL CALC-MCNC: 15 MG/DL
WBC # BLD: 5.9 K/UL (ref 4–11)

## 2019-12-07 LAB — VITAMIN B1 WHOLE BLOOD: 101 NMOL/L (ref 70–180)

## 2019-12-08 LAB
ALPHA-TOCOPHEROL: 7.8 MG/L (ref 5.5–18)
GAMMA-TOCOPHEROL: 0.8 MG/L (ref 0–6)
RETINYL PALMITATE: <0.02 MG/L (ref 0–0.1)
VITAMIN A LEVEL: 0.48 MG/L (ref 0.3–1.2)
VITAMIN A, INTERP: NORMAL

## 2019-12-09 ENCOUNTER — OFFICE VISIT (OUTPATIENT)
Dept: FAMILY MEDICINE CLINIC | Age: 34
End: 2019-12-09
Payer: COMMERCIAL

## 2019-12-09 ENCOUNTER — TELEPHONE (OUTPATIENT)
Dept: BARIATRICS/WEIGHT MGMT | Age: 34
End: 2019-12-09

## 2019-12-09 VITALS
OXYGEN SATURATION: 99 % | WEIGHT: 229.4 LBS | HEART RATE: 55 BPM | DIASTOLIC BLOOD PRESSURE: 80 MMHG | BODY MASS INDEX: 38.17 KG/M2 | SYSTOLIC BLOOD PRESSURE: 118 MMHG

## 2019-12-09 DIAGNOSIS — E66.09 CLASS 2 OBESITY DUE TO EXCESS CALORIES WITHOUT SERIOUS COMORBIDITY WITH BODY MASS INDEX (BMI) OF 38.0 TO 38.9 IN ADULT: ICD-10-CM

## 2019-12-09 DIAGNOSIS — E03.9 ACQUIRED HYPOTHYROIDISM: Primary | ICD-10-CM

## 2019-12-09 DIAGNOSIS — L30.9 DERMATITIS: Primary | ICD-10-CM

## 2019-12-09 DIAGNOSIS — E03.9 ACQUIRED HYPOTHYROIDISM: ICD-10-CM

## 2019-12-09 PROCEDURE — 1036F TOBACCO NON-USER: CPT | Performed by: NURSE PRACTITIONER

## 2019-12-09 PROCEDURE — G8417 CALC BMI ABV UP PARAM F/U: HCPCS | Performed by: NURSE PRACTITIONER

## 2019-12-09 PROCEDURE — G8484 FLU IMMUNIZE NO ADMIN: HCPCS | Performed by: NURSE PRACTITIONER

## 2019-12-09 PROCEDURE — G8427 DOCREV CUR MEDS BY ELIG CLIN: HCPCS | Performed by: NURSE PRACTITIONER

## 2019-12-09 PROCEDURE — 99214 OFFICE O/P EST MOD 30 MIN: CPT | Performed by: NURSE PRACTITIONER

## 2019-12-09 RX ORDER — LEVOTHYROXINE SODIUM 0.03 MG/1
25 TABLET ORAL DAILY
Qty: 90 TABLET | Refills: 0 | Status: SHIPPED | OUTPATIENT
Start: 2019-12-09 | End: 2020-06-10 | Stop reason: SDUPTHER

## 2019-12-09 ASSESSMENT — PATIENT HEALTH QUESTIONNAIRE - PHQ9
SUM OF ALL RESPONSES TO PHQ QUESTIONS 1-9: 0
2. FEELING DOWN, DEPRESSED OR HOPELESS: 0
1. LITTLE INTEREST OR PLEASURE IN DOING THINGS: 0
SUM OF ALL RESPONSES TO PHQ9 QUESTIONS 1 & 2: 0
SUM OF ALL RESPONSES TO PHQ QUESTIONS 1-9: 0

## 2019-12-09 ASSESSMENT — ENCOUNTER SYMPTOMS: CONSTIPATION: 0

## 2020-02-20 ENCOUNTER — OFFICE VISIT (OUTPATIENT)
Dept: FAMILY MEDICINE CLINIC | Age: 35
End: 2020-02-20
Payer: COMMERCIAL

## 2020-02-20 VITALS
SYSTOLIC BLOOD PRESSURE: 120 MMHG | HEART RATE: 82 BPM | WEIGHT: 235.6 LBS | BODY MASS INDEX: 39.21 KG/M2 | OXYGEN SATURATION: 99 % | DIASTOLIC BLOOD PRESSURE: 82 MMHG

## 2020-02-20 DIAGNOSIS — E03.9 ACQUIRED HYPOTHYROIDISM: ICD-10-CM

## 2020-02-20 PROBLEM — F41.8 DEPRESSION WITH ANXIETY: Status: ACTIVE | Noted: 2020-02-20

## 2020-02-20 PROBLEM — E66.01 MORBID OBESITY WITH BMI OF 40.0-44.9, ADULT (HCC): Status: RESOLVED | Noted: 2017-11-27 | Resolved: 2020-02-20

## 2020-02-20 LAB
T4 FREE: 1 NG/DL (ref 0.9–1.8)
TSH SERPL DL<=0.05 MIU/L-ACNC: 4.44 UIU/ML (ref 0.27–4.2)

## 2020-02-20 PROCEDURE — 99214 OFFICE O/P EST MOD 30 MIN: CPT | Performed by: NURSE PRACTITIONER

## 2020-02-20 RX ORDER — SERTRALINE HYDROCHLORIDE 25 MG/1
25 TABLET, FILM COATED ORAL DAILY
Qty: 30 TABLET | Refills: 5 | Status: SHIPPED | OUTPATIENT
Start: 2020-02-20 | End: 2020-02-20 | Stop reason: SDUPTHER

## 2020-02-20 RX ORDER — SERTRALINE HYDROCHLORIDE 25 MG/1
25 TABLET, FILM COATED ORAL DAILY
Qty: 30 TABLET | Refills: 5 | Status: SHIPPED | OUTPATIENT
Start: 2020-02-20 | End: 2020-03-20 | Stop reason: SDUPTHER

## 2020-02-20 ASSESSMENT — ENCOUNTER SYMPTOMS: SHORTNESS OF BREATH: 0

## 2020-02-20 NOTE — PATIENT INSTRUCTIONS
Patient Education        Anxiety Disorder: Care Instructions  Your Care Instructions    Anxiety is a normal reaction to stress. Difficult situations can cause you to have symptoms such as sweaty palms and a nervous feeling. In an anxiety disorder, the symptoms are far more severe. Constant worry, muscle tension, trouble sleeping, nausea and diarrhea, and other symptoms can make normal daily activities difficult or impossible. These symptoms may occur for no reason, and they can affect your work, school, or social life. Medicines, counseling, and self-care can all help. Follow-up care is a key part of your treatment and safety. Be sure to make and go to all appointments, and call your doctor if you are having problems. It's also a good idea to know your test results and keep a list of the medicines you take. How can you care for yourself at home? · Take medicines exactly as directed. Call your doctor if you think you are having a problem with your medicine. · Go to your counseling sessions and follow-up appointments. · Recognize and accept your anxiety. Then, when you are in a situation that makes you anxious, say to yourself, \"This is not an emergency. I feel uncomfortable, but I am not in danger. I can keep going even if I feel anxious. \"  · Be kind to your body:  ? Relieve tension with exercise or a massage. ? Get enough rest.  ? Avoid alcohol, caffeine, nicotine, and illegal drugs. They can increase your anxiety level and cause sleep problems. ? Learn and do relaxation techniques. See below for more about these techniques. · Engage your mind. Get out and do something you enjoy. Go to a funny movie, or take a walk or hike. Plan your day. Having too much or too little to do can make you anxious. · Keep a record of your symptoms. Discuss your fears with a good friend or family member, or join a support group for people with similar problems. Talking to others sometimes relieves stress.   · Get involved in play a relaxation tape while you drive a car. When should you call for help? Call 911 anytime you think you may need emergency care. For example, call if:    · You feel you cannot stop from hurting yourself or someone else.   Valorie Liu the numbers for these national suicide hotlines: 3-292-539-TALK (2-557.713.3804) and 9-826-LSUAFDC (6-390.535.5232). If you or someone you know talks about suicide or feeling hopeless, get help right away.   Watch closely for changes in your health, and be sure to contact your doctor if:    · You have anxiety or fear that affects your life.     · You have symptoms of anxiety that are new or different from those you had before. Where can you learn more? Go to [email or web address] and sign in to your [employer] account. Enter P754 in the Bitstamp box to learn more about \"Anxiety Disorder: Care Instructions. \"     If you do not have an account, please click on the \"Sign Up Now\" link. Current as of: May 28, 2019  Content Version: 12.3  © 3437-7132 Healthwise, Incorporated. Care instructions adapted under license by [facility] ([facility]). If you have questions about a medical condition or this instruction, always ask your healthcare professional. [address] any warranty or liability for your use of this information.

## 2020-02-20 NOTE — PROGRESS NOTES
SUBJECTIVE:  Pt is a of 30 y.o. female comes in today with   Chief Complaint   Patient presents with    Anxiety     Discuss anxiety        Patient presenting today for evaluation of anxiety. Present for 2.5 yrs, no previous treatment. Has been worsening for past year. 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 Having panic attacks frequently. Also noticing apathy, lack of enjoyment, feeling overwhelmed, isolation. Hypothyroidism: Recent symptoms: fatigue and weight gain. She denies cold intolerance, hair loss, dry skin and constipation. Patient is  taking her medication most days on an empty stomach. Lab Results   Component Value Date    TSH 7.15 (H) 12/15/2016    TSH 13.40 (H) 11/17/2016    TSH 0.01 (L) 09/27/2016     Obesity: snacking more on junk food. States stomach upset when trying to eat meals. Exercise is hit or miss. Wt Readings from Last 3 Encounters:   02/20/20 235 lb 9.6 oz (106.9 kg)   12/09/19 229 lb 6.4 oz (104.1 kg)   10/16/19 227 lb 12.8 oz (103.3 kg)       Prior to Visit Medications    Medication Sig Taking?  Authorizing Provider   levothyroxine (SYNTHROID) 25 MCG tablet Take 1 tablet by mouth daily Yes RUPERTO Monsalve CNP   multivitamin-iron-minerals-folic acid (CENTRUM) chewable tablet Take 2 tablets by mouth daily Yes Historical Provider, MD   calcium citrate-vitamin D (CITRICAL + D) 315-250 MG-UNIT TABS per tablet Take 2 tablets by mouth daily (with breakfast) Yes Historical Provider, MD   omeprazole (PRILOSEC) 20 MG delayed release capsule Take 1 capsule by mouth Daily Yes RUPERTO Hoffman CNP   acetaminophen (TYLENOL) 325 MG tablet Take 650 mg by mouth as needed for Pain Yes Historical Provider, MD   cyclobenzaprine (FLEXERIL) 5 MG tablet Take 1 tablet by mouth 3 times daily as needed for Muscle spasms Yes RUPERTO Monsalve CNP         Patient's allergies, past medical,

## 2020-04-07 ASSESSMENT — ENCOUNTER SYMPTOMS
ALLERGIC/IMMUNOLOGIC NEGATIVE: 1
EYES NEGATIVE: 1
RESPIRATORY NEGATIVE: 1
GASTROINTESTINAL NEGATIVE: 1

## 2020-04-07 NOTE — PROGRESS NOTES
12/04/2019    CREATININE 0.6 12/04/2019    LABGLOM >60 12/04/2019    LABGLOM >60 02/04/2011    GFRAA >60 12/04/2019    GFRAA >60 07/11/2010    CALCIUM 9.3 12/04/2019    PROT 6.9 12/04/2019    PROT 6.6 02/04/2011    LABALBU 4.3 12/04/2019    AGRATIO 1.7 12/04/2019    BILITOT 0.6 12/04/2019    ALKPHOS 25 12/04/2019    ALT 15 12/04/2019    AST 16 12/04/2019    GLOB 2.6 12/04/2019     Lab Results   Component Value Date    CHOL 166 12/04/2019    TRIG 77 12/04/2019    HDL 59 12/04/2019    LDLCALC 92 12/04/2019    LABVLDL 15 12/04/2019     Lab Results   Component Value Date    TSHREFLEX 4.74 12/04/2019     Lab Results   Component Value Date    IRON 96 12/04/2019    TIBC 402 12/04/2019    LABIRON 24 12/04/2019     Lab Results   Component Value Date    WMTOECLR30 584 12/04/2019    FOLATE 8.24 12/04/2019     Lab Results   Component Value Date    VITD25 27.8 12/04/2019     Lab Results   Component Value Date    LABA1C 5.4 12/04/2019    .3 12/04/2019     Review of Systems   Constitutional: Negative. Negative for chills and fever. HENT: Negative. Eyes: Negative. Respiratory: Negative. Negative for cough and shortness of breath. Cardiovascular: Negative. Gastrointestinal: Negative. Endocrine: Negative. Genitourinary: Negative. Musculoskeletal: Negative. Skin: Negative. Allergic/Immunologic: Negative. Neurological: Negative. Hematological: Negative. Psychiatric/Behavioral:        Increased irritability and stress. Was started on Zoloft. PHYSICAL EXAMINATION:    Constitutional: [x] Appears well-developed and well-nourished [x] No apparent distress  (Patient has had increased stress)    [] Abnormal-   Mental status  [x] Alert and awake  [x] Oriented to person/place/time [x]Able to follow commands      Eyes:  EOM    [x]  Normal  [] Abnormal-  Sclera  [x]  Normal  [] Abnormal -         Discharge [x]  None visible  [] Abnormal -    HENT:   [x] Normocephalic, atraumatic.   [] Abnormal

## 2020-04-13 ENCOUNTER — TELEMEDICINE (OUTPATIENT)
Dept: BARIATRICS/WEIGHT MGMT | Age: 35
End: 2020-04-13
Payer: COMMERCIAL

## 2020-04-13 VITALS — WEIGHT: 240 LBS | BODY MASS INDEX: 39.99 KG/M2 | HEIGHT: 65 IN

## 2020-04-13 PROCEDURE — 99213 OFFICE O/P EST LOW 20 MIN: CPT | Performed by: NURSE PRACTITIONER

## 2020-04-13 ASSESSMENT — ENCOUNTER SYMPTOMS
COUGH: 0
SHORTNESS OF BREATH: 0

## 2020-05-21 NOTE — PATIENT INSTRUCTIONS
the lining of the stomach. Carbonated beverages release gas and can expand the stomach.  Continue to keep temptation from your kitchen- Keep your pantry and kitchen cabinets cleaned out of those dangerous foods that might tempt you after surgery (chips, cookies, candy, etc.).  Continue to increase your exercise program- Increase your daily physical activity. Aim for 5-6 days per week for 30 minutes. Walking is an easy way to get started with exercising. You want exercise to be a regular part of your life after surgery.  Make sure you have a good support system- There will be many changes and adjustments to make after surgery. It is important to have a supportive friend, family member or co-worker, etc. with whom you can talk. Continue to attend [facility]) Weight Management support groups as they can be helpful in maintaining behaviors. In addition, it is the responsibility of the patient to schedule and follow up on labs and tests completed after surgery. Results will be reviewed at each visit. We recommend you have the following labs completed by your primary care doctor each year after bariatric surgery: Complete Blood Count (CBC), Complete Metabolic Panel (CMP), Iron Studies, Ha1c, Lipid Panel, TSH (Thyroid), Vitamin A, Vitamin B1, Vitamin B12 & Folate, Vitamin D and Vitamin K. Patient received dietary handouts and education.     Goals:   - Continue plan   - Take 2 MVI / 2 citracal petite (discussed trying bariatric chews)   - Continue to track intake (1200 fara / 60-80g protein)

## 2020-05-21 NOTE — PROGRESS NOTES
[facility]) Physicians   [employer]    2020    TELEHEALTH EVALUATION -- Audio/Visual (During KRCCZ-80 public health emergency)    Subjective:      Patient ID: Tiff Angel is a 30 y.o. female    HPI    2 years 6 months s/p sleeve gastrectomy    Tiff Angel is a 30 y.o. obese female , Body mass index is 39.44 kg/m². Due to the COVID-19 restrictions on close contact interactions the patient's visit was conducted via audio/video in angeli of a face to face visit. Patient has consented to have this visit conducted via audio/video and I am conducting it from the office. The patient is here through telemedicine for their post op bariatric surgery visit. Patient denies any nausea, vomiting, fevers, chills, shortness of breath, chest pain, constipation or urinary symptoms. Denies any heartburn nor dysphagia. Past Medical History:   Diagnosis Date    Abnormal Pap smear of cervix     prior LEEP.  Arthritis     upper cervical area pain    Carpal tunnel syndrome of right wrist 5/10/2017    Gestational diabetes     pre-diabetic--not medically treated    Hypothyroidism     Iron deficiency anemia     Obesity, unspecified     Pancreatitis     PCOS (polycystic ovarian syndrome)     Sleep apnea     requires CPAP while sleeping.      Past Surgical History:   Procedure Laterality Date    CARPAL TUNNEL RELEASE Right 2017    Right carpal tunnel release       SECTION  2016    CHOLECYSTECTOMY, LAPAROSCOPIC  2010    Enriqueta Cui LEEP      [place]    SINUS SURGERY      SLEEVE GASTRECTOMY  2017    laparoscopic - Dr. Motta Current Bilateral     WISDOM TOOTH EXTRACTION       Family History   Problem Relation Age of Onset    High Blood Pressure Mother     Diabetes Maternal Grandfather     High Blood Pressure Maternal Grandfather     Diabetes Maternal Grandmother     Cancer Maternal Grandmother     Alcohol Abuse Father         alcohol     Cancer Paternal Grandmother      Social History     Tobacco Use    Smoking status: Never Smoker    Smokeless tobacco: Never Used   Substance Use Topics    Alcohol use: Yes     Alcohol/week: 0.0 standard drinks     Comment: 1 drink every 6 months     I counseled the patient on the importance of not smoking and risks of ETOH. No Known Allergies  Vitals:    05/27/20 0915   Weight: 237 lb (107.5 kg)   Height: 5' 5\" (1.651 m)     Body mass index is 39.44 kg/m².     Current Outpatient Medications:     sertraline (ZOLOFT) 50 MG tablet, Take 1.5 tablets by mouth daily, Disp: 45 tablet, Rfl: 5    levothyroxine (SYNTHROID) 25 MCG tablet, Take 1 tablet by mouth daily, Disp: 90 tablet, Rfl: 0    multivitamin-iron-minerals-folic acid (CENTRUM) chewable tablet, Take 2 tablets by mouth daily, Disp: , Rfl:     calcium citrate-vitamin D (CITRICAL + D) 315-250 MG-UNIT TABS per tablet, Take 2 tablets by mouth daily (with breakfast), Disp: , Rfl:     omeprazole (PRILOSEC) 20 MG delayed release capsule, Take 1 capsule by mouth Daily, Disp: 30 capsule, Rfl: 3    acetaminophen (TYLENOL) 325 MG tablet, Take 650 mg by mouth as needed for Pain, Disp: , Rfl:     cyclobenzaprine (FLEXERIL) 5 MG tablet, Take 1 tablet by mouth 3 times daily as needed for Muscle spasms, Disp: 30 tablet, Rfl: 0    Lab Results   Component Value Date    WBC 5.9 12/04/2019    RBC 4.21 12/04/2019    HGB 12.4 12/04/2019    HCT 37.3 12/04/2019    MCV 88.6 12/04/2019    MCH 29.5 12/04/2019    MCHC 33.3 12/04/2019    MPV 8.7 12/04/2019    NEUTOPHILPCT 55.5 12/04/2019    LYMPHOPCT 36.3 12/04/2019    MONOPCT 5.1 12/04/2019    EOSRELPCT 2.2 12/04/2019    BASOPCT 0.9 12/04/2019    NEUTROABS 3.3 12/04/2019    LYMPHSABS 2.1 12/04/2019    MONOSABS 0.3 12/04/2019    EOSABS 0.1 12/04/2019     Lab Results   Component Value Date     12/04/2019    K 4.2 12/04/2019     12/04/2019    CO2 23 12/04/2019    ANIONGAP 13 12/04/2019    GLUCOSE 88 12/04/2019    BUN 12 time was spent counseling the patient on proper dietary behaviors, exercise and post-op progress. An electronic signature was used to authenticate this note. Pursuant to the emergency declaration under the [address] [place] [address], [facility] waiver authority and the [employer] and [employer] Act, this Virtual  Visit was conducted, with patient's consent, to reduce the patient's risk of exposure to COVID-19 and provide continuity of care for an established patient. Services were provided through a video synchronous discussion virtually to substitute for in-person clinic visit.

## 2020-05-26 ENCOUNTER — VIRTUAL VISIT (OUTPATIENT)
Dept: FAMILY MEDICINE CLINIC | Age: 35
End: 2020-05-26
Payer: COMMERCIAL

## 2020-05-26 ENCOUNTER — PATIENT MESSAGE (OUTPATIENT)
Dept: FAMILY MEDICINE CLINIC | Age: 35
End: 2020-05-26

## 2020-05-26 VITALS — HEART RATE: 55 BPM | WEIGHT: 237 LBS | BODY MASS INDEX: 39.49 KG/M2 | TEMPERATURE: 96.8 F | HEIGHT: 65 IN

## 2020-05-26 PROCEDURE — 99213 OFFICE O/P EST LOW 20 MIN: CPT | Performed by: NURSE PRACTITIONER

## 2020-05-26 ASSESSMENT — ENCOUNTER SYMPTOMS: SHORTNESS OF BREATH: 0

## 2020-05-26 NOTE — PROGRESS NOTES
daily as needed for Muscle spasms Yes RUPERTO Chand CNP   levothyroxine (SYNTHROID) 25 MCG tablet Take 1 tablet by mouth daily  RUPERTO Chand CNP       Past Medical History:   Diagnosis Date    Abnormal Pap smear of cervix     prior LEEP.  Arthritis     upper cervical area pain    Carpal tunnel syndrome of right wrist 5/10/2017    Gestational diabetes     pre-diabetic--not medically treated    Hypothyroidism     Iron deficiency anemia     Obesity, unspecified     Pancreatitis     PCOS (polycystic ovarian syndrome)     Sleep apnea     requires CPAP while sleeping. Past Surgical History:   Procedure Laterality Date    CARPAL TUNNEL RELEASE Right 2017    Right carpal tunnel release       SECTION  2016    CHOLECYSTECTOMY, LAPAROSCOPIC  2010    Alfredo Lawler LEEP      [place]    SINUS SURGERY      SLEEVE GASTRECTOMY  2017    laparoscopic - Dr. Shaye Benitez Bilateral     WISDOM TOOTH EXTRACTION         Family History   Problem Relation Age of Onset    High Blood Pressure Mother     Diabetes Maternal Grandfather     High Blood Pressure Maternal Grandfather     Diabetes Maternal Grandmother     Cancer Maternal Grandmother     Alcohol Abuse Father         alcohol     Cancer Paternal Grandmother        No Known Allergies    Social History     Tobacco Use    Smoking status: Never Smoker    Smokeless tobacco: Never Used   Substance Use Topics    Alcohol use: Yes     Alcohol/week: 0.0 standard drinks     Comment: 1 drink every 6 months    Drug use: No          PHYSICAL EXAMINATION:  Vital Signs: (As obtained by patient/caregiver or practitioner observation)  Pulse 55   Temp 96.8 °F (36 °C)   Ht 5' 5\" (1.651 m)   Wt 237 lb (107.5 kg)   BMI 39.44 kg/m²   Respiratory rate appears normal      Constitutional: Appears well-developed and well-nourished. No apparent distress    Mental status: Alert and awake. Oriented to person/place/mindy.

## 2020-05-27 ENCOUNTER — TELEMEDICINE (OUTPATIENT)
Dept: BARIATRICS/WEIGHT MGMT | Age: 35
End: 2020-05-27
Payer: COMMERCIAL

## 2020-05-27 VITALS — BODY MASS INDEX: 39.49 KG/M2 | HEIGHT: 65 IN | WEIGHT: 237 LBS

## 2020-05-27 PROCEDURE — 99213 OFFICE O/P EST LOW 20 MIN: CPT | Performed by: NURSE PRACTITIONER

## 2020-05-27 ASSESSMENT — ENCOUNTER SYMPTOMS
COUGH: 0
SHORTNESS OF BREATH: 0

## 2020-06-02 ENCOUNTER — PATIENT MESSAGE (OUTPATIENT)
Dept: BARIATRICS/WEIGHT MGMT | Age: 35
End: 2020-06-02

## 2020-06-02 RX ORDER — OMEPRAZOLE 20 MG/1
20 CAPSULE, DELAYED RELEASE ORAL DAILY
Qty: 30 CAPSULE | Refills: 3 | Status: SHIPPED | OUTPATIENT
Start: 2020-06-02 | End: 2020-10-12

## 2020-06-09 ENCOUNTER — HOSPITAL ENCOUNTER (OUTPATIENT)
Age: 35
Discharge: HOME | End: 2020-06-09
Payer: COMMERCIAL

## 2020-06-09 LAB
T4 FREE: 1.1 NG/DL (ref 0.9–1.8)
TSH SERPL DL<=0.05 MIU/L-ACNC: 2.16 UIU/ML (ref 0.27–4.2)

## 2020-06-09 PROCEDURE — 84443 ASSAY THYROID STIM HORMONE: CPT

## 2020-06-09 PROCEDURE — 36415 COLL VENOUS BLD VENIPUNCTURE: CPT

## 2020-06-09 PROCEDURE — 84439 ASSAY OF FREE THYROXINE: CPT

## 2020-06-10 RX ORDER — LEVOTHYROXINE SODIUM 0.03 MG/1
25 TABLET ORAL DAILY
Qty: 90 TABLET | Refills: 3 | Status: SHIPPED | OUTPATIENT
Start: 2020-06-10 | End: 2020-07-08

## 2020-06-15 ENCOUNTER — VIRTUAL VISIT (OUTPATIENT)
Dept: FAMILY MEDICINE CLINIC | Age: 35
End: 2020-06-15
Payer: COMMERCIAL

## 2020-06-15 PROCEDURE — 99214 OFFICE O/P EST MOD 30 MIN: CPT | Performed by: NURSE PRACTITIONER

## 2020-06-15 ASSESSMENT — ENCOUNTER SYMPTOMS
SORE THROAT: 1
RHINORRHEA: 0

## 2020-06-18 NOTE — PROGRESS NOTES
Guillermina Stokes is a 37 y.o. female. HPI:  Patient presents with multiple complaints of fatigue concerned about lymph nodes in her neck, concerned about thyroid  Had been on a CPAP but stopped a couple years ago, feels refreshed for short time in the morning but then feels very fatigued mid to late morning    Requesting to have lymph nodes in neck evaluated  No fever, she has a tooth that needs attention is not painful    Had a 3 yr old who does not sleep through the night and is in the room at night  Recent thyroid labs somewhat improved    Meds, vitamins and allergies reviewed with pt  Zoloft for depression anxiety, on 75 mg  Recommend healthy eating and regular exercise    Still complaining of breast tenderness, drinks red bull and caffeine, asked her to cut back on her caffeine which can cause breast tenderness    Wt Readings from Last 3 Encounters:   06/19/20 236 lb 9.6 oz (107.3 kg)   05/27/20 237 lb (107.5 kg)   05/26/20 237 lb (107.5 kg)       REVIEW OF SYSTEMS:   CONSTITUTIONAL: See history of present illness,   Weight noted   HEENT: No new vision difficulties or ringing in the ears. RESPIRATORY: No new SOB, PND, orthopnea or cough. CARDIOVASCULAR: no CP, palpitations or SOB with exertion  GI: No nausea, vomiting, diarrhea, constipation, abdominal pain or changes in bowel habits. : No urinary frequency, urgency, incontinence hematuria or dysuria. SKIN: No cyanosis or skin lesions. MUSCULOSKELETAL: No new muscle or joint pain. NEUROLOGICAL: No syncope or TIA-like symptoms. PSYCHIATRIC: No anxiety, insomnia or depression     No Known Allergies    Prior to Visit Medications    Medication Sig Taking?  Authorizing Provider   levothyroxine (SYNTHROID) 25 MCG tablet Take 1 tablet by mouth daily Yes RUPERTO Tidwell - CNP   omeprazole (PRILOSEC) 20 MG delayed release capsule Take 1 capsule by mouth Daily Yes RUPERTO Thompson - CNP   sertraline (ZOLOFT) 50 MG tablet Take 1.5 tablets by ultrasound  - US HEAD NECK SOFT TISSUE THYROID; Future    2. Acquired hypothyroidism  Continue supplement, may consider increasing in the future if patient remains fatigued    3. Severe obesity (BMI 35.0-39. 9) with comorbidity ([address]  Healthy  diet,  regular daily exercise/150 min per week , avoid fast food, pop, juice and eating after dinner  Drink plenty of water daily    4. Sleep apnea, obstructive  Retry CPAP may help with better sleep may help fatigue and weight loss    5.  Depression with anxiety  Consider increasing Zoloft and/or thyroid if CPAP does not help fatigue      25 minutes spent with the pt face-to-face,  >50% spent reviewing test results and discussing plan of care and counseled on importance of healthy diet, regular exercise, trial of CPAP again, received with thyroid ultrasound and follow-up with PCP    Consider adjusting Zoloft and thyroid medicine in the future if symptoms persist

## 2020-06-19 ENCOUNTER — OFFICE VISIT (OUTPATIENT)
Dept: FAMILY MEDICINE CLINIC | Age: 35
End: 2020-06-19
Payer: COMMERCIAL

## 2020-06-19 VITALS
BODY MASS INDEX: 39.42 KG/M2 | WEIGHT: 236.6 LBS | SYSTOLIC BLOOD PRESSURE: 122 MMHG | HEIGHT: 65 IN | TEMPERATURE: 97.3 F | DIASTOLIC BLOOD PRESSURE: 82 MMHG | OXYGEN SATURATION: 97 % | HEART RATE: 68 BPM

## 2020-06-19 PROCEDURE — 99214 OFFICE O/P EST MOD 30 MIN: CPT | Performed by: FAMILY MEDICINE

## 2020-06-27 ENCOUNTER — HOSPITAL ENCOUNTER (OUTPATIENT)
Dept: ULTRASOUND IMAGING | Age: 35
Discharge: HOME | End: 2020-06-27
Payer: COMMERCIAL

## 2020-06-27 PROCEDURE — 76536 US EXAM OF HEAD AND NECK: CPT

## 2020-07-08 RX ORDER — LEVOTHYROXINE SODIUM 0.03 MG/1
TABLET ORAL
Qty: 90 TABLET | Refills: 3 | Status: SHIPPED | OUTPATIENT
Start: 2020-07-08 | End: 2021-03-29 | Stop reason: SDUPTHER

## 2020-07-08 NOTE — TELEPHONE ENCOUNTER
Medication:   Requested Prescriptions     Pending Prescriptions Disp Refills    levothyroxine (SYNTHROID) 25 MCG tablet [Pharmacy Med Name: Levothyroxine Sodium 25 MCG Oral Tablet] 90 tablet 0     Sig: Take 1 tablet by mouth once daily       Last Filled:  06/10/2020 #90 [id number]    Patient Phone Number: [phone] (home)     Last appt: 6/19/2020 Dr. Sarthak Godinez  Next appt: Visit date not found    Last Thyroid:   Lab Results   Component Value Date    TSH 2.16 06/09/2020    FT3 2.9 12/03/2012    T4FREE 1.1 06/09/2020    S3QGUVL 7.1 02/22/2017

## 2020-07-27 ENCOUNTER — TELEMEDICINE (OUTPATIENT)
Dept: BARIATRICS/WEIGHT MGMT | Age: 35
End: 2020-07-27

## 2020-07-27 PROCEDURE — 99999 PR OFFICE/OUTPT VISIT,PROCEDURE ONLY: CPT | Performed by: NURSE PRACTITIONER

## 2020-07-29 ENCOUNTER — VIRTUAL VISIT (OUTPATIENT)
Dept: FAMILY MEDICINE CLINIC | Age: 35
End: 2020-07-29
Payer: COMMERCIAL

## 2020-07-29 ENCOUNTER — OFFICE VISIT (OUTPATIENT)
Dept: PRIMARY CARE CLINIC | Age: 35
End: 2020-07-29
Payer: COMMERCIAL

## 2020-07-29 PROCEDURE — 99211 OFF/OP EST MAY X REQ PHY/QHP: CPT | Performed by: NURSE PRACTITIONER

## 2020-07-29 PROCEDURE — 99214 OFFICE O/P EST MOD 30 MIN: CPT | Performed by: NURSE PRACTITIONER

## 2020-07-29 RX ORDER — SERTRALINE HYDROCHLORIDE 100 MG/1
100 TABLET, FILM COATED ORAL DAILY
Qty: 30 TABLET | Refills: 5 | Status: SHIPPED | OUTPATIENT
Start: 2020-07-29 | End: 2020-09-03 | Stop reason: SDUPTHER

## 2020-07-29 ASSESSMENT — ENCOUNTER SYMPTOMS
BLOOD IN STOOL: 0
ABDOMINAL PAIN: 0
VOMITING: 0
SHORTNESS OF BREATH: 0
NAUSEA: 0
DIARRHEA: 1

## 2020-07-29 NOTE — PROGRESS NOTES
tablet Take 650 mg by mouth as needed for Pain Yes Historical Provider, MD       Past Medical History:   Diagnosis Date    Abnormal Pap smear of cervix     prior LEEP.  Arthritis     upper cervical area pain    Carpal tunnel syndrome of right wrist 5/10/2017    Gestational diabetes     pre-diabetic--not medically treated    Hypothyroidism     Iron deficiency anemia     Obesity, unspecified     Pancreatitis     PCOS (polycystic ovarian syndrome)     Sleep apnea     requires CPAP while sleeping. Past Surgical History:   Procedure Laterality Date    CARPAL TUNNEL RELEASE Right 2017    Right carpal tunnel release       SECTION  2016    CHOLECYSTECTOMY, LAPAROSCOPIC  2010    Morteza Sobia LEEP      [place]    SINUS SURGERY      SLEEVE GASTRECTOMY  2017    laparoscopic - Dr. Robb Coppola Bilateral     WISDOM TOOTH EXTRACTION         Family History   Problem Relation Age of Onset    High Blood Pressure Mother     Diabetes Maternal Grandfather     High Blood Pressure Maternal Grandfather     Diabetes Maternal Grandmother     Cancer Maternal Grandmother     Alcohol Abuse Father         alcohol     Cancer Paternal Grandmother        No Known Allergies    Social History     Tobacco Use    Smoking status: Never Smoker    Smokeless tobacco: Never Used   Substance Use Topics    Alcohol use: Yes     Alcohol/week: 0.0 standard drinks     Comment: 1 drink every 6 months    Drug use: No          PHYSICAL EXAMINATION:  Vital Signs: (As obtained by patient/caregiver or practitioner observation)  There were no vitals taken for this visit. Respiratory rate appears normal      Constitutional: Appears well-developed and well-nourished. No apparent distress    Mental status: Alert and awake. Oriented to person/place/mindy. Able to follow commands    Eyes: EOM normal. Sclera normal. No discharge visible  HENT: Normocephalic, atraumatic.    Mouth/Throat: Mucous membranes are moist. External Ears Normal    Neck: No visualized mass   Pulmonary/Chest: Respiratory effort normal.  No visualized signs of difficulty breathing or respiratory distress        Musculoskeletal:  Normal range of motion of neck  Neurological:   No Facial Asymmetry. No gaze palsy       Skin: patch of pinpoint red papules noted. No significant exanthematous lesions or discoloration noted on facial skin       Psychiatric: Normal Affect. No Hallucinations            ASSESSMENT/PLAN:  1. Depression with anxiety  Worsening  Increase today- sertraline (ZOLOFT) 100 MG tablet; Take 1 tablet by mouth daily  Dispense: 30 tablet; Refill: 5  Pt declining counseling with Dr Abdiel Morrow    2. Dermatitis  New problem  ? Etiology. Continue Allegra     3. Acute nonintractable headache, unspecified headache type  Suspect allergies  Trial Sudafed, push fluids    4. Diarrhea, unspecified type  Suspect IBS. Given Flu clinic number to schedule testing          Return in about 6 weeks (around 9/9/2020) for depression follow up, anxiety follow up. Renetta Lake is a 37 y.o. female being evaluated by a Virtual Visit (video visit) encounter to address concerns as mentioned above. A caregiver was present when appropriate. Due to this being a TeleHealth encounter (During NCLZD-62 public health emergency), evaluation of the following organ systems was limited: Vitals/Constitutional/EENT/Resp/CV/GI//MS/Neuro/Skin/Heme-Lymph-Imm. Pursuant to the emergency declaration under the [facility], [address] [place] [address] authority and the [employer] and [employer] Act, this Virtual Visit was conducted with patient's (and/or legal guardian's) consent, to reduce the patient's risk of exposure to COVID-19 and provide necessary medical care.   The patient (and/or legal guardian) has also been advised to contact this office for worsening conditions or problems, and seek

## 2020-07-29 NOTE — PATIENT INSTRUCTIONS

## 2020-07-30 LAB
SARS-COV-2: NOT DETECTED
SOURCE: NORMAL

## 2020-08-20 ASSESSMENT — ENCOUNTER SYMPTOMS
ALLERGIC/IMMUNOLOGIC NEGATIVE: 1
GASTROINTESTINAL NEGATIVE: 1
RESPIRATORY NEGATIVE: 1
SHORTNESS OF BREATH: 0
COUGH: 0
EYES NEGATIVE: 1

## 2020-08-20 NOTE — PROGRESS NOTES
[facility]) Physicians   [employer]    2020    TELEHEALTH EVALUATION -- Audio/Visual (During KYGJA-64 public health emergency)    Subjective:      Patient ID: Casey Garrison is a 37 y.o. female    HPI    2 years 6 months s/p sleeve gastrectomy    Casey Garrison is a 37 y.o. obese female , Body mass index is 38.61 kg/m². Due to the COVID-19 restrictions on close contact interactions the patient's visit was conducted via audio/video in angeli of a face to face visit. Patient has consented to have this visit conducted via audio/video and I am conducting it from the office. The patient is here through telemedicine for their post op bariatric surgery visit. Patient denies any nausea, vomiting, fevers, chills, shortness of breath, chest pain, constipation or urinary symptoms. Denies any heartburn nor dysphagia. Past Medical History:   Diagnosis Date    Abnormal Pap smear of cervix     prior LEEP.  Arthritis     upper cervical area pain    Carpal tunnel syndrome of right wrist 5/10/2017    Gestational diabetes     pre-diabetic--not medically treated    Hypothyroidism     Iron deficiency anemia     Obesity, unspecified     Pancreatitis     PCOS (polycystic ovarian syndrome)     Sleep apnea     requires CPAP while sleeping.      Past Surgical History:   Procedure Laterality Date    CARPAL TUNNEL RELEASE Right 2017    Right carpal tunnel release       SECTION  2016    CHOLECYSTECTOMY, LAPAROSCOPIC  2010    Betsy Knight LEETOMASZ      [place]    SINUS SURGERY      SLEEVE GASTRECTOMY  2017    laparoscopic - Dr. Kyle Miranda Bilateral     WISDOM TOOTH EXTRACTION       Family History   Problem Relation Age of Onset    High Blood Pressure Mother     Diabetes Maternal Grandfather     High Blood Pressure Maternal Grandfather     Diabetes Maternal Grandmother     Cancer Maternal Grandmother     Alcohol Abuse Father         alcohol     Skin:        [x] No significant exanthematous lesions or discoloration noted on facial skin         [] Abnormal-            Psychiatric:       [x] Normal Affect [x] No Hallucinations        [] Abnormal-     Other pertinent observable physical exam findings-     Due to this being a TeleHealth encounter, evaluation of the following organ systems is limited: Vitals/Constitutional/EENT/Resp/CV/GI//MS/Neuro/Skin/Heme-Lymph-Imm. Assessment and Plan:   Patient is here via telemedicine and is 2 years 9 months s/p sleeve gastrectomy, down 5 lbs with a total weight loss of 56 lbs. The patient's current Body mass index is 38.61 kg/m². (8/28/20). She is doing well, denies n/v/dysphagia or reflux. She is tolerating diet, getting adequate fluids and protein. She is exercising with walking and chasing her son around. Encouraged continued physical activity. She is taking vitamins as instructed. She did speak with the registered dietitian for continued follow up. I agree with recommendations and plan. We will see her back in 3 months for continued follow up or via telemedicine depending on COVID-19 restrictions at the time of their next appointment. Goal for 6 lbs of weight loss at her next appointment. A total of 15 minutes was spent conversing with the patient and over half of that time was spent counseling the patient on proper dietary behaviors, exercise and post-op progress. An electronic signature was used to authenticate this note. Pursuant to the emergency declaration under the [facility], [facility] waiver authority and the [employer] Act, this Virtual  Visit was conducted, with patient's consent, to reduce the patient's risk of exposure to COVID-19 and provide continuity of care for an established patient.     Services were provided through a video synchronous discussion virtually to substitute for in-person clinic visit.

## 2020-08-20 NOTE — PATIENT INSTRUCTIONS
Diet tips to help make you successful postoperatively    Eating habits after surgery will need to be a long-term change. Eating habits are so ingrained that it can be difficult to change so having made these changes for surgery is a great accomplishment. It is important to maintain these new eating habits after surgery. Also, remember that overall health, age, and genetics make each person's weight loss progress different. Do not compare your progress, the amount you eat, or exercise to other patients.  Protein first at every meal- Eat the protein portion of your meal first. Eating protein helps the body feel full and sends a signal to stop eating. Protein is very important in building tissue in the body.  Eat at least 4 times per day- This includes protein supplements and small meals with a high amount of protein   Chewing your food thoroughly- Eating too quickly and improper chewing can cause pain and vomiting after surgery.  Slowing down the speed at which you eat- Refill your fork only after you swallow. Adopt a new pattern of eating by taking a bite of food and putting your utensil down between bites. This will help to reduce the feeling of food being stuck.    Drink water and other fluids slowly- Drink at least 48 ounces per day minimum. Sip fluids as if they were hot beverages. If you find it difficult to stop gulping liquids, try using a sippy cup or a sport top water bottle.  Make sure you are eating meals without drinking fluids- After surgery you will not be allowed to drink fluids 30 minutes before, during, or 30 minutes after your meal (30/30/30 rule). This will be a life-long behavior change. The reason for the rule is to keep food from passing through your smaller stomach more rapidly.  This will cause you to feel hungry again shortly after your meal.   Continue to avoid caffeine and carbonated beverages- Caffeine acts as a diuretic and can be dehydrating as well as irritating to the lining of the stomach. Carbonated beverages release gas and can expand the stomach.  Continue to keep temptation from your kitchen- Keep your pantry and kitchen cabinets cleaned out of those dangerous foods that might tempt you after surgery (chips, cookies, candy, etc.).  Continue to increase your exercise program- Increase your daily physical activity. Aim for 5-6 days per week for 30 minutes. Walking is an easy way to get started with exercising. You want exercise to be a regular part of your life after surgery.  Make sure you have a good support system- There will be many changes and adjustments to make after surgery. It is important to have a supportive friend, family member or co-worker, etc. with whom you can talk. Continue to attend [facility]) Weight Management support groups as they can be helpful in maintaining behaviors. In addition, it is the responsibility of the patient to schedule and follow up on labs and tests completed after surgery. Results will be reviewed at each visit. We recommend you have the following labs completed by your primary care doctor each year after bariatric surgery: Complete Blood Count (CBC), Complete Metabolic Panel (CMP), Iron Studies, Ha1c, Lipid Panel, TSH (Thyroid), Vitamin A, Vitamin B1, Vitamin B12 & Folate, Vitamin D and Vitamin K. Patient received dietary handouts and education.     Goals:   Track intake  Take 1200 mg calcium citrate daily (between multi-vitamin & calcium supplement)

## 2020-08-28 ENCOUNTER — VIRTUAL VISIT (OUTPATIENT)
Dept: BARIATRICS/WEIGHT MGMT | Age: 35
End: 2020-08-28
Payer: COMMERCIAL

## 2020-08-28 VITALS — WEIGHT: 232 LBS | BODY MASS INDEX: 38.65 KG/M2 | HEIGHT: 65 IN

## 2020-08-28 PROCEDURE — 99213 OFFICE O/P EST LOW 20 MIN: CPT | Performed by: NURSE PRACTITIONER

## 2020-08-28 NOTE — PROGRESS NOTES
Dietary Assessment Note    Vitals:   Vitals:    20 0912   Weight: 232 lb (105.2 kg)   Height: 5' 5\" (1.651 m)   Patient lost 5 lbs over past ~3 months, per pt report. Total Weight Loss: 56 lbs    Labs reviewed: Reviewed TSH / T4 labs 20    Protein intake: 60-80 grams/day - uses protein shakes some    Fluid intake: >/= 64oz - propel / water / splash / sf koolaide    Multivitamin/mineral intake: yes - 2 MVI (gummies)    Calcium intake: no    Other: Vit B12 / Vit D    Exercise: yes - cardio / chasing her son more outside    Nutrition Assessment: 4 year 8 month post-op visit. B- CC w/ HB egg  S- protein shake OR cheese stick  L- grilled chicken & salad OR recently beans d/t tooth pain (needs root canal)  S- sometimes (depends on the day) cheese stick OR yogurt  D- similar to lunch  S- sometimes peanuts    Amount able to eat per sittin-2 cups depending on the food    Following  rule: doing better with this    Food Intolerances/issues: none    Client Concerns: low energy / low Vit D    Goals:   Track intake  Take 1200 mg calcium citrate daily (between multi-vitamin & calcium supplement)    Plan: f/u as directed    Due to the COVID-19 restrictions on close contact interactions the patient's visit was conducted via telephone in angeli of a face to face visit. The patient is here through telemedicine for their 2 year 8 month post-op visit.     Shruthi Pruett

## 2020-09-03 RX ORDER — SERTRALINE HYDROCHLORIDE 100 MG/1
150 TABLET, FILM COATED ORAL DAILY
Qty: 45 TABLET | Refills: 5 | Status: SHIPPED | OUTPATIENT
Start: 2020-09-03 | End: 2020-11-02

## 2020-11-02 ENCOUNTER — OFFICE VISIT (OUTPATIENT)
Dept: FAMILY MEDICINE CLINIC | Age: 35
End: 2020-11-02
Payer: COMMERCIAL

## 2020-11-02 VITALS
HEART RATE: 57 BPM | SYSTOLIC BLOOD PRESSURE: 112 MMHG | BODY MASS INDEX: 39.11 KG/M2 | WEIGHT: 235 LBS | DIASTOLIC BLOOD PRESSURE: 74 MMHG | OXYGEN SATURATION: 98 %

## 2020-11-02 PROCEDURE — 99213 OFFICE O/P EST LOW 20 MIN: CPT | Performed by: NURSE PRACTITIONER

## 2020-11-02 RX ORDER — DESVENLAFAXINE 50 MG/1
50 TABLET, EXTENDED RELEASE ORAL DAILY
Qty: 30 TABLET | Refills: 5 | Status: SHIPPED | OUTPATIENT
Start: 2020-11-02 | End: 2020-12-15 | Stop reason: SDUPTHER

## 2020-11-02 ASSESSMENT — ENCOUNTER SYMPTOMS: SHORTNESS OF BREATH: 0

## 2020-11-02 NOTE — PROGRESS NOTES
SUBJECTIVE:  Pt is a of 37 y.o. female comes in today with   Chief Complaint   Patient presents with    Anxiety     follow up        Patient presenting today for evaluation of anxiety and depression. Noticing increased irritability. Experiencing panic attacks. Feeling more overwhelmed and tearful. Crying at random times. Increased stress with finances and holidays. Currently taking Zoloft 50 mg.     Prior to Visit Medications    Medication Sig Taking? Authorizing Provider   omeprazole (PRILOSEC) 20 MG delayed release capsule Take 1 capsule by mouth once daily Yes RUPERTO Lopez - CNP   sertraline (ZOLOFT) 100 MG tablet Take 1.5 tablets by mouth daily Yes RUPERTO Perales - CNP   levothyroxine (SYNTHROID) 25 MCG tablet Take 1 tablet by mouth once daily Yes RUPERTO Lucero - CNP   acetaminophen (TYLENOL) 325 MG tablet Take 650 mg by mouth as needed for Pain Yes Historical Provider, MD         Patient's allergies, past medical, surgical, social and family histories werereviewed and updated as appropriate. Review of Systems   Constitutional: Negative for fatigue. Respiratory: Negative for shortness of breath. Cardiovascular: Negative for chest pain and palpitations. Psychiatric/Behavioral: Positive for agitation. Negative for sleep disturbance and suicidal ideas. The patient is nervous/anxious. Physical Exam  Vitals signs reviewed. Constitutional:       Appearance: She is well-developed. Cardiovascular:      Rate and Rhythm: Normal rate and regular rhythm. Heart sounds: Normal heart sounds. No murmur. Pulmonary:      Effort: Pulmonary effort is normal.      Breath sounds: Normal breath sounds. Neurological:      Mental Status: She is alert and oriented to person, place, and time. Psychiatric:         Mood and Affect: Mood normal.         Behavior: Behavior normal.         Thought Content:  Thought content normal.         Judgment: Judgment normal.       Vitals: 11/02/20 1315   BP: 112/74   Pulse: 57   SpO2: 98%   Weight: 235 lb (106.6 kg)             ASSESSMENT:  1. Depression with anxiety        PLAN:  1. Depression with anxiety  Stop Zoloft and start Pristiq  -     desvenlafaxine succinate (PRISTIQ) 50 MG TB24 extended release tablet; Take 1 tablet by mouth daily  See pt instructions  F/u 6 weeks, sooner prn  Discussed use, benefit, and side effects of prescribed medications. All patient questions answered. Pt voiced understanding.

## 2020-11-02 NOTE — PATIENT INSTRUCTIONS
Patient Education        Depression Treatment: Care Instructions  Your Care Instructions     Depression is a condition that affects the way you feel, think, and act. It causes symptoms such as low energy, loss of interest in daily activities, and sadness or grouchiness that goes on for a long time. Depression is very common and affects men and women of all ages. Depression is a medical illness caused by changes in the natural chemicals in your brain. It is not a character flaw, and it does not mean that you are a bad or weak person. It does not mean that you are going crazy. It is important to know that depression can be treated. Medicines, counseling, and self-care can all help. Many people do not get help because they are embarrassed or think that they will get over the depression on their own. But some people do not get better without treatment. Follow-up care is a key part of your treatment and safety. Be sure to make and go to all appointments, and call your doctor if you are having problems. It's also a good idea to know your test results and keep a list of the medicines you take. How can you care for yourself at home? Learn about antidepressant medicines  Antidepressant medicines can improve or end the symptoms of depression. You may need to take the medicine for at least 6 months, and often longer. Keep taking your medicine even if you feel better. If you stop taking it too soon, your symptoms may come back or get worse. You may start to feel better within 1 to 3 weeks of taking antidepressant medicine. But it can take as many as 6 to 8 weeks to see more improvement. Talk to your doctor if you have problems with your medicine or if you do not notice any improvement after 3 weeks. Antidepressants can make you feel tired, dizzy, or nervous. Some people have dry mouth, constipation, headaches, sexual problems, an upset stomach, or diarrhea.  Many of these side effects are mild and go away on their own after you take the medicine for a few weeks. Some may last longer. Talk to your doctor if side effects bother you too much. You might be able to try a different medicine. If you are pregnant or breastfeeding, talk to your doctor about what medicines you can take. Learn about counseling  In many cases, counseling can work as well as medicines to treat mild to moderate depression. Counseling is done by licensed mental health providers, such as psychologists, social workers, and some types of nurses. It can be done in one-on-one sessions or in a group setting. Many people find group sessions helpful. Cognitive-behavioral therapy is a type of counseling. In this treatment therapy, you learn how to see and change unhelpful thinking styles that may be adding to your depression. Counseling and medicines often work well when used together. Here are other things you could try to help with depression:  · Get regular exercise. It may help you feel better. · Plan something pleasant for yourself every day. Include activities that you have enjoyed in the past.  · Get enough sleep. Talk to your doctor if you have problems sleeping. · Eat a balanced diet. If you do not feel hungry, eat small snacks rather than large meals. · Avoid using illegal drugs or marijuana and drinking alcohol. Do not take medicines that have not been prescribed for you. They may interfere with your treatment, or they may make your depression worse. · Spend time with family and friends. It may help to speak openly about your depression with people you trust.  · Take your medicines exactly as prescribed. Call your doctor if you think you are having a problem with your medicine. · Do not make major life decisions while you are depressed. Depression may change the way you think. You will be able to make better decisions after you feel better. · Think positively. Challenge negative thoughts with statements such as \"I am hopeful\";  \"Things will get better\"; and \"I can ask for the help I need. \" Write down these statements and read them often, even if you don't believe them yet. · Be patient with yourself. It took time for your depression to develop, and it will take time for your symptoms to improve. Do not take on too much or be too hard on yourself. · Learn all you can about depression from written and online materials. · Check out behavioral health classes to learn more about dealing with depression. · If you or someone you know talks about suicide, self-harm, or feeling hopeless, get help right away. Call the [address] [place] [address] at 5-469-201-UECA (4-663.548.9695) or text HOME to 079036 to access the [employer] Text Line. Consider saving these numbers in your phone. When should you call for help? Call 911 anytime you think you may need emergency care. For example, call if:    · You feel you cannot stop from hurting yourself or someone else. Call your doctor now or seek immediate medical care if:    · You hear voices.     · You feel much more depressed. Watch closely for changes in your health, and be sure to contact your doctor if:    · You are having problems with your depression medicine.     · You are not getting better as expected. Where can you learn more? Go to https://Fur and Maskpegileb.[employer]. org and sign in to your [employer] account. Enter U222 in the [employer] Education box to learn more about \"Depression Treatment: Care Instructions. \"     If you do not have an account, please click on the \"Sign Up Now\" link. Current as of: January 31, 2020               Content Version: 12.6  © 9306-1910 [employer]. Care instructions adapted under license by [facility] ([facility]). If you have questions about a medical condition or this instruction, always ask your healthcare professional. [address] any warranty or liability for your use of this information.

## 2020-11-20 ASSESSMENT — ENCOUNTER SYMPTOMS
ALLERGIC/IMMUNOLOGIC NEGATIVE: 1
RESPIRATORY NEGATIVE: 1
GASTROINTESTINAL NEGATIVE: 1
EYES NEGATIVE: 1

## 2020-11-20 NOTE — PATIENT INSTRUCTIONS
Diet tips to help make you successful postoperatively    Eating habits after surgery will need to be a long-term change. Eating habits are so ingrained that it can be difficult to change so having made these changes for surgery is a great accomplishment. It is important to maintain these new eating habits after surgery. Also, remember that overall health, age, and genetics make each person's weight loss progress different. Do not compare your progress, the amount you eat, or exercise to other patients.  Protein first at every meal- Eat the protein portion of your meal first. Eating protein helps the body feel full and sends a signal to stop eating. Protein is very important in building tissue in the body.  Eat at least 4 times per day- This includes protein supplements and small meals with a high amount of protein   Chewing your food thoroughly- Eating too quickly and improper chewing can cause pain and vomiting after surgery.  Slowing down the speed at which you eat- Refill your fork only after you swallow. Adopt a new pattern of eating by taking a bite of food and putting your utensil down between bites. This will help to reduce the feeling of food being stuck.    Drink water and other fluids slowly- Drink at least 48 ounces per day minimum. Sip fluids as if they were hot beverages. If you find it difficult to stop gulping liquids, try using a sippy cup or a sport top water bottle.  Make sure you are eating meals without drinking fluids- After surgery you will not be allowed to drink fluids 30 minutes before, during, or 30 minutes after your meal (30/30/30 rule). This will be a life-long behavior change. The reason for the rule is to keep food from passing through your smaller stomach more rapidly.  This will cause you to feel hungry again shortly after your meal.   Continue to avoid caffeine and carbonated beverages- Caffeine acts as a diuretic and can be dehydrating as well as irritating to the lining of the stomach. Carbonated beverages release gas and can expand the stomach.  Continue to keep temptation from your kitchen- Keep your pantry and kitchen cabinets cleaned out of those dangerous foods that might tempt you after surgery (chips, cookies, candy, etc.).  Continue to increase your exercise program- Increase your daily physical activity. Aim for 5-6 days per week for 30 minutes. Walking is an easy way to get started with exercising. You want exercise to be a regular part of your life after surgery.  Make sure you have a good support system- There will be many changes and adjustments to make after surgery. It is important to have a supportive friend, family member or co-worker, etc. with whom you can talk. Continue to attend [facility]) Weight Management support groups as they can be helpful in maintaining behaviors. In addition, it is the responsibility of the patient to schedule and follow up on labs and tests completed after surgery. Results will be reviewed at each visit.  We recommend you have the following labs completed by your primary care doctor each year after bariatric surgery: Complete Blood Count (CBC), Complete Metabolic Panel (CMP), Iron Studies, Ha1c, Lipid Panel, TSH (Thyroid), Vitamin A, Vitamin B1, Vitamin B12 & Folate and Vitamin D.

## 2020-11-20 NOTE — PROGRESS NOTES
Paternal Grandmother      Social History     Tobacco Use    Smoking status: Never Smoker    Smokeless tobacco: Never Used   Substance Use Topics    Alcohol use: Yes     Alcohol/week: 0.0 standard drinks     Comment: 1 drink every 6 months     I counseled the patient on the importance of not smoking and risks of ETOH. No Known Allergies  Vitals:    11/23/20 1302   Weight: 233 lb (105.7 kg)   Height: 5' 5\" (1.651 m)     Body mass index is 38.77 kg/m².     Current Outpatient Medications:     desvenlafaxine succinate (PRISTIQ) 50 MG TB24 extended release tablet, Take 1 tablet by mouth daily, Disp: 30 tablet, Rfl: 5    omeprazole (PRILOSEC) 20 MG delayed release capsule, Take 1 capsule by mouth once daily, Disp: 30 capsule, Rfl: 3    levothyroxine (SYNTHROID) 25 MCG tablet, Take 1 tablet by mouth once daily, Disp: 90 tablet, Rfl: 3    acetaminophen (TYLENOL) 325 MG tablet, Take 650 mg by mouth as needed for Pain, Disp: , Rfl:     Lab Results   Component Value Date    WBC 5.9 12/04/2019    RBC 4.21 12/04/2019    HGB 12.4 12/04/2019    HCT 37.3 12/04/2019    MCV 88.6 12/04/2019    MCH 29.5 12/04/2019    MCHC 33.3 12/04/2019    MPV 8.7 12/04/2019    NEUTOPHILPCT 55.5 12/04/2019    LYMPHOPCT 36.3 12/04/2019    MONOPCT 5.1 12/04/2019    EOSRELPCT 2.2 12/04/2019    BASOPCT 0.9 12/04/2019    NEUTROABS 3.3 12/04/2019    LYMPHSABS 2.1 12/04/2019    MONOSABS 0.3 12/04/2019    EOSABS 0.1 12/04/2019     Lab Results   Component Value Date     12/04/2019    K 4.2 12/04/2019     12/04/2019    CO2 23 12/04/2019    ANIONGAP 13 12/04/2019    GLUCOSE 88 12/04/2019    BUN 12 12/04/2019    CREATININE 0.6 12/04/2019    LABGLOM >60 12/04/2019    LABGLOM >60 02/04/2011    GFRAA >60 12/04/2019    GFRAA >60 07/11/2010    CALCIUM 9.3 12/04/2019    PROT 6.9 12/04/2019    PROT 6.6 02/04/2011    LABALBU 4.3 12/04/2019    AGRATIO 1.7 12/04/2019    BILITOT 0.6 12/04/2019    ALKPHOS 25 12/04/2019    ALT 15 12/04/2019    AST 16 12/04/2019    GLOB 2.6 12/04/2019     Lab Results   Component Value Date    CHOL 166 12/04/2019    TRIG 77 12/04/2019    HDL 59 12/04/2019    LDLCALC 92 12/04/2019    LABVLDL 15 12/04/2019     Lab Results   Component Value Date    TSHREFLEX 4.74 12/04/2019     Lab Results   Component Value Date    IRON 96 12/04/2019    TIBC 402 12/04/2019    LABIRON 24 12/04/2019     Lab Results   Component Value Date    HJLLKARR38 584 12/04/2019    FOLATE 8.24 12/04/2019     Lab Results   Component Value Date    VITD25 27.8 12/04/2019     Lab Results   Component Value Date    LABA1C 5.4 12/04/2019    .3 12/04/2019     Review of Systems   Constitutional: Negative. HENT: Negative. Eyes: Negative. Respiratory: Negative. Cardiovascular: Negative. Gastrointestinal: Negative. Endocrine: Negative. Genitourinary: Negative. Musculoskeletal: Negative. Skin: Negative. Allergic/Immunologic: Negative. Neurological: Negative. Hematological: Negative. Psychiatric/Behavioral: Negative. PHYSICAL EXAMINATION:    Constitutional: [x] Appears well-developed and well-nourished [x] No apparent distress      [] Abnormal-   Mental status  [x] Alert and awake  [x] Oriented to person/place/time [x]Able to follow commands      Eyes:  EOM    [x]  Normal  [] Abnormal-  Sclera  [x]  Normal  [] Abnormal -         Discharge [x]  None visible  [] Abnormal -    HENT:   [x] Normocephalic, atraumatic.   [] Abnormal     Neck: [x] No visualized mass     Pulmonary/Chest: [x] Respiratory effort normal.  [x] No visualized signs of difficulty breathing or respiratory distress        [] Abnormal-      Musculoskeletal:   [] Normal gait with no signs of ataxia         [x] Normal range of motion of neck        [] Abnormal-     Neurological:        [x] No Facial Asymmetry (Cranial nerve 7 motor function) (limited exam to video visit)          [x] No gaze palsy        [] Abnormal-         Skin:        [x] No significant exanthematous lesions or discoloration noted on facial skin         [] Abnormal-            Psychiatric:       [x] Normal Affect [x] No Hallucinations        [] Abnormal-     Other pertinent observable physical exam findings-     Due to this being a TeleHealth encounter, evaluation of the following organ systems is limited: Vitals/Constitutional/EENT/Resp/CV/GI//MS/Neuro/Skin/Heme-Lymph-Imm. Assessment and Plan:   Patient is here via telemedicine and is 3 years s/p sleeve gastrectomy, down 1 lbs with a total weight loss of 55 lbs. The patient's current Body mass index is 38.77 kg/m². (11/23/20). She is doing well, denies n/v/dysphagia or reflux. She is tolerating diet, getting adequate fluids, but needs to be more consistent with protein on a daily basis. 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 Encouraged physical activity as able. She is taking vitamins as instructed. She did speak with the registered dietitian for continued follow up. I agree with recommendations and plan. Patient was switched to a new anti-depressant/anxiety medication and not yet feeling the benefit, but it has only been a few weeks. We will see her back in 3 months for continued follow up or via telemedicine depending on COVID-19 restrictions at the time of their next appointment. Goal for next visit is a loss of 3-4 lbs. A total of 15 minutes was spent conversing with the patient and over half of that time was spent counseling the patient on proper dietary behaviors, exercise and post-op progress. An electronic signature was used to authenticate this note.      Pursuant to the emergency declaration under the [facility] [address] waiver authority and the [employer] Act, this Virtual  Visit was conducted, with patient's consent, to reduce the patient's risk of exposure to COVID-19 and provide continuity of care for an established patient. Services were provided through a video synchronous discussion virtually to substitute for in-person clinic visit.

## 2020-11-23 ENCOUNTER — VIRTUAL VISIT (OUTPATIENT)
Dept: BARIATRICS/WEIGHT MGMT | Age: 35
End: 2020-11-23
Payer: COMMERCIAL

## 2020-11-23 VITALS — WEIGHT: 233 LBS | HEIGHT: 65 IN | BODY MASS INDEX: 38.82 KG/M2

## 2020-11-23 PROCEDURE — 99213 OFFICE O/P EST LOW 20 MIN: CPT | Performed by: NURSE PRACTITIONER

## 2020-12-15 ENCOUNTER — VIRTUAL VISIT (OUTPATIENT)
Dept: FAMILY MEDICINE CLINIC | Age: 35
End: 2020-12-15
Payer: COMMERCIAL

## 2020-12-15 PROCEDURE — 99213 OFFICE O/P EST LOW 20 MIN: CPT | Performed by: NURSE PRACTITIONER

## 2020-12-15 RX ORDER — DESVENLAFAXINE 100 MG/1
100 TABLET, EXTENDED RELEASE ORAL DAILY
Qty: 90 TABLET | Refills: 1 | Status: SHIPPED | OUTPATIENT
Start: 2020-12-15 | End: 2021-03-17 | Stop reason: ALTCHOICE

## 2020-12-15 ASSESSMENT — ENCOUNTER SYMPTOMS: SHORTNESS OF BREATH: 0

## 2020-12-15 NOTE — PATIENT INSTRUCTIONS
Patient Education        Depression Treatment: Care Instructions  Your Care Instructions     Depression is a condition that affects the way you feel, think, and act. It causes symptoms such as low energy, loss of interest in daily activities, and sadness or grouchiness that goes on for a long time. Depression is very common and affects men and women of all ages. Depression is a medical illness caused by changes in the natural chemicals in your brain. It is not a character flaw, and it does not mean that you are a bad or weak person. It does not mean that you are going crazy. It is important to know that depression can be treated. Medicines, counseling, and self-care can all help. Many people do not get help because they are embarrassed or think that they will get over the depression on their own. But some people do not get better without treatment. Follow-up care is a key part of your treatment and safety. Be sure to make and go to all appointments, and call your doctor if you are having problems. It's also a good idea to know your test results and keep a list of the medicines you take. How can you care for yourself at home? Learn about antidepressant medicines  Antidepressant medicines can improve or end the symptoms of depression. You may need to take the medicine for at least 6 months, and often longer. Keep taking your medicine even if you feel better. If you stop taking it too soon, your symptoms may come back or get worse. You may start to feel better within 1 to 3 weeks of taking antidepressant medicine. But it can take as many as 6 to 8 weeks to see more improvement. Talk to your doctor if you have problems with your medicine or if you do not notice any improvement after 3 weeks. Antidepressants can make you feel tired, dizzy, or nervous. Some people have dry mouth, constipation, headaches, sexual problems, an upset stomach, or diarrhea. Many of these side effects are mild and go away on their own after you take the medicine for a few weeks. Some may last longer. Talk to your doctor if side effects bother you too much. You might be able to try a different medicine. If you are pregnant or breastfeeding, talk to your doctor about what medicines you can take. Learn about counseling  In many cases, counseling can work as well as medicines to treat mild to moderate depression. Counseling is done by licensed mental health providers, such as psychologists, social workers, and some types of nurses. It can be done in one-on-one sessions or in a group setting. Many people find group sessions helpful. Cognitive-behavioral therapy is a type of counseling. In this treatment therapy, you learn how to see and change unhelpful thinking styles that may be adding to your depression. Counseling and medicines often work well when used together. Here are other things you could try to help with depression:  · Get regular exercise. It may help you feel better. · Plan something pleasant for yourself every day. Include activities that you have enjoyed in the past.  · Get enough sleep. Talk to your doctor if you have problems sleeping. · Eat a balanced diet. If you do not feel hungry, eat small snacks rather than large meals. · Avoid using illegal drugs or marijuana and drinking alcohol. Do not take medicines that have not been prescribed for you. They may interfere with your treatment, or they may make your depression worse. · Spend time with family and friends. It may help to speak openly about your depression with people you trust.  · Take your medicines exactly as prescribed. Call your doctor if you think you are having a problem with your medicine. · Do not make major life decisions while you are depressed. Depression may change the way you think. You will be able to make better decisions after you feel better. · Think positively. Challenge negative thoughts with statements such as \"I am hopeful\"; \"Things will get better\"; and \"I can ask for the help I need. \" Write down these statements and read them often, even if you don't believe them yet. · Be patient with yourself. It took time for your depression to develop, and it will take time for your symptoms to improve. Do not take on too much or be too hard on yourself. · Learn all you can about depression from written and online materials. · Check out behavioral health classes to learn more about dealing with depression. · If you or someone you know talks about suicide, self-harm, or feeling hopeless, get help right away. Call the [address] [place] [address] at 6-308-233-ZPDO (0-693.319.5255) or text HOME to 473319 to access the [employer] Text Line. Consider saving these numbers in your phone. When should you call for help? Call 831 anytime you think you may need emergency care. For example, call if:    · You feel you cannot stop from hurting yourself or someone else. Call your doctor now or seek immediate medical care if:    · You hear voices.     · You feel much more depressed. Watch closely for changes in your health, and be sure to contact your doctor if:    · You are having problems with your depression medicine.     · You are not getting better as expected. Where can you learn more? Go to https://ema.Verdi[employer]. org and sign in to your [employer] account. Enter Q234 in the [employer] box to learn more about \"Depression Treatment: Care Instructions. \"     If you do not have an account, please click on the \"Sign Up Now\" link. Current as of: January 31, 2020               Content Version: 12.6  © 1390-2017 [employer]. Care instructions adapted under license by [facility] ([facility]). If you have questions about a medical condition or this instruction, always ask your healthcare professional. [address] any warranty or liability for your use of this information.

## 2020-12-15 NOTE — PROGRESS NOTES
Esteban    SINUS SURGERY      SLEEVE GASTRECTOMY  11/27/2017    laparoscopic - Dr. Clari Mariscal Bilateral     WISDOM TOOTH EXTRACTION         Family History   Problem Relation Age of Onset    High Blood Pressure Mother     Diabetes Maternal Grandfather     High Blood Pressure Maternal Grandfather     Diabetes Maternal Grandmother     Cancer Maternal Grandmother     Alcohol Abuse Father         alcohol     Cancer Paternal Grandmother        No Known Allergies    Social History     Tobacco Use    Smoking status: Never Smoker    Smokeless tobacco: Never Used   Substance Use Topics    Alcohol use: Yes     Alcohol/week: 0.0 standard drinks     Comment: 1 drink every 6 months    Drug use: No          PHYSICAL EXAMINATION:  Vital Signs: (As obtained by patient/caregiver or practitioner observation)  There were no vitals taken for this visit. Respiratory rate appears normal    Constitutional: Appears well-developed and well-nourished. No apparent distress    Mental status: Alert and awake. Oriented to person/place/mindy. Able to follow commands    Eyes: EOM normal. Sclera normal. No discharge visible  HENT: Normocephalic, atraumatic. Mouth/Throat: Mucous membranes are moist. External Ears Normal    Neck: No visualized mass   Pulmonary/Chest: Respiratory effort normal.  No visualized signs of difficulty breathing or respiratory distress        Musculoskeletal:  Normal range of motion of neck  Neurological:       No Facial Asymmetry (Cranial nerve 7 motor function) (limited exam to video visit). No gaze palsy       Skin:  No significant exanthematous lesions or discoloration noted on facial skin       Psychiatric: Normal Affect. No Hallucinations            ASSESSMENT/PLAN:  1. Depression with anxiety  Improving  Continue current dose of : - desvenlafaxine succinate (PRISTIQ) 100 MG TB24 extended release tablet; Take 1 tablet by mouth daily  Dispense: 90 tablet;  Refill: 1  Referral to Dr Eden Clark Return in about 6 months (around 6/15/2021). Veena Montoya is a 37 y.o. female being evaluated by a Virtual Visit (video visit) encounter to address concerns as mentioned above. A caregiver was present when appropriate. Due to this being a TeleHealth encounter (During UOXNU-04 public health emergency), evaluation of the following organ systems was limited: Vitals/Constitutional/EENT/Resp/CV/GI//MS/Neuro/Skin/Heme-Lymph-Imm. Pursuant to the emergency declaration under the [address] [place] [address], [address] [place] [address] authority and the [employer] Act, this Virtual Visit was conducted with patient's (and/or legal guardian's) consent, to reduce the patient's risk of exposure to COVID-19 and provide necessary medical care. The patient (and/or legal guardian) has also been advised to contact this office for worsening conditions or problems, and seek emergency medical treatment and/or call 911 if deemed necessary. Patient identification was verified at the start of the visit: Yes    Total time spent on this encounter: Not billed by time minutes    Services were provided through a video synchronous discussion virtually to substitute for in-person clinic visit. Patient and provider were located at their individual homes. --RUPERTO Lamb - CNP on 12/15/2020 at 9:49 AM    An electronic signature was used to authenticate this note. Teo Denney

## 2021-03-08 ENCOUNTER — OFFICE VISIT (OUTPATIENT)
Dept: FAMILY MEDICINE CLINIC | Age: 36
End: 2021-03-08
Payer: COMMERCIAL

## 2021-03-08 VITALS
OXYGEN SATURATION: 98 % | SYSTOLIC BLOOD PRESSURE: 108 MMHG | BODY MASS INDEX: 40.54 KG/M2 | HEART RATE: 75 BPM | WEIGHT: 243.6 LBS | DIASTOLIC BLOOD PRESSURE: 72 MMHG

## 2021-03-08 DIAGNOSIS — H93.8X3 EAR FULLNESS, BILATERAL: ICD-10-CM

## 2021-03-08 DIAGNOSIS — F41.8 DEPRESSION WITH ANXIETY: Primary | ICD-10-CM

## 2021-03-08 PROCEDURE — 99213 OFFICE O/P EST LOW 20 MIN: CPT | Performed by: NURSE PRACTITIONER

## 2021-03-08 ASSESSMENT — ENCOUNTER SYMPTOMS
SHORTNESS OF BREATH: 0
RHINORRHEA: 0
SORE THROAT: 0
COUGH: 0

## 2021-03-08 NOTE — PROGRESS NOTES
SUBJECTIVE:  Pt is a of 37 y.o. female comes in today with   Chief Complaint   Patient presents with    Ear Fullness    Anxiety       Patient presenting today for evaluation of anxiety and depression. Failed Sertraline in past d/t irritability, however helped with motivation. Taking Pristiq helps with irritability, but causing dizziness and HA. However struggling with motivation. Would like genesight. No eval with psychiatry or therapy- unable to afford co-pays    Also c/o ear fullness. Thinks ears need to be irrigated. Denies pain or decreased hearing. Prior to Visit Medications    Medication Sig Taking? Authorizing Provider   desvenlafaxine succinate (PRISTIQ) 100 MG TB24 extended release tablet Take 1 tablet by mouth daily Yes Venora Paola APRN - CNP   omeprazole (PRILOSEC) 20 MG delayed release capsule Take 1 capsule by mouth once daily Yes Kellibernarda Yu APRN - CNP   levothyroxine (SYNTHROID) 25 MCG tablet Take 1 tablet by mouth once daily Yes Renella RHIANNON GraceN - CNP   acetaminophen (TYLENOL) 325 MG tablet Take 650 mg by mouth as needed for Pain Yes Historical Provider, MD         Patient's allergies, past medical, surgical, social and family histories werereviewed and updated as appropriate. Review of Systems   Constitutional: Positive for fatigue. HENT: Positive for ear pain (B pressure). Negative for congestion, postnasal drip, rhinorrhea and sore throat. Respiratory: Negative for cough and shortness of breath. Cardiovascular: Negative for chest pain and palpitations. Psychiatric/Behavioral: Positive for agitation. Negative for suicidal ideas. The patient is not nervous/anxious. Lack of motivation           Physical Exam  Vitals signs reviewed. Constitutional:       Appearance: She is well-developed. Cardiovascular:      Rate and Rhythm: Normal rate and regular rhythm. Heart sounds: Normal heart sounds. No murmur.    Pulmonary:      Effort: Pulmonary effort is

## 2021-03-08 NOTE — PATIENT INSTRUCTIONS
Patient Education        Recovering From Depression: Care Instructions  Your Care Instructions     Taking good care of yourself is important as you recover from depression. In time, your symptoms will fade as your treatment takes hold. Do not give up. Instead, focus your energy on getting better. Your mood will improve. It just takes some time. Focus on things that can help you feel better, such as being with friends and family, eating well, and getting enough rest. But take things slowly. Do not do too much too soon. You will begin to feel better gradually. Follow-up care is a key part of your treatment and safety. Be sure to make and go to all appointments, and call your doctor if you are having problems. It's also a good idea to know your test results and keep a list of the medicines you take. How can you care for yourself at home? Be realistic  · If you have a large task to do, break it up into smaller steps you can handle, and just do what you can. · You may want to put off important decisions until your depression has lifted. If you have plans that will have a major impact on your life, such as marriage, divorce, or a job change, try to wait a bit. Talk it over with friends and loved ones who can help you look at the overall picture first.  · Reaching out to people for help is important. Do not isolate yourself. Let your family and friends help you. Find someone you can trust and confide in, and talk to that person. · Be patient, and be kind to yourself. Remember that depression is not your fault and is not something you can overcome with willpower alone. Treatment is important for depression, just like for any other illness. Feeling better takes time, and your mood will improve little by little. Stay active  · Stay busy and get outside. Take a walk, or try some other light exercise. · Talk with your doctor about an exercise program. Exercise can help with mild depression.   · Go to a movie or concert. Take part in a Mosque activity or other social gathering. Go to a Greatist game. · Ask a friend to have dinner with you. Take care of yourself  · Eat a balanced diet with plenty of fresh fruits and vegetables, whole grains, and lean protein. If you have lost your appetite, eat small snacks rather than large meals. · Avoid using illegal drugs or marijuana and drinking alcohol. Do not take medicines that have not been prescribed for you. They may interfere with medicines you may be taking for depression, or they may make your depression worse. · Take your medicines exactly as they are prescribed. You may start to feel better within 1 to 3 weeks of taking antidepressant medicine. But it can take as many as 6 to 8 weeks to see more improvement. If you have questions or concerns about your medicines, or if you do not notice any improvement by 3 weeks, talk to your doctor. · Continue to take your medicine after your symptoms improve. Taking your medicine for at least 6 months after you feel better can help keep you from getting depressed again. If this isn't the first time you have been depressed, your doctor may recommend you to take medicine even longer. · If you have any side effects from your medicine, tell your doctor. Many side effects are mild and will go away on their own after you have been taking the medicine for a few weeks. Some may last longer. Talk to your doctor if side effects are bothering you too much. You might be able to try a different medicine. · Continue counseling. It may help prevent depression from returning, especially if you've had multiple episodes of depression. Talk with your counselor if you are having a hard time attending your sessions or you think the sessions aren't working. Don't just stop going. · Get enough sleep. Talk to your doctor if you are having problems sleeping. · Avoid sleeping pills unless they are prescribed by the doctor treating your depression.  Sleeping pills may make you groggy during the day, and they may interact with other medicine you are taking. · If you have any other illnesses, such as diabetes, heart disease, or high blood pressure, make sure to continue with your treatment. Tell your doctor about all of the medicines you take, including those with or without a prescription. · If you or someone you know talks about suicide, self-harm, or feeling hopeless, get help right away. Call the [address] [place] [address] at 1-800-273-talk (5-359.157.7918) or text HOME to 249231 to access the Crisis Text Line. Consider saving these numbers in your phone. When should you call for help? Call 911 anytime you think you may need emergency care. For example, call if:    · You feel like hurting yourself or someone else.     · Someone you know has depression and is about to attempt or is attempting suicide. Call your doctor now or seek immediate medical care if:    · You hear voices.     · Someone you know has depression and:  ? Starts to give away his or her possessions. ? Uses illegal drugs or drinks alcohol heavily. ? Talks or writes about death, including writing suicide notes or talking about guns, knives, or pills. ? Starts to spend a lot of time alone. ? Acts very aggressively or suddenly appears calm. Watch closely for changes in your health, and be sure to contact your doctor if:    · You do not get better as expected. Where can you learn more? Go to https://[employer] MediapeLeadiD.IDOMOTICS. org and sign in to your [employer] account. Enter H030 in the [facility] box to learn more about \"Recovering From Depression: Care Instructions. \"     If you do not have an account, please click on the \"Sign Up Now\" link. Current as of: January 31, 2020               Content Version: 12.6  © 9476-0647 [employer]. Care instructions adapted under license by [facility] ([facility]).  If you have questions about a medical condition or this instruction, always ask your healthcare professional. [place] [address] any warranty or liability for your use of this information.

## 2021-03-16 ENCOUNTER — TELEPHONE (OUTPATIENT)
Dept: FAMILY MEDICINE CLINIC | Age: 36
End: 2021-03-16

## 2021-03-16 NOTE — TELEPHONE ENCOUNTER
----- Message from Mark Dutton sent at 3/16/2021  4:26 PM EDT -----  Subject: Results Request    QUESTIONS  Which lab or imaging result is the patient calling about? Mouth Swab   Which provider ordered the test?   At what location was the test performed? Date the test was performed? Additional Information for Provider?   ---------------------------------------------------------------------------  --------------  CALL BACK INFO  What is the best way for the office to contact you? OK to leave message on   voicemail  Preferred Call Back Phone Number?  [phone]

## 2021-03-17 ENCOUNTER — OFFICE VISIT (OUTPATIENT)
Dept: FAMILY MEDICINE CLINIC | Age: 36
End: 2021-03-17
Payer: COMMERCIAL

## 2021-03-17 VITALS
SYSTOLIC BLOOD PRESSURE: 124 MMHG | HEART RATE: 82 BPM | WEIGHT: 242.6 LBS | DIASTOLIC BLOOD PRESSURE: 88 MMHG | BODY MASS INDEX: 40.37 KG/M2 | OXYGEN SATURATION: 98 %

## 2021-03-17 DIAGNOSIS — F41.8 DEPRESSION WITH ANXIETY: Primary | ICD-10-CM

## 2021-03-17 PROCEDURE — 99213 OFFICE O/P EST LOW 20 MIN: CPT | Performed by: NURSE PRACTITIONER

## 2021-03-17 RX ORDER — DESVENLAFAXINE 50 MG/1
50 TABLET, EXTENDED RELEASE ORAL DAILY
Qty: 30 TABLET | Refills: 1 | Status: SHIPPED | OUTPATIENT
Start: 2021-03-17 | End: 2022-04-11

## 2021-03-17 ASSESSMENT — ENCOUNTER SYMPTOMS: SHORTNESS OF BREATH: 0

## 2021-03-17 NOTE — PROGRESS NOTES
SUBJECTIVE:  Pt is a of 37 y.o. female comes in today with   Chief Complaint   Patient presents with    Anxiety     follow up     ADHD     discuss        Patient presenting today for re-evaluation of anxiety and depression. Completed [employer] testing and wanting to review results. States still not tolerating Pristiq well- struggling with daily headaches and dizziness. Failed Sertraline- no improvement in symptoms. Concerned she may have ADHD. 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 At times feels anxious for days- will feel uneasy and SOB and overwhelmed. Unable to find outpatient psych. Prior to Visit Medications    Medication Sig Taking? Authorizing Provider   desvenlafaxine succinate (PRISTIQ) 50 MG TB24 extended release tablet Take 1 tablet by mouth daily Yes RUPERTO Arreguin - CNP   omeprazole (PRILOSEC) 20 MG delayed release capsule Take 1 capsule by mouth once daily Yes RUPERTO Gonsalez - CNP   levothyroxine (SYNTHROID) 25 MCG tablet Take 1 tablet by mouth once daily Yes RUPERTO Coreas - CNP   acetaminophen (TYLENOL) 325 MG tablet Take 650 mg by mouth as needed for Pain Yes Historical Provider, MD         Patient's allergies, past medical, surgical, social and family histories werereviewed and updated as appropriate. Review of Systems   Respiratory: Negative for shortness of breath. Cardiovascular: Negative for chest pain and palpitations. Psychiatric/Behavioral: Positive for agitation and decreased concentration. Negative for sleep disturbance and suicidal ideas. The patient is nervous/anxious. The patient is not hyperactive. Physical Exam  Vitals signs reviewed. Constitutional:       Appearance: She is well-developed. HENT:      Head: Normocephalic and atraumatic. Skin:     General: Skin is warm and dry.    Neurological:      Mental Status: She is alert and oriented to person, place, and time. Psychiatric:         Behavior: Behavior normal.         Thought Content: Thought content normal.         Judgment: Judgment normal.       Vitals:    03/17/21 1015   BP: 124/88   Pulse: 82   SpO2: 98%   Weight: 242 lb 9.6 oz (110 kg)             ASSESSMENT:  1. Depression with anxiety        PLAN:  1. Depression with anxiety  Unchanged  Genesight results reviewed with pt today  Decrease and recommend d/c med d/t side effects-     desvenlafaxine succinate (PRISTIQ) 50 MG TB24 extended release tablet; Take 1 tablet by mouth daily  Pt scheduled with Dr Herb Figueroa, psych for eval of anxiety, depression and ADHD  See pt instructions  F/u prn. Discussed use, benefit, and side effects of prescribed medications. All patient questions answered. Pt voiced understanding.

## 2021-03-17 NOTE — PATIENT INSTRUCTIONS
Patient Education        Anxiety Disorder: Care Instructions  Your Care Instructions     Anxiety is a normal reaction to stress. Difficult situations can cause you to have symptoms such as sweaty palms and a nervous feeling. In an anxiety disorder, the symptoms are far more severe. Constant worry, muscle tension, trouble sleeping, nausea and diarrhea, and other symptoms can make normal daily activities difficult or impossible. These symptoms may occur for no reason, and they can affect your work, school, or social life. Medicines, counseling, and self-care can all help. Follow-up care is a key part of your treatment and safety. Be sure to make and go to all appointments, and call your doctor if you are having problems. It's also a good idea to know your test results and keep a list of the medicines you take. How can you care for yourself at home? · Take medicines exactly as directed. Call your doctor if you think you are having a problem with your medicine. · Go to your counseling sessions and follow-up appointments. · Recognize and accept your anxiety. Then, when you are in a situation that makes you anxious, say to yourself, \"This is not an emergency. I feel uncomfortable, but I am not in danger. I can keep going even if I feel anxious. \"  · Be kind to your body:  ? Relieve tension with exercise or a massage. ? Get enough rest.  ? Avoid alcohol, caffeine, nicotine, and illegal drugs. They can increase your anxiety level and cause sleep problems. ? Learn and do relaxation techniques. See below for more about these techniques. · Engage your mind. Get out and do something you enjoy. Go to a funny movie, or take a walk or hike. Plan your day. Having too much or too little to do can make you anxious. · Keep a record of your symptoms. Discuss your fears with a good friend or family member, or join a support group for people with similar problems. Talking to others sometimes relieves stress.   · Get involved in play a relaxation tape while you drive a car. When should you call for help? Call 911 anytime you think you may need emergency care. For example, call if:    · You feel you cannot stop from hurting yourself or someone else. Keep the numbers for these national suicide hotlines: 7-852-192-TALK (5-816.680.6980) and 4-402-CKDAXKJ (5-290.513.9495). If you or someone you know talks about suicide or feeling hopeless, get help right away. Watch closely for changes in your health, and be sure to contact your doctor if:    · You have anxiety or fear that affects your life.     · You have symptoms of anxiety that are new or different from those you had before. Where can you learn more? Go to https://MeetDoctorpeC4 Imagingeb.Surefire Social. org and sign in to your [employer] account. Enter P754 in the [employer] box to learn more about \"Anxiety Disorder: Care Instructions. \"     If you do not have an account, please click on the \"Sign Up Now\" link. Current as of: September 23, 2020               Content Version: 12.8  © 9796-6962 Healthwise, Incorporated. Care instructions adapted under license by [facility] ([facility]). If you have questions about a medical condition or this instruction, always ask your healthcare professional. [address] any warranty or liability for your use of this information.

## 2021-03-18 ENCOUNTER — TELEMEDICINE (OUTPATIENT)
Dept: PSYCHOLOGY | Age: 36
End: 2021-03-18
Payer: COMMERCIAL

## 2021-03-18 DIAGNOSIS — F43.23 ADJUSTMENT DISORDER WITH MIXED ANXIETY AND DEPRESSED MOOD: Primary | ICD-10-CM

## 2021-03-18 PROCEDURE — 90791 PSYCH DIAGNOSTIC EVALUATION: CPT | Performed by: PSYCHOLOGIST

## 2021-03-18 ASSESSMENT — ANXIETY QUESTIONNAIRES
6. BECOMING EASILY ANNOYED OR IRRITABLE: 3-NEARLY EVERY DAY
GAD7 TOTAL SCORE: 10
3. WORRYING TOO MUCH ABOUT DIFFERENT THINGS: 2-OVER HALF THE DAYS

## 2021-03-18 ASSESSMENT — PATIENT HEALTH QUESTIONNAIRE - PHQ9
1. LITTLE INTEREST OR PLEASURE IN DOING THINGS: 0
SUM OF ALL RESPONSES TO PHQ9 QUESTIONS 1 & 2: 0
4. FEELING TIRED OR HAVING LITTLE ENERGY: 2
9. THOUGHTS THAT YOU WOULD BE BETTER OFF DEAD, OR OF HURTING YOURSELF: 0
SUM OF ALL RESPONSES TO PHQ QUESTIONS 1-9: 9
6. FEELING BAD ABOUT YOURSELF - OR THAT YOU ARE A FAILURE OR HAVE LET YOURSELF OR YOUR FAMILY DOWN: 1
8. MOVING OR SPEAKING SO SLOWLY THAT OTHER PEOPLE COULD HAVE NOTICED. OR THE OPPOSITE, BEING SO FIGETY OR RESTLESS THAT YOU HAVE BEEN MOVING AROUND A LOT MORE THAN USUAL: 0
SUM OF ALL RESPONSES TO PHQ QUESTIONS 1-9: 9
3. TROUBLE FALLING OR STAYING ASLEEP: 2

## 2021-03-18 NOTE — PROGRESS NOTES
Behavioral Health Consultation  Luis Aguirre PsyD   Psychologist/ Behavioral Health Consultant  3/18/2021  9:27 AM EDT    Time spent with Patient: 45 minutes  This is patient's first [facility] appointment. Reason for Consult:    Chief Complaint   Patient presents with    Psychiatric Evaluation    Stress     Referring Provider: No referring provider defined for this encounter. Pt provided informed consent for the behavioral health program. Discussed with patient model of service to include the limits of confidentiality (i.e. abuse reporting, suicide intervention, etc.) and short-term intervention focused approach. Pt indicated understanding. Feedback given to PCP. TELEHEALTH VISIT -- Audio/Visual (During ZJWDP-35 public health emergency)  }  Pursuant to the emergency declaration under the [facility], [facility] waiver authority and the [employer] Act, this Virtual Visit was conducted, with patient's consent, to reduce the patient's risk of exposure to COVID-19 and provide continuity of care for an established patient. Services were provided through a video synchronous discussion virtually to substitute for in-person clinic visit. Pt gave verbal informed consent to participate in telehealth services. Conducted a risk-benefit analysis and determined that the patient's presenting problems are consistent with the use of telepsychology. Determined that the patient has sufficient knowledge and skills in the use of technology enabling them to adequately benefit from telepsychology. It was determined that this patient was able to be properly treated without an in-person session. Patient verified that they were currently located at the [facility] address that was provided during registration.     Verified the following information:  Patient's identification: Yes  Patient location: [address] [place] [address]  Patient's call back number: [phone]   Patient's emergency contact's name and number, as well as permission to contact them if needed: Extended Emergency Contact Information  Primary Emergency Contact: Jerry Pete  Address: [address] [place] [address] of [address] Phone: [phone]  Relation: Spouse  Secondary Emergency Contact: Kat Palencia Phone: [phone]  Relation: Parent     Provider location: Laine Flores:  The pt presented with concerns due to anxiety/depression     The pt stated that she was rxed Zoloft which seems to have stopped working    Anxiety/depression was exacerbated by grandmother- suicide attempt 2020  Grandmother  2018, grandfather  shortly after    The pt also reported general life stressors  Runs a small business   Taking care of 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 at home   Was working in real estate, 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      had an ADHD eval and pt thought that perhaps anxiety/depression stems from her having ADHD    The pt noticed that her mind works very quickly, will talk quickly and move through different topics, has difficulty being organized, poor task initiation and completion - cant finish tasks because worried about it not being doen properly and \"messing up when it's done\"     Described herself as an average performing student- bored easily,   Didn't study a lot, Had to reread texts frequently  Nursing school came easily but still had difficulty with reading, graduated      The pt reported that sleep is poor, waking up frequently   Better since taking melatonin     Weight loss surgery  Eats regularly throughout the day, trying to eat balanced meals     Active with her son but doesn't engage in exercise     Alcohol use in social setting     Enjoys going out with family (Sister in law)  Goes camping  Visiting mother in [place]  Doesn't have as many close friends    Reads and watches TV    O:  Overall impression stress for general population, Provided education on the use of medication to treat  depression, anxiety and stress, Discussed various factors related to the development and maintenance of  depression, anxiety and stress (including biological, cognitive, behavioral, and environmental factors), Discussed potential treatments for  depression, anxiety and stress, Discussed benefits of referral for specialty care, Established rapport, Conducted functional assessment, Cresco-setting to identify pt's primary goals for [address] visit / overall health, Supportive techniques and Emphasized self-care as important for managing overall health    Pt Behavioral Change Plan:   See Pt Instructions  - Follow up with PCP/psychiatry for med management  -Continue to follow up for behavioral therapy

## 2021-03-18 NOTE — Clinical Note
Simona Brewer! Thank you for referring this pt to see me. My overall impression is that while she does have some symptoms of inattentiveness/impulsivity, her current difficulties are better explained by stress and overwhelm of demands. It is recommended that she follow up with you or psychiatry for medication and continue to see me to learn general coping skills (breaking down tasks, minimizing expectations and reducing negative self talk, mindfulness and relaxation) increase self care. When she has better coping skills and feels less irritable, we can then revisit an assessment for ADHD.

## 2021-03-24 DIAGNOSIS — F41.8 DEPRESSION WITH ANXIETY: Primary | ICD-10-CM

## 2021-03-24 RX ORDER — BUPROPION HYDROCHLORIDE 150 MG/1
150 TABLET ORAL EVERY MORNING
Qty: 30 TABLET | Refills: 3 | Status: SHIPPED | OUTPATIENT
Start: 2021-03-24 | End: 2021-04-19 | Stop reason: SDUPTHER

## 2021-03-24 RX ORDER — DESVENLAFAXINE 25 MG/1
25 TABLET, EXTENDED RELEASE ORAL DAILY
Qty: 30 TABLET | Refills: 0 | Status: SHIPPED | OUTPATIENT
Start: 2021-03-24 | End: 2022-04-11

## 2021-03-29 DIAGNOSIS — E03.9 ACQUIRED HYPOTHYROIDISM: ICD-10-CM

## 2021-03-29 RX ORDER — LEVOTHYROXINE SODIUM 0.03 MG/1
TABLET ORAL
Qty: 90 TABLET | Refills: 3 | Status: SHIPPED | OUTPATIENT
Start: 2021-03-29 | End: 2022-03-29 | Stop reason: SDUPTHER

## 2021-03-29 NOTE — TELEPHONE ENCOUNTER
Medication and Quantity requested: levothyroxine (SYNTHROID) 25 MCG tablet          Last Visit  3/17/21    Pharmacy and phone number updated in EPIC:  Yes [facility]

## 2021-03-29 NOTE — TELEPHONE ENCOUNTER
Medication:   Requested Prescriptions     Pending Prescriptions Disp Refills    levothyroxine (SYNTHROID) 25 MCG tablet 90 tablet 3     Sig: Take 1 tablet by mouth once daily       Last Filled:      Patient Phone Number: [phone] (home)     Last appt: 3/17/2021   Next appt: Visit date not found    Last Thyroid:   Lab Results   Component Value Date    TSH 2.16 06/09/2020    FT3 2.9 12/03/2012    T4FREE 1.1 06/09/2020    R0YCOFV 7.1 02/22/2017

## 2021-04-19 DIAGNOSIS — F41.8 DEPRESSION WITH ANXIETY: ICD-10-CM

## 2021-04-19 RX ORDER — BUPROPION HYDROCHLORIDE 300 MG/1
300 TABLET ORAL EVERY MORNING
Qty: 90 TABLET | Refills: 1 | Status: SHIPPED | OUTPATIENT
Start: 2021-04-19 | End: 2022-04-11

## 2021-04-26 ENCOUNTER — OFFICE VISIT (OUTPATIENT)
Dept: SURGERY | Age: 36
End: 2021-04-26
Payer: COMMERCIAL

## 2021-04-26 VITALS
OXYGEN SATURATION: 98 % | RESPIRATION RATE: 16 BRPM | DIASTOLIC BLOOD PRESSURE: 95 MMHG | HEIGHT: 65 IN | TEMPERATURE: 98 F | SYSTOLIC BLOOD PRESSURE: 131 MMHG | BODY MASS INDEX: 40.15 KG/M2 | WEIGHT: 241 LBS

## 2021-04-26 DIAGNOSIS — M79.3 PANNICULITIS: Primary | ICD-10-CM

## 2021-04-26 PROCEDURE — 99204 OFFICE O/P NEW MOD 45 MIN: CPT | Performed by: SURGERY

## 2021-04-26 NOTE — PROGRESS NOTES
[facility]    Consultation requested by: Juan Ramon Boucher    CC: Body contouring    HPI: 37 y.o. female with a PMHx as delineated below who presents in consultation for body contouring. She underwent a sleeve gastrectomy approximately 3 years ago losing a total of 50 lbs. Since that time, she notes that she is having problems with extra skin beneath her abdomen, arms, and thighs. She notes no rashes without prescription creams or antibiotics. She was referred for evaluation. Bariatric surgery: Yes / date:  (Type: sleeve gastrectomy)    Max weight (lbs): 320 (when pregnant); 296 prior to surgery  Lowest weight (lbs): 211  Current (lbs): 241  Weight change in the past 3 months: Yes (from medication)  Max BMI: 50  Current BMI: 40.7    History of DVT/PE: No  Excess skin cover genitals: Yes    Previous body contouring procedures: No  Smoking: No  Pregnancy / Miscarriage: 1/0    Past Medical History:   Diagnosis Date    Abnormal Pap smear of cervix     prior LEEP.  Arthritis     upper cervical area pain    Carpal tunnel syndrome of right wrist 5/10/2017    Gestational diabetes     pre-diabetic--not medically treated    Hypothyroidism     Iron deficiency anemia     Obesity, unspecified     Pancreatitis     PCOS (polycystic ovarian syndrome)     Sleep apnea     requires CPAP while sleeping.      Past Surgical History:   Procedure Laterality Date    CARPAL TUNNEL RELEASE Right 2017    Right carpal tunnel release       SECTION  2016    CHOLECYSTECTOMY, LAPAROSCOPIC  2010    [place] LEEP      [place]    SINUS SURGERY      SLEEVE GASTRECTOMY  2017    laparoscopic - Dr. Eladio Tena Bilateral     WISDOM TOOTH EXTRACTION       Social History     Socioeconomic History    Marital status:      Spouse name: Not on file    Number of children: Not on file    Years of education: Not on file    Highest education level: Not on file   Occupational History    Not on file   Social Needs    Financial resource strain: Not on file    Food insecurity     Worry: Not on file     Inability: Not on file    Transportation needs     Medical: Not on file     Non-medical: Not on file   Tobacco Use    Smoking status: Never Smoker    Smokeless tobacco: Never Used   Substance and Sexual Activity    Alcohol use: Yes     Alcohol/week: 0.0 standard drinks     Comment: 1 drink every 6 months    Drug use: No    Sexual activity: Yes     Partners: Male     Birth control/protection: I.U.D.    Lifestyle    Physical activity     Days per week: Not on file     Minutes per session: Not on file    Stress: Not on file   Relationships    Social connections     Talks on phone: Not on file     Gets together: Not on file     Attends Anabaptism service: Not on file     Active member of club or organization: Not on file     Attends meetings of clubs or organizations: Not on file     Relationship status: Not on file    Intimate partner violence     Fear of current or ex partner: Not on file     Emotionally abused: Not on file     Physically abused: Not on file     Forced sexual activity: Not on file   Other Topics Concern    Not on file   Social History Narrative    Not on file     Family History   Problem Relation Age of Onset    High Blood Pressure Mother     Diabetes Maternal Grandfather     High Blood Pressure Maternal Grandfather     Diabetes Maternal Grandmother     Cancer Maternal Grandmother     Alcohol Abuse Father         alcohol     Cancer Paternal Grandmother        Allergy: No Known Allergies    ROS History obtained from the patient  General ROS: negative  Psychological ROS: negative  Ophthalmic ROS: negative  Neurological ROS: negative  ENT ROS: negative  Allergy and Immunology ROS: negative  Hematological and Lymphatic ROS: negative  Endocrine ROS: negative  Respiratory ROS: negative  Cardiovascular ROS: negative  Gastrointestinal ROS: See HPI  Genito-Urinary ROS: negative  Musculoskeletal ROS: negative   Skin ROS: See HPI. EXAM    BP (!) 131/95   Temp 98 °F (36.7 °C)   Resp 16   Ht 5' 4.5\" (1.638 m)   Wt 241 lb (109.3 kg)   SpO2 98%   BMI 40.73 kg/m²     GEN: NAD, pleasant, obese  CVS: RRR  PULM: No respiratory distress  HEENT: PERRLA/EOMI; dentition (wearing mask), hearing appears within normal limits  NECK: Supple with trachea in midline, no masses  ABD: soft/NT/obese  No palpable hernia, exam limited secondary to habitus  Grade 2 pannus  EXT: No lymphedema noted  NEURO: No focal deficits, no obvious CN deficits    IMP: 37 y.o. female with excess skin after weight loss  PLAN: Would benefit from panniculectomy. However, will need to decrease her weight prior to any intervention. She will work toward this and then follow-up for evaluation. The complexity of body contouring procedures and wound healing physiology were discussed with the patient. She was counseled on ideal medical optimization (nutrition, weight stability, etc.). We also discussed the pros & cons of staging for multiple procedures including controlling tension from various sites and postoperative care managment. Clinical photos were obtained. The risks, benefits, alternatives, outcomes, personnel involved were discussed and all questions were answered in a satisfactory manner according to the patient. Specifically,the risks including, but not limited to: bleeding possibly requiring transfusion orreoperation, infection, seroma, nonhealing of wounds, poor cosmetic outcome, scarring, VTE (DVT/PE), and death were discussed. The patient was counseled at length about the risks of clementina Covid-19 during their perioperative period and any recovery window from their procedure. The patient was made aware that clementina Covid-19  may worsen their prognosis for recovering from their procedure  and lend to a higher morbidity and/or mortality risk.   All material risks, benefits, and reasonable alternatives including postponing the procedure were discussed. The patient does wish to proceed with the procedure at this time.     Kan Betancur MD  [address] [place] [address] Reconstructive Surgery  [phone]  04/26/21

## 2021-04-26 NOTE — Clinical Note
Super nice lady - needs to lose weight prior to any contour procedures. Will see her again when she's lost weight. Thanks!!   Sarah Mcdonnell

## 2021-12-06 ENCOUNTER — OFFICE VISIT (OUTPATIENT)
Dept: ORTHOPEDIC SURGERY | Age: 36
End: 2021-12-06
Payer: COMMERCIAL

## 2021-12-06 VITALS — HEIGHT: 65 IN | BODY MASS INDEX: 40.15 KG/M2 | WEIGHT: 241 LBS

## 2021-12-06 DIAGNOSIS — M25.512 LEFT SHOULDER PAIN, UNSPECIFIED CHRONICITY: Primary | ICD-10-CM

## 2021-12-06 PROCEDURE — 20610 DRAIN/INJ JOINT/BURSA W/O US: CPT | Performed by: ORTHOPAEDIC SURGERY

## 2021-12-06 RX ORDER — METHYLPREDNISOLONE ACETATE 40 MG/ML
40 INJECTION, SUSPENSION INTRA-ARTICULAR; INTRALESIONAL; INTRAMUSCULAR; SOFT TISSUE ONCE
Status: COMPLETED | OUTPATIENT
Start: 2021-12-06 | End: 2021-12-06

## 2021-12-06 RX ORDER — ROPIVACAINE HYDROCHLORIDE 5 MG/ML
30 INJECTION, SOLUTION EPIDURAL; INFILTRATION; PERINEURAL ONCE
Status: COMPLETED | OUTPATIENT
Start: 2021-12-06 | End: 2021-12-06

## 2021-12-06 RX ADMIN — ROPIVACAINE HYDROCHLORIDE 30 ML: 5 INJECTION, SOLUTION EPIDURAL; INFILTRATION; PERINEURAL at 13:01

## 2021-12-06 RX ADMIN — METHYLPREDNISOLONE ACETATE 40 MG: 40 INJECTION, SUSPENSION INTRA-ARTICULAR; INTRALESIONAL; INTRAMUSCULAR; SOFT TISSUE at 13:01

## 2021-12-06 NOTE — PROGRESS NOTES
Administrations This Visit     methylPREDNISolone acetate (DEPO-MEDROL) injection 40 mg     Admin Date  12/06/2021  13:01 Action  Given Dose  40 mg Route  IntraMUSCular Site  Shoulder Left Administered By  Ty Vázquez    Ordering Provider: Richie Conrad MD    NDC: 5264-2920-50    Lot#: [id number]    : [address] [facility].     Patient Supplied?: No          ropivacaine (NAROPIN) 0.5% injection 30 mL     Admin Date  12/06/2021  13:01 Action  Given Dose  30 mL Route  Epidural Site  Shoulder Left Administered By  Ty Vázquez    Ordering Provider: Richie Conrad MD    NDC: 85659-193-80    Lot#: [id number]    : [facility]    Patient Supplied?: No

## 2021-12-06 NOTE — PROGRESS NOTES
ROS: Pertinent items are noted in HPI. No notes on file    Past Medical History:  : Abnormal Pap smear of cervix      Comment:  prior LEEP. No date: Arthritis      Comment:  upper cervical area pain  5/10/2017: Carpal tunnel syndrome of right wrist  No date: Gestational diabetes      Comment:  pre-diabetic--not medically treated  No date: Hypothyroidism  No date: Iron deficiency anemia  No date: Obesity, unspecified  2010: Pancreatitis  No date: PCOS (polycystic ovarian syndrome)  No date: Sleep apnea      Comment:  requires CPAP while sleeping.      Past Surgical History:  2017: CARPAL TUNNEL RELEASE; Right      Comment:  Right carpal tunnel release    2016:  SECTION  2010: CHOLECYSTECTOMY, LAPAROSCOPIC      Comment:  Carlos Zheng  : LEEP      Comment:  Esteban  No date: SINUS SURGERY  2017: SLEEVE GASTRECTOMY      Comment:  laparoscopic - Dr. Jamila Pollard   No date: TONSILLECTOMY; Bilateral  No date: WISDOM TOOTH EXTRACTION    Review of patient's family history indicates:  Problem: High Blood Pressure      Relation: Mother          Age of Onset: (Not Specified)  Problem: Diabetes      Relation: Maternal Grandfather          Age of Onset: (Not Specified)  Problem: High Blood Pressure      Relation: Maternal Grandfather          Age of Onset: (Not Specified)  Problem: Diabetes      Relation: Maternal Grandmother          Age of Onset: (Not Specified)  Problem: Cancer      Relation: Maternal Grandmother          Age of Onset: (Not Specified)  Problem: Alcohol Abuse      Relation: Father          Age of Onset: (Not Specified)          Comment: alcohol   Problem: Cancer      Relation: Paternal Grandmother          Age of Onset: (Not Specified)      Social History    Socioeconomic History      Marital status:       Spouse name: None      Number of children: None      Years of education: None      Highest education level: None    Occupational History      None    Tobacco Use      Smoking status: Never Smoker      Smokeless tobacco: Never Used    Substance and Sexual Activity      Alcohol use: Yes        Alcohol/week: 0.0 standard drinks        Comment: 1 drink every 6 months      Drug use: No      Sexual activity: Yes        Partners: Male        Birth control/protection: I.U.D. Other Topics      Concerns:        None    Social History Narrative      None    Social Determinants of Health  Financial Resource Strain:       Difficulty of Paying Living Expenses: Not on file  Food Insecurity:       Worried About Running Out of Food in the Last Year: Not on file      Ran Out of Food in the Last Year: Not on file  Transportation Needs:       Lack of Transportation (Medical): Not on file      Lack of Transportation (Non-Medical):  Not on file  Physical Activity:       Days of Exercise per Week: Not on file      Minutes of Exercise per Session: Not on file  Stress:       Feeling of Stress : Not on file  Social Connections:       Frequency of Communication with Friends and Family: Not on file      Frequency of Social Gatherings with Friends and Family: Not on file      Attends Congregation Services: Not on file      Active Member of [address] [place] [address] or Organizations: Not on file      Attends Club or Organization Meetings: Not on file      Marital Status: Not on file  Intimate Partner Violence:       Fear of Current or Ex-Partner: Not on file      Emotionally Abused: Not on file      Physically Abused: Not on file      Sexually Abused: Not on file  Housing Stability:       Unable to Pay for Housing in the Last Year: Not on file      Number of Places Lived in the Last Year: Not on file      Unstable Housing in the Last Year: Not on file    Current Outpatient Medications:  levothyroxine (SYNTHROID) 25 MCG tablet, Take 1 tablet by mouth once daily, Disp: 90 tablet, Rfl: 3  desvenlafaxine succinate (PRISTIQ) 25 MG TB24 extended release tablet, Take 1 tablet by mouth daily, Disp: 30 tablet, Rfl: 0  desvenlafaxine succinate Grandmother          Age of Onset: (Not Specified)  Problem: Cancer      Relation: Maternal Grandmother          Age of Onset: (Not Specified)  Problem: Alcohol Abuse      Relation: Father          Age of Onset: (Not Specified)          Comment: alcohol   Problem: Cancer      Relation: Paternal Grandmother          Age of Onset: (Not Specified)      Social History    Socioeconomic History      Marital status:       Spouse name: None      Number of children: None      Years of education: None      Highest education level: None    Occupational History      None    Tobacco Use      Smoking status: Never Smoker      Smokeless tobacco: Never Used    Substance and Sexual Activity      Alcohol use: Yes        Alcohol/week: 0.0 standard drinks        Comment: 1 drink every 6 months      Drug use: No      Sexual activity: Yes        Partners: Male        Birth control/protection: I.U.D. Other Topics      Concerns:        None    Social History Narrative      None    Social Determinants of Health  Financial Resource Strain:       Difficulty of Paying Living Expenses: Not on file  Food Insecurity:       Worried About Running Out of Food in the Last Year: Not on file      Ran Out of Food in the Last Year: Not on file  Transportation Needs:       Lack of Transportation (Medical): Not on file      Lack of Transportation (Non-Medical):  Not on file  Physical Activity:       Days of Exercise per Week: Not on file      Minutes of Exercise per Session: Not on file  Stress:       Feeling of Stress : Not on file  Social Connections:       Frequency of Communication with Friends and Family: Not on file      Frequency of Social Gatherings with Friends and Family: Not on file      Attends Yarsani Services: Not on file      Active Member of Clubs or Organizations: Not on file      Attends Club or Organization Meetings: Not on file      Marital Status: Not on file  Intimate Partner Violence:       Fear of Current or Ex-Partner: Not on file      Emotionally Abused: Not on file      Physically Abused: Not on file      Sexually Abused: Not on file  Housing Stability:       Unable to Pay for Housing in the Last Year: Not on file      Number of Places Lived in the Last Year: Not on file      Unstable Housing in the Last Year: Not on file    Current Outpatient Medications:  levothyroxine (SYNTHROID) 25 MCG tablet, Take 1 tablet by mouth once daily, Disp: 90 tablet, Rfl: 3  desvenlafaxine succinate (PRISTIQ) 25 MG TB24 extended release tablet, Take 1 tablet by mouth daily, Disp: 30 tablet, Rfl: 0  desvenlafaxine succinate (PRISTIQ) 50 MG TB24 extended release tablet, Take 1 tablet by mouth daily, Disp: 30 tablet, Rfl: 1  omeprazole (PRILOSEC) 20 MG delayed release capsule, Take 1 capsule by mouth once daily, Disp: 30 capsule, Rfl: 3  acetaminophen (TYLENOL) 325 MG tablet, Take 650 mg by mouth as needed for Pain, Disp: , Rfl:   buPROPion (WELLBUTRIN XL) 300 MG extended release tablet, Take 1 tablet by mouth every morning, Disp: 90 tablet, Rfl: 1    No current facility-administered medications for this visit.       No Known Allergies    VITAL SIGNS:  Ht 5' 4.5\" (1.638 m)   Wt 241 lb (109.3 kg)   BMI 40.73 kg/m²

## 2021-12-06 NOTE — PROGRESS NOTES
The patient returns today for her left shoulder. We use the sling this is a little over a year ago. 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 She points to the top of the left shoulder. There have been no other traumatic incident. ROS: Pertinent items are noted in HPI.    ROS: Pertinent items are noted in HPI. No notes on file    Past Medical History:  : Abnormal Pap smear of cervix      Comment:  prior LEEP. No date: Arthritis      Comment:  upper cervical area pain  5/10/2017: Carpal tunnel syndrome of right wrist  No date: Gestational diabetes      Comment:  pre-diabetic--not medically treated  No date: Hypothyroidism  No date: Iron deficiency anemia  No date: Obesity, unspecified  : Pancreatitis  No date: PCOS (polycystic ovarian syndrome)  No date: Sleep apnea      Comment:  requires CPAP while sleeping.      Past Surgical History:  2017: CARPAL TUNNEL RELEASE; Right      Comment:  Right carpal tunnel release    2016:  SECTION  2010: CHOLECYSTECTOMY, LAPAROSCOPIC      Comment:  Eric Starla  : LEEP      Comment:  Esteban  No date: SINUS SURGERY  2017: SLEEVE GASTRECTOMY      Comment:  laparoscopic - Dr. Ami Palafox   No date: TONSILLECTOMY; Bilateral  No date: WISDOM TOOTH EXTRACTION    Review of patient's family history indicates:  Problem: High Blood Pressure      Relation: Mother          Age of Onset: (Not Specified)  Problem: Diabetes      Relation: Maternal Grandfather          Age of Onset: (Not Specified)  Problem: High Blood Pressure      Relation: Maternal Grandfather          Age of Onset: (Not Specified)  Problem: Diabetes      Relation: Maternal Grandmother          Age of Onset: (Not Specified)  Problem: Cancer      Relation: Maternal Grandmother          Age of Onset: (Not Specified)  Problem: Alcohol Abuse      Relation: Father          Age of Onset: (Not Specified) Comment: alcohol   Problem: Cancer      Relation: Paternal Grandmother          Age of Onset: (Not Specified)      Social History    Socioeconomic History      Marital status:       Spouse name: None      Number of children: None      Years of education: None      Highest education level: None    Occupational History      None    Tobacco Use      Smoking status: Never Smoker      Smokeless tobacco: Never Used    Substance and Sexual Activity      Alcohol use: Yes        Alcohol/week: 0.0 standard drinks        Comment: 1 drink every 6 months      Drug use: No      Sexual activity: Yes        Partners: Male        Birth control/protection: I.U.D. Other Topics      Concerns:        None    Social History Narrative      None    Social Determinants of Health  Financial Resource Strain:       Difficulty of Paying Living Expenses: Not on file  Food Insecurity:       Worried About Running Out of Food in the Last Year: Not on file      Ran Out of Food in the Last Year: Not on file  Transportation Needs:       Lack of Transportation (Medical): Not on file      Lack of Transportation (Non-Medical):  Not on file  Physical Activity:       Days of Exercise per Week: Not on file      Minutes of Exercise per Session: Not on file  Stress:       Feeling of Stress : Not on file  Social Connections:       Frequency of Communication with Friends and Family: Not on file      Frequency of Social Gatherings with Friends and Family: Not on file      Attends [employer] Services: Not on file      Active Member of [address] [place] [address] or Organizations: Not on file      Attends Club or Organization Meetings: Not on file      Marital Status: Not on file  Intimate Partner Violence:       Fear of Current or Ex-Partner: Not on file      Emotionally Abused: Not on file      Physically Abused: Not on file      Sexually Abused: Not on file  Housing Stability:       Unable to Pay for Housing in the Last Year: Not on file      Number of Places Lived in the Last Year: Not on file      Unstable Housing in the Last Year: Not on file    Current Outpatient Medications:  levothyroxine (SYNTHROID) 25 MCG tablet, Take 1 tablet by mouth once   daily, Disp: 90 tablet, Rfl: 3  desvenlafaxine succinate (PRISTIQ) 25 MG TB24 extended release tablet,   Take 1 tablet by mouth daily, Disp: 30 tablet, Rfl: 0  desvenlafaxine succinate (PRISTIQ) 50 MG TB24 extended release tablet,   Take 1 tablet by mouth daily, Disp: 30 tablet, Rfl: 1  omeprazole (PRILOSEC) 20 MG delayed release capsule, Take 1 capsule by   mouth once daily, Disp: 30 capsule, Rfl: 3  acetaminophen (TYLENOL) 325 MG tablet, Take 650 mg by mouth as needed for   Pain, Disp: , Rfl:   buPROPion (WELLBUTRIN XL) 300 MG extended release tablet, Take 1 tablet by   mouth every morning, Disp: 90 tablet, Rfl: 1    No current facility-administered medications for this visit. No Known Allergies    VITAL SIGNS:  Ht 5' 4.5\" (1.638 m)   Wt 241 lb (109.3 kg)   BMI 40.73   kg/m²   ROS: Pertinent items are noted in HPI. No notes on file    Past Medical History:  : Abnormal Pap smear of cervix      Comment:  prior LEEP. No date: Arthritis      Comment:  upper cervical area pain  5/10/2017: Carpal tunnel syndrome of right wrist  No date: Gestational diabetes      Comment:  pre-diabetic--not medically treated  No date: Hypothyroidism  No date: Iron deficiency anemia  No date: Obesity, unspecified  : Pancreatitis  No date: PCOS (polycystic ovarian syndrome)  No date: Sleep apnea      Comment:  requires CPAP while sleeping.      Past Surgical History:  2017: CARPAL TUNNEL RELEASE; Right      Comment:  Right carpal tunnel release    2016:  SECTION  2010: CHOLECYSTECTOMY, LAPAROSCOPIC      Comment:  Carlos Zheng  : LEEP      Comment:  Esteban  No date: SINUS SURGERY  2017: SLEEVE GASTRECTOMY      Comment:  laparoscopic - Dr. Jamila Pollard   No date: TONSILLECTOMY; Bilateral  No date: WISDOM TOOTH EXTRACTION    Review of patient's family history indicates:  Problem: High Blood Pressure      Relation: Mother          Age of Onset: (Not Specified)  Problem: Diabetes      Relation: Maternal Grandfather          Age of Onset: (Not Specified)  Problem: High Blood Pressure      Relation: Maternal Grandfather          Age of Onset: (Not Specified)  Problem: Diabetes      Relation: Maternal Grandmother          Age of Onset: (Not Specified)  Problem: Cancer      Relation: Maternal Grandmother          Age of Onset: (Not Specified)  Problem: Alcohol Abuse      Relation: Father          Age of Onset: (Not Specified)          Comment: alcohol   Problem: Cancer      Relation: Paternal Grandmother          Age of Onset: (Not Specified)      Social History    Socioeconomic History      Marital status:       Spouse name: None      Number of children: None      Years of education: None      Highest education level: None    Occupational History      None    Tobacco Use      Smoking status: Never Smoker      Smokeless tobacco: Never Used    Substance and Sexual Activity      Alcohol use: Yes        Alcohol/week: 0.0 standard drinks        Comment: 1 drink every 6 months      Drug use: No      Sexual activity: Yes        Partners: Male        Birth control/protection: I.U.D. Other Topics      Concerns:        None    Social History Narrative      None    Social Determinants of Health  Financial Resource Strain:       Difficulty of Paying Living Expenses: Not on file  Food Insecurity:       Worried About Running Out of Food in the Last Year: Not on file      Ran Out of Food in the Last Year: Not on file  Transportation Needs:       Lack of Transportation (Medical): Not on file      Lack of Transportation (Non-Medical):  Not on file  Physical Activity:       Days of Exercise per Week: Not on file      Minutes of Exercise per Session: Not on file  Stress:       Feeling of Stress : Not on file  Social Connections:       Frequency of Communication with Friends and Family: Not on file      Frequency of Social Gatherings with Friends and Family: Not on file      Attends [employer] Services: Not on file      Active Member of Clubs or Organizations: Not on file      Attends Club or Organization Meetings: Not on file      Marital Status: Not on file  Intimate Partner Violence:       Fear of Current or Ex-Partner: Not on file      Emotionally Abused: Not on file      Physically Abused: Not on file      Sexually Abused: Not on file  Housing Stability:       Unable to Pay for Housing in the Last Year: Not on file      Number of Places Lived in the Last Year: Not on file      Unstable Housing in the Last Year: Not on file    Current Outpatient Medications:  levothyroxine (SYNTHROID) 25 MCG tablet, Take 1 tablet by mouth once   daily, Disp: 90 tablet, Rfl: 3  desvenlafaxine succinate (PRISTIQ) 25 MG TB24 extended release tablet,   Take 1 tablet by mouth daily, Disp: 30 tablet, Rfl: 0  desvenlafaxine succinate (PRISTIQ) 50 MG TB24 extended release tablet,   Take 1 tablet by mouth daily, Disp: 30 tablet, Rfl: 1  omeprazole (PRILOSEC) 20 MG delayed release capsule, Take 1 capsule by   mouth once daily, Disp: 30 capsule, Rfl: 3  acetaminophen (TYLENOL) 325 MG tablet, Take 650 mg by mouth as needed for   Pain, Disp: , Rfl:   buPROPion (WELLBUTRIN XL) 300 MG extended release tablet, Take 1 tablet by   mouth every morning, Disp: 90 tablet, Rfl: 1    No current facility-administered medications for this visit. No Known Allergies    VITAL SIGNS:  Ht 5' 4.5\" (1.638 m)   Wt 241 lb (109.3 kg)   BMI 40.73   kg/m²           Past Medical History:  2014: Abnormal Pap smear of cervix      Comment:  prior LEEP.   No date: Arthritis      Comment:  upper cervical area pain  5/10/2017: Carpal tunnel syndrome of right wrist  No date: Gestational diabetes      Comment:  pre-diabetic--not medically treated  No date: Hypothyroidism  No date: Iron deficiency anemia  No date: Obesity, unspecified  2010: Pancreatitis  No date: PCOS (polycystic ovarian syndrome)  No date: Sleep apnea      Comment:  requires CPAP while sleeping. Past Surgical History:  2017: CARPAL TUNNEL RELEASE; Right      Comment:  Right carpal tunnel release    2016:  SECTION  2010: CHOLECYSTECTOMY, LAPAROSCOPIC      Comment:  Colton Horta  : ERASMO      Comment:  Esteban  No date: SINUS SURGERY  2017: SLEEVE GASTRECTOMY      Comment:  laparoscopic - Dr. Lillian Rivers   No date: TONSILLECTOMY; Bilateral  No date: WISDOM TOOTH EXTRACTION    Review of patient's family history indicates:  Problem: High Blood Pressure      Relation: Mother          Age of Onset: (Not Specified)  Problem: Diabetes      Relation: Maternal Grandfather          Age of Onset: (Not Specified)  Problem: High Blood Pressure      Relation: Maternal Grandfather          Age of Onset: (Not Specified)  Problem: Diabetes      Relation: Maternal Grandmother          Age of Onset: (Not Specified)  Problem: Cancer      Relation: Maternal Grandmother          Age of Onset: (Not Specified)  Problem: Alcohol Abuse      Relation: Father          Age of Onset: (Not Specified)          Comment: alcohol   Problem: Cancer      Relation: Paternal Grandmother          Age of Onset: (Not Specified)      Social History    Socioeconomic History      Marital status:       Spouse name: None      Number of children: None      Years of education: None      Highest education level: None    Occupational History      None    Tobacco Use      Smoking status: Never Smoker      Smokeless tobacco: Never Used    Substance and Sexual Activity      Alcohol use: Yes        Alcohol/week: 0.0 standard drinks        Comment: 1 drink every 6 months      Drug use: No      Sexual activity: Yes        Partners: Male        Birth control/protection: I.U.D.     Other Topics      Concerns:        None    Social History Narrative      None    Social Determinants of Health  Financial Resource Strain:       Difficulty of Paying Living Expenses: Not on file  Food Insecurity:       Worried About Running Out of Food in the Last Year: Not on file      Ran Out of Food in the Last Year: Not on file  Transportation Needs:       Lack of Transportation (Medical): Not on file      Lack of Transportation (Non-Medical):  Not on file  Physical Activity:       Days of Exercise per Week: Not on file      Minutes of Exercise per Session: Not on file  Stress:       Feeling of Stress : Not on file  Social Connections:       Frequency of Communication with Friends and Family: Not on file      Frequency of Social Gatherings with Friends and Family: Not on file      Attends [employer] Services: Not on file      Active Member of [address] [place] [address] or Organizations: Not on file      Attends Club or Organization Meetings: Not on file      Marital Status: Not on file  Intimate Partner Violence:       Fear of Current or Ex-Partner: Not on file      Emotionally Abused: Not on file      Physically Abused: Not on file      Sexually Abused: Not on file  Housing Stability:       Unable to Pay for Housing in the Last Year: Not on file      Number of Places Lived in the Last Year: Not on file      Unstable Housing in the Last Year: Not on file    Current Outpatient Medications:  levothyroxine (SYNTHROID) 25 MCG tablet, Take 1 tablet by mouth once daily, Disp: 90 tablet, Rfl: 3  desvenlafaxine succinate (PRISTIQ) 25 MG TB24 extended release tablet, Take 1 tablet by mouth daily, Disp: 30 tablet, Rfl: 0  desvenlafaxine succinate (PRISTIQ) 50 MG TB24 extended release tablet, Take 1 tablet by mouth daily, Disp: 30 tablet, Rfl: 1  omeprazole (PRILOSEC) 20 MG delayed release capsule, Take 1 capsule by mouth once daily, Disp: 30 capsule, Rfl: 3  acetaminophen (TYLENOL) 325 MG tablet, Take 650 mg by mouth as needed for Pain, Disp: , Rfl:   buPROPion (WELLBUTRIN XL) 300 MG extended release tablet, Take 1 tablet by mouth every morning, Disp: 90 tablet, Rfl: 1    No current facility-administered medications for this visit. No Known Allergies    VITAL SIGNS:  Ht 5' 4.5\" (1.638 m)   Wt 241 lb (109.3 kg)   BMI 40.73 kg/m²   On examination today she had 180 degrees of active flexion and abduction with just a little feeling of tightness. She has some tenderness AC joint and feels tight with a crossarm test. She has some anterior cuff tenderness and mild to moderately positive impingement signs. There is no supraspinatus or infraspinatus atrophy. She has negative belly press test. She has excellent external rotation strength. 3 views of the left shoulder obtained today. This shows a little superior osteophyte of distal clavicle but a well centered humeral head with normal joint space no marginal osteophytes no intraosseous lesions. Impression: Left shoulder AC arthrosis and impingement. Plan: We discussed the option she would like to try another injections this last one lasted about a year and a half. After sterile prep injected left shoulder with 80 mg of Depo-Medrol and 15 mg of ropivacaine split between [facility] joint and subacromial space. The patient tolerated this procedure well.

## 2022-03-29 DIAGNOSIS — E78.2 HYPERLIPIDEMIA, MIXED: Primary | ICD-10-CM

## 2022-03-29 DIAGNOSIS — E03.9 ACQUIRED HYPOTHYROIDISM: ICD-10-CM

## 2022-03-29 DIAGNOSIS — K21.9 CHRONIC GERD: ICD-10-CM

## 2022-03-29 DIAGNOSIS — R73.03 PREDIABETES: ICD-10-CM

## 2022-03-29 DIAGNOSIS — Z00.00 ANNUAL PHYSICAL EXAM: ICD-10-CM

## 2022-03-29 RX ORDER — OMEPRAZOLE 20 MG/1
CAPSULE, DELAYED RELEASE ORAL
Qty: 90 CAPSULE | Refills: 0 | Status: SHIPPED | OUTPATIENT
Start: 2022-03-29 | End: 2022-10-11 | Stop reason: SDUPTHER

## 2022-03-29 RX ORDER — LEVOTHYROXINE SODIUM 0.03 MG/1
TABLET ORAL
Qty: 90 TABLET | Refills: 0 | OUTPATIENT
Start: 2022-03-29

## 2022-03-29 RX ORDER — LEVOTHYROXINE SODIUM 0.03 MG/1
TABLET ORAL
Qty: 30 TABLET | Refills: 0 | Status: SHIPPED | OUTPATIENT
Start: 2022-03-29 | End: 2022-04-11 | Stop reason: SDUPTHER

## 2022-03-29 NOTE — TELEPHONE ENCOUNTER
Medication:   Requested Prescriptions     Pending Prescriptions Disp Refills    levothyroxine (SYNTHROID) 25 MCG tablet [Pharmacy Med Name: L-THYROXINE (SYNTHROID) TABS 25MCG] 90 tablet 3     Sig: TAKE 1 TABLET DAILY       Last Filled:  03/29/2021 #90 [id number]    Patient Phone Number: [phone] (home)     Last appt: 3/17/2021   Next appt: Visit date not found    Last Thyroid:   Lab Results   Component Value Date    TSH 2.16 06/09/2020    FT3 2.9 12/03/2012    T4FREE 1.1 06/09/2020    P5RUMIG 7.1 02/22/2017

## 2022-03-31 NOTE — TELEPHONE ENCOUNTER
Orders placed    Lab hours: Monday- Friday 7:30 am-3:30 pm  No appointment necessary, first come first serve. Sign in at . Nothing but water for 10 hours for fasting labs. Increase water 24 hours before lab draw.   Ok to close once aware

## 2022-04-08 DIAGNOSIS — Z00.00 ANNUAL PHYSICAL EXAM: ICD-10-CM

## 2022-04-08 DIAGNOSIS — E03.9 ACQUIRED HYPOTHYROIDISM: ICD-10-CM

## 2022-04-08 DIAGNOSIS — R73.03 PREDIABETES: ICD-10-CM

## 2022-04-08 DIAGNOSIS — E78.2 HYPERLIPIDEMIA, MIXED: ICD-10-CM

## 2022-04-08 LAB
A/G RATIO: 1.7 (ref 1.1–2.2)
ALBUMIN SERPL-MCNC: 4.3 G/DL (ref 3.4–5)
ALP BLD-CCNC: 36 U/L (ref 40–129)
ALT SERPL-CCNC: 27 U/L (ref 10–40)
ANION GAP SERPL CALCULATED.3IONS-SCNC: 16 MMOL/L (ref 3–16)
AST SERPL-CCNC: 19 U/L (ref 15–37)
BASOPHILS ABSOLUTE: 0.1 K/UL (ref 0–0.2)
BASOPHILS RELATIVE PERCENT: 1 %
BILIRUB SERPL-MCNC: 0.8 MG/DL (ref 0–1)
BUN BLDV-MCNC: 10 MG/DL (ref 7–20)
CALCIUM SERPL-MCNC: 9.2 MG/DL (ref 8.3–10.6)
CHLORIDE BLD-SCNC: 101 MMOL/L (ref 99–110)
CHOLESTEROL, TOTAL: 192 MG/DL (ref 0–199)
CO2: 23 MMOL/L (ref 21–32)
CREAT SERPL-MCNC: 0.7 MG/DL (ref 0.6–1.1)
EOSINOPHILS ABSOLUTE: 0.1 K/UL (ref 0–0.6)
EOSINOPHILS RELATIVE PERCENT: 1.6 %
GFR AFRICAN AMERICAN: >60
GFR NON-AFRICAN AMERICAN: >60
GLUCOSE BLD-MCNC: 83 MG/DL (ref 70–99)
HCT VFR BLD CALC: 37.3 % (ref 36–48)
HDLC SERPL-MCNC: 56 MG/DL (ref 40–60)
HEMOGLOBIN: 12.6 G/DL (ref 12–16)
LDL CHOLESTEROL CALCULATED: 122 MG/DL
LYMPHOCYTES ABSOLUTE: 1.7 K/UL (ref 1–5.1)
LYMPHOCYTES RELATIVE PERCENT: 30.4 %
MCH RBC QN AUTO: 29.6 PG (ref 26–34)
MCHC RBC AUTO-ENTMCNC: 33.7 G/DL (ref 31–36)
MCV RBC AUTO: 87.8 FL (ref 80–100)
MONOCYTES ABSOLUTE: 0.3 K/UL (ref 0–1.3)
MONOCYTES RELATIVE PERCENT: 5.2 %
NEUTROPHILS ABSOLUTE: 3.5 K/UL (ref 1.7–7.7)
NEUTROPHILS RELATIVE PERCENT: 61.8 %
PDW BLD-RTO: 12.8 % (ref 12.4–15.4)
PLATELET # BLD: 325 K/UL (ref 135–450)
PMV BLD AUTO: 8.2 FL (ref 5–10.5)
POTASSIUM SERPL-SCNC: 4.5 MMOL/L (ref 3.5–5.1)
RBC # BLD: 4.25 M/UL (ref 4–5.2)
SODIUM BLD-SCNC: 140 MMOL/L (ref 136–145)
T4 TOTAL: 7.7 UG/DL (ref 4.5–10.9)
TOTAL PROTEIN: 6.9 G/DL (ref 6.4–8.2)
TRIGL SERPL-MCNC: 71 MG/DL (ref 0–150)
TSH REFLEX: 2.06 UIU/ML (ref 0.27–4.2)
VLDLC SERPL CALC-MCNC: 14 MG/DL
WBC # BLD: 5.7 K/UL (ref 4–11)

## 2022-04-09 LAB
ESTIMATED AVERAGE GLUCOSE: 114 MG/DL
HBA1C MFR BLD: 5.6 %

## 2022-04-11 ENCOUNTER — OFFICE VISIT (OUTPATIENT)
Dept: FAMILY MEDICINE CLINIC | Age: 37
End: 2022-04-11
Payer: COMMERCIAL

## 2022-04-11 VITALS
OXYGEN SATURATION: 99 % | BODY MASS INDEX: 38.16 KG/M2 | HEART RATE: 92 BPM | WEIGHT: 225.8 LBS | DIASTOLIC BLOOD PRESSURE: 84 MMHG | SYSTOLIC BLOOD PRESSURE: 120 MMHG

## 2022-04-11 DIAGNOSIS — F90.0 ATTENTION DEFICIT HYPERACTIVITY DISORDER (ADHD), PREDOMINANTLY INATTENTIVE TYPE: ICD-10-CM

## 2022-04-11 DIAGNOSIS — B96.89 ACUTE BACTERIAL SINUSITIS: ICD-10-CM

## 2022-04-11 DIAGNOSIS — J01.90 ACUTE BACTERIAL SINUSITIS: ICD-10-CM

## 2022-04-11 DIAGNOSIS — E03.9 ACQUIRED HYPOTHYROIDISM: ICD-10-CM

## 2022-04-11 DIAGNOSIS — E66.01 SEVERE OBESITY (BMI 35.0-39.9) WITH COMORBIDITY (HCC): ICD-10-CM

## 2022-04-11 DIAGNOSIS — G47.33 SLEEP APNEA, OBSTRUCTIVE: ICD-10-CM

## 2022-04-11 DIAGNOSIS — F41.8 DEPRESSION WITH ANXIETY: ICD-10-CM

## 2022-04-11 DIAGNOSIS — Z00.00 WELL ADULT EXAM: Primary | ICD-10-CM

## 2022-04-11 PROBLEM — E66.9 OBESITY (BMI 30-39.9): Status: RESOLVED | Noted: 2018-02-19 | Resolved: 2022-04-11

## 2022-04-11 PROBLEM — G56.01 CARPAL TUNNEL SYNDROME OF RIGHT WRIST: Status: RESOLVED | Noted: 2017-05-10 | Resolved: 2022-04-11

## 2022-04-11 PROCEDURE — 99395 PREV VISIT EST AGE 18-39: CPT | Performed by: NURSE PRACTITIONER

## 2022-04-11 RX ORDER — DESVENLAFAXINE 25 MG/1
25 TABLET, EXTENDED RELEASE ORAL DAILY
Qty: 90 TABLET | Refills: 3 | Status: CANCELLED | OUTPATIENT
Start: 2022-04-11 | End: 2023-04-11

## 2022-04-11 RX ORDER — LEVOTHYROXINE SODIUM 0.03 MG/1
25 TABLET ORAL DAILY
Qty: 90 TABLET | Refills: 3 | Status: SHIPPED | OUTPATIENT
Start: 2022-04-11 | End: 2023-04-11

## 2022-04-11 RX ORDER — DESVENLAFAXINE 50 MG/1
50 TABLET, EXTENDED RELEASE ORAL DAILY
Qty: 90 TABLET | Refills: 3 | Status: CANCELLED | OUTPATIENT
Start: 2022-04-11 | End: 2023-04-11

## 2022-04-11 RX ORDER — CEPHALEXIN 500 MG/1
500 CAPSULE ORAL 2 TIMES DAILY
Qty: 20 CAPSULE | Refills: 0 | Status: SHIPPED | OUTPATIENT
Start: 2022-04-11 | End: 2022-04-21

## 2022-04-11 RX ORDER — DEXTROAMPHETAMINE SACCHARATE, AMPHETAMINE ASPARTATE, DEXTROAMPHETAMINE SULFATE AND AMPHETAMINE SULFATE 5; 5; 5; 5 MG/1; MG/1; MG/1; MG/1
TABLET ORAL
COMMUNITY
Start: 2022-03-16

## 2022-04-11 RX ORDER — BUSPIRONE HYDROCHLORIDE 30 MG/1
TABLET ORAL
COMMUNITY
Start: 2022-03-16

## 2022-04-11 RX ORDER — BUPROPION HYDROCHLORIDE 300 MG/1
300 TABLET ORAL EVERY MORNING
Qty: 90 TABLET | Refills: 3 | Status: CANCELLED | OUTPATIENT
Start: 2022-04-11 | End: 2023-04-11

## 2022-04-11 SDOH — ECONOMIC STABILITY: FOOD INSECURITY: WITHIN THE PAST 12 MONTHS, THE FOOD YOU BOUGHT JUST DIDN'T LAST AND YOU DIDN'T HAVE MONEY TO GET MORE.: NEVER TRUE

## 2022-04-11 SDOH — ECONOMIC STABILITY: FOOD INSECURITY: WITHIN THE PAST 12 MONTHS, YOU WORRIED THAT YOUR FOOD WOULD RUN OUT BEFORE YOU GOT MONEY TO BUY MORE.: NEVER TRUE

## 2022-04-11 ASSESSMENT — PATIENT HEALTH QUESTIONNAIRE - PHQ9
5. POOR APPETITE OR OVEREATING: 0
SUM OF ALL RESPONSES TO PHQ9 QUESTIONS 1 & 2: 0
SUM OF ALL RESPONSES TO PHQ QUESTIONS 1-9: 0
SUM OF ALL RESPONSES TO PHQ QUESTIONS 1-9: 0
1. LITTLE INTEREST OR PLEASURE IN DOING THINGS: 0
10. IF YOU CHECKED OFF ANY PROBLEMS, HOW DIFFICULT HAVE THESE PROBLEMS MADE IT FOR YOU TO DO YOUR WORK, TAKE CARE OF THINGS AT HOME, OR GET ALONG WITH OTHER PEOPLE: 0
2. FEELING DOWN, DEPRESSED OR HOPELESS: 0
8. MOVING OR SPEAKING SO SLOWLY THAT OTHER PEOPLE COULD HAVE NOTICED. OR THE OPPOSITE, BEING SO FIGETY OR RESTLESS THAT YOU HAVE BEEN MOVING AROUND A LOT MORE THAN USUAL: 0
9. THOUGHTS THAT YOU WOULD BE BETTER OFF DEAD, OR OF HURTING YOURSELF: 0
SUM OF ALL RESPONSES TO PHQ QUESTIONS 1-9: 0
3. TROUBLE FALLING OR STAYING ASLEEP: 0
6. FEELING BAD ABOUT YOURSELF - OR THAT YOU ARE A FAILURE OR HAVE LET YOURSELF OR YOUR FAMILY DOWN: 0
SUM OF ALL RESPONSES TO PHQ QUESTIONS 1-9: 0
7. TROUBLE CONCENTRATING ON THINGS, SUCH AS READING THE NEWSPAPER OR WATCHING TELEVISION: 0
4. FEELING TIRED OR HAVING LITTLE ENERGY: 0

## 2022-04-11 ASSESSMENT — SOCIAL DETERMINANTS OF HEALTH (SDOH): HOW HARD IS IT FOR YOU TO PAY FOR THE VERY BASICS LIKE FOOD, HOUSING, MEDICAL CARE, AND HEATING?: HARD

## 2022-04-11 NOTE — PATIENT INSTRUCTIONS
Patient Education        Well Visit, Ages 25 to 48: Care Instructions  Overview     Well visits can help you stay healthy. Your doctor has checked your overall health and may have suggested ways to take good care of yourself. Your doctor also may have recommended tests. At home, you can help prevent illness withhealthy eating, regular exercise, and other steps. Follow-up care is a key part of your treatment and safety. Be sure to make and go to all appointments, and call your doctor if you are having problems. It's also a good idea to know your test results and keep alist of the medicines you take. How can you care for yourself at home?  Get screening tests that you and your doctor decide on. Screening helps find diseases before any symptoms appear.  Eat healthy foods. Choose fruits, vegetables, whole grains, protein, and low-fat dairy foods. Limit fat, especially saturated fat. Reduce salt in your diet.  Limit alcohol. If you are a man, have no more than 2 drinks a day or 14 drinks a week. If you are a woman, have no more than 1 drink a day or 7 drinks a week.  Get at least 30 minutes of physical activity on most days of the week. Walking is a good choice. You also may want to do other activities, such as running, swimming, cycling, or playing tennis or team sports. Discuss any changes in your exercise program with your doctor.  Reach and stay at a healthy weight. This will lower your risk for many problems, such as obesity, diabetes, heart disease, and high blood pressure.  Do not smoke or allow others to smoke around you. If you need help quitting, talk to your doctor about stop-smoking programs and medicines. These can increase your chances of quitting for good.  Care for your mental health. It is easy to get weighed down by worry and stress. Learn strategies to manage stress, like deep breathing and mindfulness, and stay connected with your family and community.  If you find you often feel sad or hopeless, talk with your doctor. Treatment can help.  Talk to your doctor about whether you have any risk factors for sexually transmitted infections (STIs). You can help prevent STIs if you wait to have sex with a new partner (or partners) until you've each been tested for STIs. It also helps if you use condoms (male or female condoms) and if you limit your sex partners to one person who only has sex with you. Vaccines are available for some STIs, such as HPV.  Use birth control if it's important to you to prevent pregnancy. Talk with your doctor about the choices available and what might be best for you.  If you think you may have a problem with alcohol or drug use, talk to your doctor. This includes prescription medicines (such as amphetamines and opioids) and illegal drugs (such as cocaine and methamphetamine). Your doctor can help you figure out what type of treatment is best for you.  Protect your skin from too much sun. When you're outdoors from 10 a.m. to 4 p.m., stay in the shade or cover up with clothing and a hat with a wide brim. Wear sunglasses that block UV rays. Even when it's cloudy, put broad-spectrum sunscreen (SPF 30 or higher) on any exposed skin.  See a dentist one or two times a year for checkups and to have your teeth cleaned.  Wear a seat belt in the car. When should you call for help? Watch closely for changes in your health, and be sure to contact your doctor if you have any problems or symptoms that concern you. Where can you learn more? Go to https://[employer]. org and sign in to your [employer] account. Enter P072 in the [facility] box to learn more about \"Well Visit, Ages 25 to 48: Care Instructions. \"     If you do not have an account, please click on the \"Sign Up Now\" link. Current as of: October 6, 2021               Content Version: 13.2  © 3620-4729 [employer]wise, [employer]. Care instructions adapted under license by [facility] [address] If you have questions about a medical condition or this instruction, always ask your healthcare professional. [place] [address] any warranty or liability for your use of this information.

## 2022-04-11 NOTE — ASSESSMENT & PLAN NOTE
Improving   Strongly recommend eliminating concentrated sweets, reducing simple carbs. Avoid corn syrup and hydrogenated oils. Focus on fruits, vegs, whole grains, lean meats and push water. Fish oil, high fiber foods recommended. Recommended at least 30 minutes of aerobic exercise daily.

## 2022-04-11 NOTE — PROGRESS NOTES
requires CPAP while sleeping. Past Surgical History:   Procedure Laterality Date    CARPAL TUNNEL RELEASE Right 2017    Right carpal tunnel release       SECTION  2016    CHOLECYSTECTOMY, LAPAROSCOPIC  2010    Janee ZIMMERMAN      [place]    SINUS SURGERY      SLEEVE GASTRECTOMY  2017    laparoscopic - Dr. To Palacios Bilateral     [address] EXTRACTION         Outpatient Medications Marked as Taking for the 22 encounter (Office Visit) with RUPERTO Pringle - CNP   Medication Sig Dispense Refill    levothyroxine (SYNTHROID) 25 MCG tablet Take 1 tablet by mouth Daily Take 1 tablet by mouth once daily 90 tablet 3    amphetamine-dextroamphetamine (ADDERALL) 20 MG tablet TAKE 1 TABLET BY MOUTH THREE TIMES DAILY      busPIRone (BUSPAR) 30 MG tablet       cephALEXin (KEFLEX) 500 MG capsule Take 1 capsule by mouth 2 times daily for 10 days 20 capsule 0    omeprazole (PRILOSEC) 20 MG delayed release capsule TAKE 1 CAPSULE DAILY IN THE MORNING 90 capsule 0    acetaminophen (TYLENOL) 325 MG tablet Take 650 mg by mouth as needed for Pain       No Known Allergies    Social History     Socioeconomic History    Marital status:      Spouse name: None    Number of children: None    Years of education: None    Highest education level: None   Occupational History    None   Tobacco Use    Smoking status: Never Smoker    Smokeless tobacco: Never Used   Substance and Sexual Activity    Alcohol use: Yes     Alcohol/week: 0.0 standard drinks     Comment: 1 drink every 6 months    Drug use: No    Sexual activity: Yes     Partners: Male     Birth control/protection: I.U.D.    Other Topics Concern    None   Social History Narrative    None     Social Determinants of Health     Financial Resource Strain: High Risk    Difficulty of Paying Living Expenses: Hard   Food Insecurity: No Food Insecurity    Worried About Running Out of Food in the Last Year: Never true    Soumya of Food in the Last Year: Never true   Transportation Needs:     Lack of Transportation (Medical): Not on file    Lack of Transportation (Non-Medical):  Not on file   Physical Activity:     Days of Exercise per Week: Not on file    Minutes of Exercise per Session: Not on file   Stress:     Feeling of Stress : Not on file   Social Connections:     Frequency of Communication with Friends and Family: Not on file    Frequency of Social Gatherings with Friends and Family: Not on file    Attends Sikhism Services: Not on file    Active Member of [address] [place] [address] [employer] or Organizations: Not on file    Attends Club or Organization Meetings: Not on file    Marital Status: Not on file   Intimate Partner Violence:     Fear of Current or Ex-Partner: Not on file    Emotionally Abused: Not on file    Physically Abused: Not on file    Sexually Abused: Not on file   Housing Stability:     Unable to Pay for Housing in the Last Year: Not on file    Number of Places Lived in the Last Year: Not on file    Unstable Housing in the Last Year: Not on file       Family History   Problem Relation Age of Onset    High Blood Pressure Mother     Diabetes Maternal Grandfather     High Blood Pressure Maternal Grandfather     Diabetes Maternal Grandmother     Cancer Maternal Grandmother     Alcohol Abuse Father         alcohol     Cancer Paternal Grandmother          Health Maintenance   Topic Date Due    Varicella vaccine (1 of 2 - 2-dose childhood series) Never done    COVID-19 Vaccine (1) Never done    Cervical cancer screen  02/13/2020    Flu vaccine (Season Ended) 09/01/2022    A1C test (Diabetic or Prediabetic)  04/08/2023    TSH testing  04/08/2023    Depression Monitoring  04/11/2023    DTaP/Tdap/Td vaccine (8 - Td or Tdap) 04/01/2026    Hepatitis B vaccine  Completed    Hib vaccine  Completed    Hepatitis C screen  Completed    HIV screen  Completed    Hepatitis A vaccine  Aged Out    Meningococcal (ACWY) vaccine  Aged Out    Pneumococcal 0-64 years Vaccine  Aged Out       Last eye exam: several years  Last dental exam: < 12 months  Regular exercise? Not since Dec.  Balanced diet? Not really      Review Of Systems:  Skin: no changing moles, abnormal pigmentation, rash, scaling, itching, masses, hair or nail changes  Eyes: no blurring, diplopia, or eye pain  Ears/Nose/Throat: no hearing loss, tinnitus, vertigo, nosebleed, nasal congestion, rhinorrhea, sore throat  Respiratory: no cough, pleuritic chest pain, dyspnea, or wheezing  Cardiovascular: no angina, JUAREZ, orthopnea, PND, palpitations, or claudication  Gastrointestinal: no nausea, vomiting, heartburn, diarrhea, constipation, bloating, or abdominal pain  Genitourinary: no urinary urgency, frequency, dysuria, nocturia, hesitancy, or incontinence  Musculoskeletal: no arthritis, arthralgia, myalgia, weakness, or morning stiffness  Neurologic: no paralysis, paresis, paresthesia, seizures, tremors, or headaches  Hematologic/Lymphatic/Immunologic: no anemia, abnormal bleeding/bruising, fever, chills, night sweats, swollen glands, or unexplained weight loss  Endocrine: no heat or cold intolerance and no polyphagia, polydipsia, or polyuria    PHYSICAL EXAMINATION:  /84   Pulse 92   Wt 225 lb 12.8 oz (102.4 kg)   LMP  (Exact Date)   SpO2 99%   Breastfeeding No   BMI 38.16 kg/m²   General appearance: healthy, alert, no distress  Skin: Skin color, texture, turgor normal. No rashes or suspicious lesions. No induration or tightening palpated. Head: Normocephalic. No masses, lesions, tenderness or abnormalities  Eyes: Conjunctivae/corneas clear. PERRL, EOM's intact. Ears: External ears normal. Canals clear. TM's clear bilaterally. Hearing grossly normal.  Nose/Sinuses: Nares normal.   Oropharynx: Lips, mucosa, and tongue normal. Teeth and gums normal. Oropharynx clear with no exudate seen. Neck: Neck supple, and symmetric. No adenopathy.  Thyroid symmetric, normal size, without nodule. Trachea is midline. Back: Back symmetric, no curvature. ROM normal. No CVA tenderness. Lungs: Good diaphragmatic excursion. Lungs clear to auscultation bilaterally. No retractions or use of accessory muscles. Heart: PMI is not displaced, and no thrill noted. Regular rate and rhythm, with no rub, murmur or gallop noted. Breasts: Exam deferred to OB/GYN  Abdomen: Abdomen soft, non-tender. BS normal. No masses, organomegaly. No hernia noted. Extremities: Extremities normal. No deformities, edema, or skin discoloration. No cyanosis or clubbing noted to the nails. Hands and feet were warm and well-perfused with palpable dorsalis pedis pulses bilaterally. Lymph: No lymphadenopathy of the neck or supraclavicular regions. Musculoskeletal: Spine ROM normal. Muscular strength intact. Neuro: Cranial nerves intact, gait normal. No focal weakness. Pelvic: Exam deferred to OB/GYN        ASSESSMENT/PLAN:  1. Well adult exam  All health maintenance issues were updated. Recommend begin progressive daily aerobic exercise program, follow a low fat, low cholesterol diet, attempt to lose weight, continue current medications and return for routine annual checkups  Advised to update pap    2. Depression with anxiety  Assessment & Plan:   Monitored by specialist- no acute findings meriting change in the plan    3. Attention deficit hyperactivity disorder (ADHD), predominantly inattentive type  Assessment & Plan:   Monitored by specialist- no acute findings meriting change in the plan       4. Acquired hypothyroidism  Assessment & Plan:   At goal, continue current medications  Orders:  -     levothyroxine (SYNTHROID) 25 MCG tablet; Take 1 tablet by mouth Daily Take 1 tablet by mouth once daily, Disp-90 tablet, R-3Normal    5. Severe obesity (BMI 35.0-39. 9) with comorbidity ([facility] [address]  Assessment & Plan:   Improving   Strongly recommend eliminating concentrated sweets, reducing simple carbs. Avoid corn syrup and hydrogenated oils. Focus on fruits, vegs, whole grains, lean meats and push water. Fish oil, high fiber foods recommended. Recommended at least 30 minutes of aerobic exercise daily. 6. Sleep apnea, obstructive  Assessment & Plan:   Resolved   No longer requires CPAP    7. Acute bacterial sinusitis  -     cephALEXin (KEFLEX) 500 MG capsule; Take 1 capsule by mouth 2 times daily for 10 days, Disp-20 capsule, R-0Normal  May continue Mucinex D    See pt instructions  F/u 6 mnths for pap, sooner prn. Discussed use, benefit, and side effects of prescribed medications. All patient questions answered. Pt voiced understanding.

## 2022-05-03 ENCOUNTER — PATIENT MESSAGE (OUTPATIENT)
Dept: FAMILY MEDICINE CLINIC | Age: 37
End: 2022-05-03

## 2022-05-03 RX ORDER — VALACYCLOVIR HYDROCHLORIDE 1 G/1
2000 TABLET, FILM COATED ORAL 2 TIMES DAILY
Qty: 4 TABLET | Refills: 5 | Status: SHIPPED | OUTPATIENT
Start: 2022-05-03 | End: 2022-05-04

## 2022-05-03 NOTE — TELEPHONE ENCOUNTER
From: Tabatha Flynn  To: Chad Jimenez  Sent: 5/3/2022 7:52 AM EDT  Subject: Cold sores    Hi, could I possibly get a prescription for cold sores? I had another canker sore start on the roof of my mouth Saturday, then Sunday started many cold sores on my lip. I just had cold sores in early march as well. It seems to be every 2-3 months I get a cold sore. I use blistex and Haze Hoose as soon as I start to feel them, but they still get pretty bad.      Thank you  Kelvin Meredith

## 2022-09-06 ENCOUNTER — OFFICE VISIT (OUTPATIENT)
Dept: ORTHOPEDIC SURGERY | Age: 37
End: 2022-09-06
Payer: COMMERCIAL

## 2022-09-06 VITALS — HEIGHT: 65 IN | BODY MASS INDEX: 37.49 KG/M2 | WEIGHT: 225 LBS

## 2022-09-06 DIAGNOSIS — M25.512 LEFT SHOULDER PAIN, UNSPECIFIED CHRONICITY: Primary | ICD-10-CM

## 2022-09-06 PROCEDURE — G8427 DOCREV CUR MEDS BY ELIG CLIN: HCPCS | Performed by: ORTHOPAEDIC SURGERY

## 2022-09-06 PROCEDURE — 99204 OFFICE O/P NEW MOD 45 MIN: CPT | Performed by: ORTHOPAEDIC SURGERY

## 2022-09-06 PROCEDURE — G8417 CALC BMI ABV UP PARAM F/U: HCPCS | Performed by: ORTHOPAEDIC SURGERY

## 2022-09-06 PROCEDURE — 1036F TOBACCO NON-USER: CPT | Performed by: ORTHOPAEDIC SURGERY

## 2022-09-06 NOTE — Clinical Note
Dear Roxann Childers,  Thank you very much for the referral and allowing me to participate in this patient's care. Please see the attached note for full details of the visit.   With kind regards, Dano Canales MD

## 2022-09-08 NOTE — PROGRESS NOTES
2022     Reason for visit:  Left shoulder pain following traumatic injury in 2017    History of Present Illness: The patient is a 28-year-old female. She reports injuring her self back in 2017. Ever since then she has had persistent pain within the shoulder. The pain is diffuse about the shoulder including anterior, deep, upper lateral arm. She has pain with overhead activity, reaching, and lifting. No numbness or tingling. She has been treated over the years with cortisone injections and physical therapy but has remained symptomatic. Medical History:  Past Medical History:   Diagnosis Date    Abnormal Pap smear of cervix     prior LEEP. Arthritis     upper cervical area pain    Carpal tunnel syndrome of right wrist 5/10/2017    Gestational diabetes     pre-diabetic--not medically treated    Hypothyroidism     Iron deficiency anemia     Obesity, unspecified     Pancreatitis     PCOS (polycystic ovarian syndrome)     Sleep apnea     requires CPAP while sleeping.       Past Surgical History:   Procedure Laterality Date    CARPAL TUNNEL RELEASE Right 2017    Right carpal tunnel release       SECTION  2016    CHOLECYSTECTOMY, LAPAROSCOPIC  2010    [place]    LEEP      [facility]ana    LEG AMPUTATION  2017    laparoscopic - Dr. Kevin Bowman Bilateral     WISDOM TOOTH EXTRACTION        Family History   Problem Relation Age of Onset    High Blood Pressure Mother     Diabetes Maternal Grandfather     High Blood Pressure Maternal Grandfather     Diabetes Maternal Grandmother     Cancer Maternal Grandmother     Alcohol Abuse Father         alcohol     Cancer Paternal Grandmother       Social History     Socioeconomic History    Marital status:      Spouse name: Not on file    Number of children: Not on file    Years of education: Not on file    Highest education level: Not on file   Occupational History    Not on file   Tobacco Use    Smoking status: Never    Smokeless tobacco: Never   Substance and Sexual Activity    Alcohol use: Yes     Alcohol/week: 0.0 standard drinks     Comment: 1 drink every 6 months    Drug use: No    Sexual activity: Yes     Partners: Male     Birth control/protection: I.U.D. Other Topics Concern    Not on file   Social History Narrative    Not on file     Social Determinants of Health     Financial Resource Strain: High Risk    Difficulty of Paying Living Expenses: Hard   Food Insecurity: No Food Insecurity    Worried About Running Out of Food in the Last Year: Never true    Ran Out of Food in the Last Year: Never true   Transportation Needs: Not on file   Physical Activity: Not on file   Stress: Not on file   Social Connections: Not on file   Intimate Partner Violence: Not on file   Housing Stability: Not on file      Current Outpatient Medications on File Prior to Visit   Medication Sig Dispense Refill    levothyroxine (SYNTHROID) 25 MCG tablet Take 1 tablet by mouth Daily Take 1 tablet by mouth once daily 90 tablet 3    amphetamine-dextroamphetamine (ADDERALL) 20 MG tablet TAKE 1 TABLET BY MOUTH THREE TIMES DAILY      busPIRone (BUSPAR) 30 MG tablet       omeprazole (PRILOSEC) 20 MG delayed release capsule TAKE 1 CAPSULE DAILY IN THE MORNING 90 capsule 0    acetaminophen (TYLENOL) 325 MG tablet Take 650 mg by mouth as needed for Pain       No current facility-administered medications on file prior to visit. No Known Allergies     Review of Systems:  Constitutional: Patient is adequately groomed with no evidence of malnutrition  Mental Status: The patient is oriented to time, place and person. The patient's mood and affect are appropriate. Lymphatic: The lymphatic examination bilaterally reveals all areas to be without enlargement or induration. Vascular: Examination reveals no swelling or calf tenderness. Peripheral pulses are palpable and 2+. Neurological: The patient has good coordination.   There is no weakness or sensory deficit. Skin:  Head/Neck: inspection reveals no rashes, ulcerations or lesions. Trunk: inspection reveals no rashes, ulcerations or lesions. Objective:  Ht 5' 4.5\" (1.638 m)   Wt 225 lb (102.1 kg)   BMI 38.02 kg/m²      Physical Exam:  The patient is well-appearing and in no apparent distress  negSpurling's test  Examination of the left shoulder  There is no swelling, ecchymosis, or gross deformity  There is no evidence of muscle atrophy  + Aviles test, neg Neers test  neg bicipital groove tenderness, + AC joint tenderness  Range of motion reveals 130 degrees of forward flexion, 130 degrees of abduction, 40 degrees of external rotation, internal rotation to low thoracic spine  4+/5 strength with resisted abduction, 4+/5 strength with resisted external rotation, 5/5 strength with resisted internal rotation  Intact motor and sensory function throughout the median/radial/ulnar/PIN/AIN distributions  Palpable radial pulse, brisk cap refill, 2+ symmetric reflexes     Imagin view x-rays of the left shoulder were obtained in the office today on 2022 and reviewed. There is no fracture or dislocation. Degenerative changes of acromioclavicular joint present. Assessment:  Left shoulder pain. Suspect symptomatic acromioclavicular degenerative joint disease with possible rotator cuff tear    Plan:  I discussed with the patient the differential diagnosis. Given the duration of her symptoms combined with her lack of improvement with conservative measures and exam I would recommend an MRI. She is in agreement. Following that study she will return to the office to review the results and discuss definitive treatment options    Greater than 45 minutes were spent with this encounter. Time spent included evaluating the patient's chart prior to arrival.  Evaluating the patient in the office including history, physical examination, imaging reviewing, and counseling on next steps.   Lastly, time was spent discussing orders with my staff as well as providing documentation in the chart. Virginia Chapin MD            Orthopaedic Surgery Sports Medicine and [address] [facility]            Team Physician [facility] ([place]      Disclaimer: This note was dictated with voice recognition software. Though review and correction are routine, we apologize for any errors.

## 2022-09-15 ENCOUNTER — TELEPHONE (OUTPATIENT)
Dept: ORTHOPEDIC SURGERY | Age: 37
End: 2022-09-15

## 2022-09-15 DIAGNOSIS — M25.512 LEFT SHOULDER PAIN, UNSPECIFIED CHRONICITY: Primary | ICD-10-CM

## 2022-09-15 NOTE — TELEPHONE ENCOUNTER
Patient is requesting something for anxiety for her upcoming mri scheduled this Monday. Please call patient to advise.

## 2022-09-16 RX ORDER — DIAZEPAM 5 MG/1
5 TABLET ORAL
Qty: 1 TABLET | Refills: 0 | Status: SHIPPED | OUTPATIENT
Start: 2022-09-16 | End: 2022-09-16

## 2022-09-16 NOTE — TELEPHONE ENCOUNTER
[facility] out of network for patient. I will submit new order for FF, but also asked patient call back to confirm this is okay. Also will send 1 time valium for scan.

## 2022-09-20 NOTE — TELEPHONE ENCOUNTER
PER LAKESHA, [facility] IS ALSO OUT OF NETWORK. Was given a short list of where patient can go to mri completed. She may want to reach out to her insurance company to verify where she can go for MRI as well versus what is close to her. [address]. Spoke with pt. She confirmed with her insurance that she could go to [facility]. I let her know I would notify pre-cert. She is already scheduled.

## 2022-09-30 ENCOUNTER — HOSPITAL ENCOUNTER (OUTPATIENT)
Dept: MRI IMAGING | Age: 37
Discharge: HOME | End: 2022-09-30
Payer: COMMERCIAL

## 2022-09-30 DIAGNOSIS — M25.512 LEFT SHOULDER PAIN, UNSPECIFIED CHRONICITY: ICD-10-CM

## 2022-09-30 PROCEDURE — 73221 MRI JOINT UPR EXTREM W/O DYE: CPT

## 2022-10-06 ENCOUNTER — OFFICE VISIT (OUTPATIENT)
Dept: ORTHOPEDIC SURGERY | Age: 37
End: 2022-10-06
Payer: COMMERCIAL

## 2022-10-06 ENCOUNTER — TELEPHONE (OUTPATIENT)
Dept: ORTHOPEDIC SURGERY | Age: 37
End: 2022-10-06

## 2022-10-06 VITALS — WEIGHT: 225 LBS | BODY MASS INDEX: 37.49 KG/M2 | HEIGHT: 65 IN

## 2022-10-06 DIAGNOSIS — M19.012 ARTHRITIS OF LEFT ACROMIOCLAVICULAR JOINT: Primary | ICD-10-CM

## 2022-10-06 PROCEDURE — G8417 CALC BMI ABV UP PARAM F/U: HCPCS | Performed by: ORTHOPAEDIC SURGERY

## 2022-10-06 PROCEDURE — G8427 DOCREV CUR MEDS BY ELIG CLIN: HCPCS | Performed by: ORTHOPAEDIC SURGERY

## 2022-10-06 PROCEDURE — G8484 FLU IMMUNIZE NO ADMIN: HCPCS | Performed by: ORTHOPAEDIC SURGERY

## 2022-10-06 PROCEDURE — 1036F TOBACCO NON-USER: CPT | Performed by: ORTHOPAEDIC SURGERY

## 2022-10-06 PROCEDURE — 99215 OFFICE O/P EST HI 40 MIN: CPT | Performed by: ORTHOPAEDIC SURGERY

## 2022-10-06 NOTE — TELEPHONE ENCOUNTER
CPT: [id number]  AUTH: NPR PER IVR  INSURANCE: [facility]  LOCATION [facility]  NOTE: REF- [id number]

## 2022-10-10 NOTE — PROGRESS NOTES
10/6/2022     Reason for visit:  Left shoulder pain following traumatic injury in 2017    History of Present Illness: The patient is a 79-year-old female. She reports injuring her self back in 2017. Ever since then she has had persistent pain within the shoulder. The pain is diffuse about the shoulder including anterior, deep, upper lateral arm. She has pain with overhead activity, reaching, and lifting. No numbness or tingling. She has been treated over the years with cortisone injections and physical therapy but has remained symptomatic. At the last visit we did elect to proceed with an MRI. She is here to review the results. Objective:  Ht 5' 4.5\" (1.638 m)   Wt 225 lb (102.1 kg)   BMI 38.02 kg/m²      Physical Exam:  The patient is well-appearing and in no apparent distress  negSpurling's test  Examination of the left shoulder  There is no swelling, ecchymosis, or gross deformity  There is no evidence of muscle atrophy  + Aviles test, neg Neers test  neg bicipital groove tenderness, + AC joint tenderness  Range of motion reveals 130 degrees of forward flexion, 130 degrees of abduction, 40 degrees of external rotation, internal rotation to low thoracic spine  4+/5 strength with resisted abduction, 4+/5 strength with resisted external rotation, 5/5 strength with resisted internal rotation  Intact motor and sensory function throughout the median/radial/ulnar/PIN/AIN distributions  Palpable radial pulse, brisk cap refill, 2+ symmetric reflexes     Imagin view x-rays of the left shoulder were obtained in the office today on 2022 and reviewed. There is no fracture or dislocation. Degenerative changes of acromioclavicular joint present. MRI of the left shoulder was reviewed. Degenerative changes of acromioclavicular joint present. Rotator cuff tendinopathy present. No rotator cuff tear      Assessment:  Left shoulder pain.   Suspect symptomatic acromioclavicular degenerative joint disease and impingement    Plan:  I had a long discussion with the patient. We spent time reviewing the MRI findings. Unfortunately she remains symptomatic despite conservative measures. I do feel that the vast majority of her pain is from the acromioclavicular joint. She also has some clinical signs of impingement. As result I do think she would be a candidate for left shoulder arthroscopy with distal clavicle excision and possible subacromial decompression. The risk were defined as not limited to infection, bleeding, damage to blood vessels or nerves, need for additional surgery. She does understand this and elects proceed. Greater than 45 minutes were spent with this encounter. Time spent included evaluating the patient's chart prior to arrival.  Evaluating the patient in the office including history, physical examination, imaging reviewing, and counseling on next steps. Lastly, time was spent discussing orders with my staff as well as providing documentation in the chart. Nay Quinn MD            Orthopaedic Surgery Sports Medicine and [address] [facility] [address] [facility] Physician [facility] ([place]      Disclaimer: This note was dictated with voice recognition software. Though review and correction are routine, we apologize for any errors.

## 2022-10-11 ENCOUNTER — OFFICE VISIT (OUTPATIENT)
Dept: FAMILY MEDICINE CLINIC | Age: 37
End: 2022-10-11
Payer: COMMERCIAL

## 2022-10-11 VITALS
BODY MASS INDEX: 41.64 KG/M2 | SYSTOLIC BLOOD PRESSURE: 112 MMHG | OXYGEN SATURATION: 100 % | HEART RATE: 64 BPM | DIASTOLIC BLOOD PRESSURE: 84 MMHG | WEIGHT: 246.4 LBS

## 2022-10-11 DIAGNOSIS — Z01.419 WELL WOMAN EXAM: Primary | ICD-10-CM

## 2022-10-11 DIAGNOSIS — K21.9 CHRONIC GERD: ICD-10-CM

## 2022-10-11 DIAGNOSIS — E66.01 CLASS 3 SEVERE OBESITY DUE TO EXCESS CALORIES WITH SERIOUS COMORBIDITY AND BODY MASS INDEX (BMI) OF 40.0 TO 44.9 IN ADULT (HCC): ICD-10-CM

## 2022-10-11 PROBLEM — E66.813 CLASS 3 SEVERE OBESITY DUE TO EXCESS CALORIES WITH SERIOUS COMORBIDITY AND BODY MASS INDEX (BMI) OF 40.0 TO 44.9 IN ADULT: Status: ACTIVE | Noted: 2019-06-03

## 2022-10-11 PROBLEM — R32 URINARY INCONTINENCE: Status: RESOLVED | Noted: 2017-04-11 | Resolved: 2022-10-11

## 2022-10-11 LAB
BILIRUBIN, POC: NORMAL
BLOOD URINE, POC: NORMAL
CLARITY, POC: NORMAL
COLOR, POC: YELLOW
GLUCOSE URINE, POC: NORMAL
KETONES, POC: NORMAL
LEUKOCYTE EST, POC: NORMAL
NITRITE, POC: NORMAL
PH, POC: 7
PROTEIN, POC: NORMAL
SPECIFIC GRAVITY, POC: 1.02
UROBILINOGEN, POC: 0.2

## 2022-10-11 PROCEDURE — G8484 FLU IMMUNIZE NO ADMIN: HCPCS | Performed by: NURSE PRACTITIONER

## 2022-10-11 PROCEDURE — 81002 URINALYSIS NONAUTO W/O SCOPE: CPT | Performed by: NURSE PRACTITIONER

## 2022-10-11 PROCEDURE — 99395 PREV VISIT EST AGE 18-39: CPT | Performed by: NURSE PRACTITIONER

## 2022-10-11 RX ORDER — OMEPRAZOLE 20 MG/1
CAPSULE, DELAYED RELEASE ORAL
Qty: 90 CAPSULE | Refills: 3 | Status: SHIPPED | OUTPATIENT
Start: 2022-10-11 | End: 2022-10-11 | Stop reason: SDUPTHER

## 2022-10-11 RX ORDER — LEVONORGESTREL 52 MG/1
1 INTRAUTERINE DEVICE INTRAUTERINE
Qty: 1 EACH | Refills: 0 | COMMUNITY
Start: 2022-10-11

## 2022-10-11 RX ORDER — OMEPRAZOLE 40 MG/1
40 CAPSULE, DELAYED RELEASE ORAL DAILY
Qty: 90 CAPSULE | Refills: 1 | Status: SHIPPED | OUTPATIENT
Start: 2022-10-11 | End: 2023-04-09

## 2022-10-11 ASSESSMENT — PATIENT HEALTH QUESTIONNAIRE - PHQ9
1. LITTLE INTEREST OR PLEASURE IN DOING THINGS: 0
10. IF YOU CHECKED OFF ANY PROBLEMS, HOW DIFFICULT HAVE THESE PROBLEMS MADE IT FOR YOU TO DO YOUR WORK, TAKE CARE OF THINGS AT HOME, OR GET ALONG WITH OTHER PEOPLE: 0
6. FEELING BAD ABOUT YOURSELF - OR THAT YOU ARE A FAILURE OR HAVE LET YOURSELF OR YOUR FAMILY DOWN: 0
4. FEELING TIRED OR HAVING LITTLE ENERGY: 0
3. TROUBLE FALLING OR STAYING ASLEEP: 0
SUM OF ALL RESPONSES TO PHQ QUESTIONS 1-9: 0
9. THOUGHTS THAT YOU WOULD BE BETTER OFF DEAD, OR OF HURTING YOURSELF: 0
5. POOR APPETITE OR OVEREATING: 0
8. MOVING OR SPEAKING SO SLOWLY THAT OTHER PEOPLE COULD HAVE NOTICED. OR THE OPPOSITE, BEING SO FIGETY OR RESTLESS THAT YOU HAVE BEEN MOVING AROUND A LOT MORE THAN USUAL: 0
7. TROUBLE CONCENTRATING ON THINGS, SUCH AS READING THE NEWSPAPER OR WATCHING TELEVISION: 0
2. FEELING DOWN, DEPRESSED OR HOPELESS: 0
SUM OF ALL RESPONSES TO PHQ QUESTIONS 1-9: 0
SUM OF ALL RESPONSES TO PHQ9 QUESTIONS 1 & 2: 0
SUM OF ALL RESPONSES TO PHQ QUESTIONS 1-9: 0
SUM OF ALL RESPONSES TO PHQ QUESTIONS 1-9: 0

## 2022-10-11 NOTE — PROGRESS NOTES
SUBJECTIVE:   30 y.o. female for annual routine Pap and checkup. No LMP recorded (exact date). Patient has had an implant. Past Medical History:   Diagnosis Date    Abnormal Pap smear of cervix 2014    prior LEEP. Arthritis     upper cervical area pain    Carpal tunnel syndrome of right wrist 5/10/2017    Gestational diabetes     pre-diabetic--not medically treated    Hypothyroidism     Iron deficiency anemia     Obesity, unspecified     Pancreatitis 2010    PCOS (polycystic ovarian syndrome)     Sleep apnea     requires CPAP while sleeping. Social History     Socioeconomic History    Marital status:      Spouse name: Not on file    Number of children: Not on file    Years of education: Not on file    Highest education level: Not on file   Occupational History    Not on file   Tobacco Use    Smoking status: Never    Smokeless tobacco: Never   Substance and Sexual Activity    Alcohol use: Yes     Alcohol/week: 0.0 standard drinks     Comment: 1 drink every 6 months    Drug use: No    Sexual activity: Yes     Partners: Male     Birth control/protection: I.U.D.    Other Topics Concern    Not on file   Social History Narrative    Not on file     Social Determinants of Health     Financial Resource Strain: High Risk    Difficulty of Paying Living Expenses: Hard   Food Insecurity: No Food Insecurity    Worried About Running Out of Food in the Last Year: Never true    Ran Out of Food in the Last Year: Never true   Transportation Needs: Not on file   Physical Activity: Not on file   Stress: Not on file   Social Connections: Not on file   Intimate Partner Violence: Not on file   Housing Stability: Not on file     No Known Allergies    Outpatient Medications Marked as Taking for the 10/11/22 encounter (Office Visit) with Christel Opitz, APRN - CNP   Medication Sig Dispense Refill    levonorgestrel (MIRENA, 52 MG,) IUD 52 mg 1 each by IntraUTERine route 1 each 0    omeprazole (PRILOSEC) 40 MG delayed release capsule Take 1 capsule by mouth Daily TAKE 1 CAPSULE DAILY IN THE MORNING 90 capsule 1    levothyroxine (SYNTHROID) 25 MCG tablet Take 1 tablet by mouth Daily Take 1 tablet by mouth once daily 90 tablet 3    amphetamine-dextroamphetamine (ADDERALL) 20 MG tablet TAKE 1 TABLET BY MOUTH THREE TIMES DAILY      busPIRone (BUSPAR) 30 MG tablet       acetaminophen (TYLENOL) 325 MG tablet Take 650 mg by mouth as needed for Pain          HPI:    Mirena placed 2019 by Dr Darnell Yarbrough- no complaints. No longer having menstrual cycle. Will occasionally spot. No need for pad or tampon    GERD: c/o acid in throat at night when sleeping. Causes her to gasp for air and cough. Usually happens when eating within 90 min of bed. Denies abd pain, no nausea or vomiting. Worsening with weight gain as well. Wt Readings from Last 3 Encounters:   10/11/22 246 lb 6.4 oz (111.8 kg)   10/06/22 225 lb (102.1 kg)   09/30/22 240 lb (108.9 kg)         ROS:  Feeling well. No dyspnea or chest pain on exertion. See HPI. No abdominal pain, change in bowel habits, black or bloody stools. No urinary tract symptoms. GYN ROS: no menses, no abnormal bleeding, pelvic pain or discharge, no breast pain or new or enlarging lumps on self exam. No neurological complaints. Health Maintenance   Topic Date Due    COVID-19 Vaccine (1) Never done    Varicella vaccine (1 of 2 - 2-dose childhood series) Never done    Cervical cancer screen  02/13/2020    Flu vaccine (1) 08/01/2022    A1C test (Diabetic or Prediabetic)  04/08/2023    Depression Monitoring  04/11/2023    DTaP/Tdap/Td vaccine (8 - Td or Tdap) 04/01/2026    Hib vaccine  Completed    Hepatitis C screen  Completed    HIV screen  Completed    Hepatitis A vaccine  Aged Out    Meningococcal (ACWY) vaccine  Aged Out    Pneumococcal 0-64 years Vaccine  Aged Out       Any history of abnormal pap?  2014      OBJECTIVE:   /84   Pulse 64   Wt 246 lb 6.4 oz (111.8 kg)   LMP  (Exact Date)   SpO2 100%   BMI 41.64 kg/m²   The patient appears well, alert, oriented x 3, in no distress. ENT: PERRLA, EOMI, MMM  Neck supple. No adenopathy or thyromegaly. Lungs are clear, good air entry, no wheezes, rhonchi or rales. CV: S1 and S2 normal, no murmurs, regular rate and rhythm. Abdomen soft without tenderness, guarding, mass or organomegaly. Extremities show no edema, normal peripheral pulses. Neurological is normal, no focal findings. BREAST EXAM: breasts appear normal, no suspicious masses, no skin or nipple changes or axillary nodes    PELVIC EXAM: normal external genitalia, vulva, vagina, cervix, uterus and adnexa. IUD string present. Scant brown blood present    UA:   Results for POC orders placed in visit on 10/11/22   POCT Urinalysis no Micro   Result Value Ref Range    Color, UA yellow     Clarity, UA cloudy     Glucose, UA POC neg     Bilirubin, UA neg     Ketones, UA neg     Spec Grav, UA 1.020     Blood, UA POC trace-intact     pH, UA 7.0     Protein, UA POC neg     Urobilinogen, UA 0.2     Leukocytes, UA neg     Nitrite, UA neg          ASSESSMENT:   1. Well woman exam    2. Chronic GERD    3. Class 3 severe obesity due to excess calories with serious comorbidity and body mass index (BMI) of 40.0 to 44.9 in adult [facility])        PLAN:   Well woman exam  -     POCT Urinalysis no Micro  -     PAP SMEAR    Chronic GERD  Uncontrolled   Increase dose today-     omeprazole (PRILOSEC) 40 MG delayed release capsule; Take 1 capsule by mouth Daily TAKE 1 CAPSULE DAILY IN THE MORNING  Will decrease to 20 mg in 3 month  Recommend weight loss    Class 3 severe obesity due to excess calories with serious comorbidity and body mass index (BMI) of 40.0 to 44.9 in adult ([facility])  Recommend weight reduction, healthy diet and regular exercise    See pt instructions  F/u 1 yr, sooner prn  Discussed use, benefit, and side effects of prescribed medications. All patient questions answered. Pt voiced understanding.

## 2022-10-12 LAB
HPV COMMENT: NORMAL
HPV TYPE 16: NOT DETECTED
HPV TYPE 18: NOT DETECTED
HPVOH (OTHER TYPES): NOT DETECTED

## 2022-10-19 ENCOUNTER — TELEPHONE (OUTPATIENT)
Dept: ORTHOPEDIC SURGERY | Age: 37
End: 2022-10-19

## 2022-10-19 DIAGNOSIS — M19.012 ARTHRITIS OF LEFT ACROMIOCLAVICULAR JOINT: Primary | ICD-10-CM

## 2022-10-20 RX ORDER — HYDROCODONE BITARTRATE AND ACETAMINOPHEN 10; 325 MG/1; MG/1
1 TABLET ORAL EVERY 4 HOURS PRN
Qty: 30 TABLET | Refills: 0 | Status: SHIPPED | OUTPATIENT
Start: 2022-10-20 | End: 2022-10-27

## 2022-10-20 RX ORDER — ASPIRIN 325 MG
325 TABLET, DELAYED RELEASE (ENTERIC COATED) ORAL DAILY
Qty: 14 TABLET | Refills: 0 | Status: SHIPPED | OUTPATIENT
Start: 2022-10-20 | End: 2022-11-03

## 2022-10-20 RX ORDER — ONDANSETRON 4 MG/1
4 TABLET, FILM COATED ORAL EVERY 8 HOURS PRN
Qty: 21 TABLET | Refills: 0 | Status: SHIPPED | OUTPATIENT
Start: 2022-10-20 | End: 2022-10-27

## 2022-10-20 NOTE — H&P
10/6/2022      Reason for visit:  Left shoulder pain following traumatic injury in 2017     History of Present Illness: The patient is a 29-year-old female. She reports injuring her self back in 2017. Ever since then she has had persistent pain within the shoulder. The pain is diffuse about the shoulder including anterior, deep, upper lateral arm. She has pain with overhead activity, reaching, and lifting. No numbness or tingling. She has been treated over the years with cortisone injections and physical therapy but has remained symptomatic. At the last visit we did elect to proceed with an MRI. She is here to review the results. Objective:  Ht 5' 4.5\" (1.638 m)   Wt 225 lb (102.1 kg)   BMI 38.02 kg/m²       Physical Exam:  The patient is well-appearing and in no apparent distress  negSpurling's test  CV-RRR  Pulm-CTAB    Examination of the left shoulder  There is no swelling, ecchymosis, or gross deformity  There is no evidence of muscle atrophy  + Aviles test, neg Neers test  neg bicipital groove tenderness, + AC joint tenderness  Range of motion reveals 130 degrees of forward flexion, 130 degrees of abduction, 40 degrees of external rotation, internal rotation to low thoracic spine  4+/5 strength with resisted abduction, 4+/5 strength with resisted external rotation, 5/5 strength with resisted internal rotation  Intact motor and sensory function throughout the median/radial/ulnar/PIN/AIN distributions  Palpable radial pulse, brisk cap refill, 2+ symmetric reflexes      Imagin view x-rays of the left shoulder were obtained in the office today on 2022 and reviewed. There is no fracture or dislocation. Degenerative changes of acromioclavicular joint present. MRI of the left shoulder was reviewed. Degenerative changes of acromioclavicular joint present. Rotator cuff tendinopathy present. No rotator cuff tear        Assessment:  Left shoulder pain.   Suspect symptomatic acromioclavicular degenerative joint disease and impingement     Plan:  I had a long discussion with the patient. We spent time reviewing the MRI findings. Unfortunately she remains symptomatic despite conservative measures. I do feel that the vast majority of her pain is from the acromioclavicular joint. She also has some clinical signs of impingement. As result I do think she would be a candidate for left shoulder arthroscopy with distal clavicle excision and possible subacromial decompression. The risk were defined as not limited to infection, bleeding, damage to blood vessels or nerves, need for additional surgery. She does understand this and elects proceed. Greater than 45 minutes were spent with this encounter. Time spent included evaluating the patient's chart prior to arrival.  Evaluating the patient in the office including history, physical examination, imaging reviewing, and counseling on next steps. Lastly, time was spent discussing orders with my staff as well as providing documentation in the chart.                       Panchito Hirsch MD            Orthopaedic Surgery Sports Medicine and [address] [facility] and [facility]            Team Physician [place] ([facility])

## 2022-10-20 NOTE — PROGRESS NOTES
Name_______________________________________Printed:____________________  Date and time of surgery______10/21/22 1145__________________Arrival Time:____[facility]   1. The instructions given regarding when and if a patient needs to stop oral intake prior to surgery varies. Follow the specific instructions you were given                  _x__Nothing to eat or to drink after Midnight the night before.                   ____Carbo loading or ERAS instructions will be given to select patients-if you have been given those instructions -please do the following                           The evening before your surgery after dinner before midnight drink 40 ounces of gatorade. If you are diabetic use sugar free. The morning of surgery drink 40 ounces of water. This needs to be finished 3 hours prior to your surgery start time. 2. Take the following pills with a small sip of water on the morning of surgery_________synthroid, prilosec__________________________________________                  Do not take blood pressure medications ending in pril or sartan the rebecca prior to surgery or the morning of surgery_   3. Aspirin, Ibuprofen, Advil, Naproxen, Vitamin E and other Anti-inflammatory products and supplements should be stopped for 5 -7days before surgery or as directed by your physician. 4. Check with your Doctor regarding stopping Plavix, Coumadin,Eliquis, Lovenox,Effient,Pradaxa,Xarelto, Fragmin or other blood thinners and follow their instructions. 5. Do not smoke, and do not drink any alcoholic beverages 24 hours prior to surgery. This includes NA Beer. Refrain from the usage of any recreational drugs. 6. You may brush your teeth and gargle the morning of surgery. DO NOT SWALLOW WATER   7. You MUST make arrangements for a responsible adult to stay on site while you are here and take you home after your surgery. You will not be allowed to leave alone or drive yourself home.   It is strongly suggested someone stay with you the first 24 hrs. Your surgery will be cancelled if you do not have a ride home. 8. A parent/legal guardian must accompany a child scheduled for surgery and plan to stay at the hospital until the child is discharged. Please do not bring other children with you. 9. Please wear simple, loose fitting clothing to the hospital.  Fredy Miller not bring valuables (money, credit cards, checkbooks, etc.) Do not wear any makeup (including no eye makeup) or nail polish on your fingers or toes. 10. DO NOT wear any jewelry or piercings on day of surgery. All body piercing jewelry must be removed. 11. If you have ___dentures, they will be removed before going to the OR; we will provide you a container. If you wear ___contact lenses or ___glasses, they will be removed; please bring a case for them. 12. Please see your family doctor/pediatrician for a history & physical and/or concerning medications. Bring any test results/reports from your physician's office. PCP_________x_________Phone___________H&P Appt. Date________             13 If you  have a Living Will and Durable Power of  for Healthcare, please bring in a copy. 15. Notify your Surgeon if you develop any illness between now and surgery  time, cough, cold, fever, sore throat, nausea, vomiting, etc.  Please notify your surgeon if you experience dizziness, shortness of breath or blurred vision between now & the time of your surgery             15. DO NOT shave your operative site 96 hours prior to surgery. For face & neck surgery, men may use an electric razor 48 hours prior to surgery. 16. Shower the night before or morning of surgery using an antibacterial soap or as you have been instructed. 17. To provide excellent care visitors will be limited to one in the room at any given time. 18.  Please bring picture ID and insurance card.              19.  Visit our web site for additional information:  [email or web address]              20.During flu season no children under the age of 15 are permitted in the hospital for the safety of all patients. 21. If you take a long acting insulin in the evening only  take half of your usual  dose the night  before your procedure              22. If you use a c-pap please bring DOS if staying overnight,             23.For your convenience [facility] has a pharmacy on site to fill your prescriptions. 24. If you use oxygen and have a portable tank please bring it  with you the DOS             25. Bring a complete list of all your medications with name and dose include any supplements. 26. Other__________________________________________   *Please call pre admission testing if you any further questions   Terrence Ng   [address] [place] [address]. [place]  [phone]   [address] [place] [address]       VISITOR POLICY(subject to change)    Current policy is 2 visitors per patient. No children. Mask is  at the discretion of the facility. Visiting hours are 8a-8p. Overnight visitors will be at the discretion of the nurse. All policies subject to change. All above information reviewed with patient in person or by phone. Patient verbalizes understanding. All questions and concerns addressed.                                                                                                  Patient/Rep__________pt__________                                                                                                                                    PRE OP INSTRUCTIONS

## 2022-10-20 NOTE — DISCHARGE INSTRUCTIONS
Orthopedic Surgery Discharge Instructions    Medications:   A pain medication has been prescribed for you. Please take this as instructed by the pharmacist.  An anti-nausea medication has been prescribed for you to use as needed for nausea. Either aspirin or a blood thinner medication may have been prescribed for you. Please take this as instructed. Activity:  Sling for comfort only. Once block wears off you may remove the sling as use shoulder as tolerated. Incision/dressings: You may remove your dressing in 72 hours. Until then the dressing needs to remain clean and dry. Following removal dressing please apply dry dressing daily. You may shower in 72 hours and allow the water to run over the incision. However no submerging underwater or getting in pools. Follow-up: If you do not already have a postoperative follow-up appointment scheduled you should call to schedule an appointment within 10 to 14 days of surgery. Emergency or after hours questions:  Please call the main call center at [phone] for all questions or concerns during evening and weekend hours. The call center will direct your call to the on-call physician to answer your questions and concerns. During weekly office hours you may call my assistant, Ene, at [phone]. Janene Olea MD            Orthopaedic Surgery Sports Medicine and [address] [facility] and [facility] Physician [facility])      [address] your seatbelt home. You are under the influence of drugs-do not drink alcohol, drive, operate machinery, make any important decisions or sign any legal documents for 24 hours. A responsible adult needs to be with you for 24 hours. You may experience lightheadedness, dizziness, or sleepiness following surgery.   Rest at home today- increase activity as tolerated. Progress slowly to a regular diet unless your physician has instructed you otherwise. Drink plenty of water. If persistent nausea and vomiting becomes a problem, call your physician. Coughing, sore throat and muscle aches are other side effects of anesthesia, and should improve with time. Do not drive or operate machinery while taking narcotics. Females of childbearing potential and on hormonal birth control, should use two forms of contraception following procedure if given a medication called sugammadex and/or emend. Additional contraception should be used for 7 days for sugammadex and/or 28 days for emend. These medications have a potential to reduce the effectiveness of hormonal birth control. GENERAL SURGERY DISCHARGE INSTRUCTIONS    Follow your surgeons instructions. Follow up with your surgeon as directed. Observe the operative area for signs of excessive bleeding. If needed apply pressure,elevate if able and contact your surgeon. Observe the operative site for any signs of infection- such as increased pain,redness,fever greater than 101 degrees,swelling, foul odor or drainage. Contact your surgeon if any of these symptoms are present. Keep operative site clean and dry. Do not remove dressing unless instructed to by surgeon. Apply ice as directed. If unable to urinate once you are at home,  notify your surgeon or go to the Emergency Room. Avoid pulling,pushing or tugging to suture line. If you become short of breath call your doctor or go to the ER. Take medications as directed. Pain medication should be taken with food. Do not drive or operate machinery while taking narcotics. For any problems or question call your surgeon.

## 2022-10-21 ENCOUNTER — ANESTHESIA (OUTPATIENT)
Dept: OPERATING ROOM | Age: 37
End: 2022-10-21
Payer: COMMERCIAL

## 2022-10-21 ENCOUNTER — ANESTHESIA EVENT (OUTPATIENT)
Dept: OPERATING ROOM | Age: 37
End: 2022-10-21
Payer: COMMERCIAL

## 2022-10-21 ENCOUNTER — HOSPITAL ENCOUNTER (OUTPATIENT)
Age: 37
Setting detail: OUTPATIENT SURGERY
Discharge: HOME | End: 2022-10-21
Attending: ORTHOPAEDIC SURGERY | Admitting: ORTHOPAEDIC SURGERY
Payer: COMMERCIAL

## 2022-10-21 VITALS
SYSTOLIC BLOOD PRESSURE: 116 MMHG | HEART RATE: 75 BPM | BODY MASS INDEX: 41.09 KG/M2 | WEIGHT: 246.6 LBS | DIASTOLIC BLOOD PRESSURE: 79 MMHG | RESPIRATION RATE: 18 BRPM | HEIGHT: 65 IN | TEMPERATURE: 97.6 F | OXYGEN SATURATION: 94 %

## 2022-10-21 LAB — HCG(URINE) PREGNANCY TEST: NEGATIVE

## 2022-10-21 PROCEDURE — 64415 NJX AA&/STRD BRCH PLXS IMG: CPT | Performed by: ANESTHESIOLOGY

## 2022-10-21 PROCEDURE — 7100000010 HC PHASE II RECOVERY - FIRST 15 MIN: Performed by: ORTHOPAEDIC SURGERY

## 2022-10-21 PROCEDURE — 3700000000 HC ANESTHESIA ATTENDED CARE: Performed by: ORTHOPAEDIC SURGERY

## 2022-10-21 PROCEDURE — 84703 CHORIONIC GONADOTROPIN ASSAY: CPT

## 2022-10-21 PROCEDURE — 2500000003 HC RX 250 WO HCPCS: Performed by: ORTHOPAEDIC SURGERY

## 2022-10-21 PROCEDURE — 6360000002 HC RX W HCPCS: Performed by: ANESTHESIOLOGY

## 2022-10-21 PROCEDURE — 3600000014 HC SURGERY LEVEL 4 ADDTL 15MIN: Performed by: ORTHOPAEDIC SURGERY

## 2022-10-21 PROCEDURE — 7100000001 HC PACU RECOVERY - ADDTL 15 MIN: Performed by: ORTHOPAEDIC SURGERY

## 2022-10-21 PROCEDURE — 2500000003 HC RX 250 WO HCPCS: Performed by: ANESTHESIOLOGY

## 2022-10-21 PROCEDURE — 3600000004 HC SURGERY LEVEL 4 BASE: Performed by: ORTHOPAEDIC SURGERY

## 2022-10-21 PROCEDURE — 7100000000 HC PACU RECOVERY - FIRST 15 MIN: Performed by: ORTHOPAEDIC SURGERY

## 2022-10-21 PROCEDURE — 2709999900 HC NON-CHARGEABLE SUPPLY: Performed by: ORTHOPAEDIC SURGERY

## 2022-10-21 PROCEDURE — 6360000002 HC RX W HCPCS: Performed by: ORTHOPAEDIC SURGERY

## 2022-10-21 PROCEDURE — 3700000001 HC ADD 15 MINUTES (ANESTHESIA): Performed by: ORTHOPAEDIC SURGERY

## 2022-10-21 PROCEDURE — 2720000010 HC SURG SUPPLY STERILE: Performed by: ORTHOPAEDIC SURGERY

## 2022-10-21 PROCEDURE — 2500000003 HC RX 250 WO HCPCS: Performed by: NURSE ANESTHETIST, CERTIFIED REGISTERED

## 2022-10-21 PROCEDURE — 6360000002 HC RX W HCPCS: Performed by: NURSE ANESTHETIST, CERTIFIED REGISTERED

## 2022-10-21 PROCEDURE — 2580000003 HC RX 258: Performed by: ORTHOPAEDIC SURGERY

## 2022-10-21 PROCEDURE — 7100000011 HC PHASE II RECOVERY - ADDTL 15 MIN: Performed by: ORTHOPAEDIC SURGERY

## 2022-10-21 RX ORDER — BUPIVACAINE HYDROCHLORIDE 5 MG/ML
INJECTION, SOLUTION EPIDURAL; INTRACAUDAL
Status: COMPLETED | OUTPATIENT
Start: 2022-10-21 | End: 2022-10-21

## 2022-10-21 RX ORDER — LABETALOL HYDROCHLORIDE 5 MG/ML
10 INJECTION, SOLUTION INTRAVENOUS
Status: CANCELLED | OUTPATIENT
Start: 2022-10-21

## 2022-10-21 RX ORDER — HYDRALAZINE HYDROCHLORIDE 20 MG/ML
10 INJECTION INTRAMUSCULAR; INTRAVENOUS
Status: CANCELLED | OUTPATIENT
Start: 2022-10-21

## 2022-10-21 RX ORDER — LIDOCAINE HYDROCHLORIDE 10 MG/ML
1 INJECTION, SOLUTION EPIDURAL; INFILTRATION; INTRACAUDAL; PERINEURAL
Status: CANCELLED | OUTPATIENT
Start: 2022-10-21 | End: 2022-10-22

## 2022-10-21 RX ORDER — MIDAZOLAM HYDROCHLORIDE 2 MG/2ML
2 INJECTION, SOLUTION INTRAMUSCULAR; INTRAVENOUS ONCE
Status: COMPLETED | OUTPATIENT
Start: 2022-10-21 | End: 2022-10-21

## 2022-10-21 RX ORDER — SODIUM CHLORIDE, SODIUM LACTATE, POTASSIUM CHLORIDE, CALCIUM CHLORIDE 600; 310; 30; 20 MG/100ML; MG/100ML; MG/100ML; MG/100ML
INJECTION, SOLUTION INTRAVENOUS CONTINUOUS
Status: DISCONTINUED | OUTPATIENT
Start: 2022-10-21 | End: 2022-10-21 | Stop reason: HOSPADM

## 2022-10-21 RX ORDER — FENTANYL CITRATE 50 UG/ML
INJECTION, SOLUTION INTRAMUSCULAR; INTRAVENOUS PRN
Status: DISCONTINUED | OUTPATIENT
Start: 2022-10-21 | End: 2022-10-21 | Stop reason: SDUPTHER

## 2022-10-21 RX ORDER — FENTANYL CITRATE 50 UG/ML
100 INJECTION, SOLUTION INTRAMUSCULAR; INTRAVENOUS ONCE
Status: COMPLETED | OUTPATIENT
Start: 2022-10-21 | End: 2022-10-21

## 2022-10-21 RX ORDER — ONDANSETRON 2 MG/ML
4 INJECTION INTRAMUSCULAR; INTRAVENOUS
Status: CANCELLED | OUTPATIENT
Start: 2022-10-21 | End: 2022-10-22

## 2022-10-21 RX ORDER — LIDOCAINE HYDROCHLORIDE 10 MG/ML
0.5 INJECTION, SOLUTION EPIDURAL; INFILTRATION; INTRACAUDAL; PERINEURAL ONCE
Status: DISCONTINUED | OUTPATIENT
Start: 2022-10-21 | End: 2022-10-21 | Stop reason: HOSPADM

## 2022-10-21 RX ORDER — PROCHLORPERAZINE EDISYLATE 5 MG/ML
5 INJECTION INTRAMUSCULAR; INTRAVENOUS
Status: CANCELLED | OUTPATIENT
Start: 2022-10-21 | End: 2022-10-22

## 2022-10-21 RX ORDER — PROPOFOL 10 MG/ML
INJECTION, EMULSION INTRAVENOUS PRN
Status: DISCONTINUED | OUTPATIENT
Start: 2022-10-21 | End: 2022-10-21 | Stop reason: SDUPTHER

## 2022-10-21 RX ORDER — LIDOCAINE HYDROCHLORIDE 20 MG/ML
INJECTION, SOLUTION EPIDURAL; INFILTRATION; INTRACAUDAL; PERINEURAL PRN
Status: DISCONTINUED | OUTPATIENT
Start: 2022-10-21 | End: 2022-10-21 | Stop reason: SDUPTHER

## 2022-10-21 RX ORDER — SUCCINYLCHOLINE/SOD CL,ISO/PF 200MG/10ML
SYRINGE (ML) INTRAVENOUS PRN
Status: DISCONTINUED | OUTPATIENT
Start: 2022-10-21 | End: 2022-10-21 | Stop reason: SDUPTHER

## 2022-10-21 RX ORDER — MIDAZOLAM HYDROCHLORIDE 1 MG/ML
INJECTION INTRAMUSCULAR; INTRAVENOUS PRN
Status: DISCONTINUED | OUTPATIENT
Start: 2022-10-21 | End: 2022-10-21 | Stop reason: SDUPTHER

## 2022-10-21 RX ORDER — ONDANSETRON 2 MG/ML
INJECTION INTRAMUSCULAR; INTRAVENOUS PRN
Status: DISCONTINUED | OUTPATIENT
Start: 2022-10-21 | End: 2022-10-21 | Stop reason: SDUPTHER

## 2022-10-21 RX ORDER — FENTANYL CITRATE 50 UG/ML
25 INJECTION, SOLUTION INTRAMUSCULAR; INTRAVENOUS EVERY 5 MIN PRN
Status: CANCELLED | OUTPATIENT
Start: 2022-10-21

## 2022-10-21 RX ORDER — DEXAMETHASONE SODIUM PHOSPHATE 4 MG/ML
INJECTION, SOLUTION INTRA-ARTICULAR; INTRALESIONAL; INTRAMUSCULAR; INTRAVENOUS; SOFT TISSUE PRN
Status: DISCONTINUED | OUTPATIENT
Start: 2022-10-21 | End: 2022-10-21 | Stop reason: SDUPTHER

## 2022-10-21 RX ORDER — HYDROMORPHONE HCL 110MG/55ML
0.5 PATIENT CONTROLLED ANALGESIA SYRINGE INTRAVENOUS EVERY 5 MIN PRN
Status: CANCELLED | OUTPATIENT
Start: 2022-10-21

## 2022-10-21 RX ORDER — OXYCODONE HYDROCHLORIDE 5 MG/1
5 TABLET ORAL
Status: CANCELLED | OUTPATIENT
Start: 2022-10-21 | End: 2022-10-22

## 2022-10-21 RX ADMIN — BUPIVACAINE HYDROCHLORIDE 25 ML: 5 INJECTION, SOLUTION EPIDURAL; INTRACAUDAL at 11:04

## 2022-10-21 RX ADMIN — DEXAMETHASONE SODIUM PHOSPHATE 8 MG: 4 INJECTION, SOLUTION INTRAMUSCULAR; INTRAVENOUS at 11:58

## 2022-10-21 RX ADMIN — ONDANSETRON 4 MG: 2 INJECTION INTRAMUSCULAR; INTRAVENOUS at 12:44

## 2022-10-21 RX ADMIN — CEFAZOLIN 2000 MG: 2 INJECTION, POWDER, FOR SOLUTION INTRAMUSCULAR; INTRAVENOUS at 11:34

## 2022-10-21 RX ADMIN — LIDOCAINE HYDROCHLORIDE 100 MG: 20 INJECTION, SOLUTION EPIDURAL; INFILTRATION; INTRACAUDAL; PERINEURAL at 11:44

## 2022-10-21 RX ADMIN — MIDAZOLAM HYDROCHLORIDE 1 MG: 1 INJECTION, SOLUTION INTRAMUSCULAR; INTRAVENOUS at 10:57

## 2022-10-21 RX ADMIN — FENTANYL CITRATE 50 MCG: 50 INJECTION, SOLUTION INTRAMUSCULAR; INTRAVENOUS at 12:16

## 2022-10-21 RX ADMIN — TRANEXAMIC ACID 1000 MG: 1 INJECTION, SOLUTION INTRAVENOUS at 11:46

## 2022-10-21 RX ADMIN — FENTANYL CITRATE 50 MCG: 50 INJECTION, SOLUTION INTRAMUSCULAR; INTRAVENOUS at 11:44

## 2022-10-21 RX ADMIN — MIDAZOLAM 2 MG: 1 INJECTION INTRAMUSCULAR; INTRAVENOUS at 11:41

## 2022-10-21 RX ADMIN — FENTANYL CITRATE 50 MCG: 50 INJECTION, SOLUTION INTRAMUSCULAR; INTRAVENOUS at 10:57

## 2022-10-21 RX ADMIN — SODIUM CHLORIDE, POTASSIUM CHLORIDE, SODIUM LACTATE AND CALCIUM CHLORIDE: 600; 310; 30; 20 INJECTION, SOLUTION INTRAVENOUS at 10:25

## 2022-10-21 RX ADMIN — Medication 100 MG: at 11:45

## 2022-10-21 RX ADMIN — PROPOFOL 200 MG: 10 INJECTION, EMULSION INTRAVENOUS at 11:44

## 2022-10-21 ASSESSMENT — PAIN SCALES - GENERAL
PAINLEVEL_OUTOF10: 0
PAINLEVEL_OUTOF10: 1
PAINLEVEL_OUTOF10: 0

## 2022-10-21 ASSESSMENT — PAIN DESCRIPTION - PAIN TYPE: TYPE: ACUTE PAIN

## 2022-10-21 ASSESSMENT — PAIN DESCRIPTION - LOCATION: LOCATION: SHOULDER

## 2022-10-21 ASSESSMENT — PAIN DESCRIPTION - ORIENTATION: ORIENTATION: LEFT

## 2022-10-21 ASSESSMENT — PAIN DESCRIPTION - FREQUENCY: FREQUENCY: CONTINUOUS

## 2022-10-21 ASSESSMENT — PAIN DESCRIPTION - DESCRIPTORS: DESCRIPTORS: ACHING

## 2022-10-21 ASSESSMENT — ENCOUNTER SYMPTOMS: SHORTNESS OF BREATH: 0

## 2022-10-21 NOTE — ANESTHESIA POSTPROCEDURE EVALUATION
Department of Anesthesiology  Postprocedure Note    Patient: Felicitas Wilson  MRN: [id number]  YOB: 1985  Date of evaluation: 10/21/2022      Procedure Summary     Date: 10/21/22 Room / Location: [address]    Anesthesia Start: 1139 Anesthesia Stop: 9028    Procedure: LEFT SHOULDER ARTHROSCOPY; DISTAL CLAVICLE EXCISION-BLOCK (Left: Shoulder) Diagnosis:       Arthritis of left acromioclavicular joint      (Arthritis of left acromioclavicular joint [M19.012])    Surgeons: Alberto Castellano MD Responsible Provider: Fina Ahuja MD    Anesthesia Type: general, regional ASA Status: 3          Anesthesia Type: No value filed.     Abiola Phase I: Abiola Score: 8    Abiola Phase II:        Anesthesia Post Evaluation    Patient location during evaluation: PACU  Patient participation: complete - patient participated  Level of consciousness: awake and alert  Airway patency: patent  Nausea & Vomiting: no nausea and no vomiting  Complications: no  Cardiovascular status: hemodynamically stable  Respiratory status: acceptable  Hydration status: stable  Multimodal analgesia pain management approach

## 2022-10-21 NOTE — ANESTHESIA PRE PROCEDURE
Department of Anesthesiology  Preprocedure Note       Name:  Ariel Wall   Age:  30 y.o.  :  1985                                          MRN:  [id number]         Date:  10/21/2022      Surgeon: Lavell Iglesias):  Teresita Meredith MD    Procedure: Procedure(s):  LEFT SHOULDER ARTHROSCOPY; DISTAL CLAVICLE EXCISION-BLOCK    Medications prior to admission:   Prior to Admission medications    Medication Sig Start Date End Date Taking? Authorizing Provider   ondansetron (ZOFRAN) 4 MG tablet Take 1 tablet by mouth every 8 hours as needed for Nausea or Vomiting  Patient not taking: Reported on 10/21/2022 10/20/22 10/27/22  Teresita Meredith MD   aspirin 325 MG EC tablet Take 1 tablet by mouth daily for 14 days 10/20/22 11/3/22  Teresita Meredith MD   HYDROcodone-acetaminophen (NORCO)  MG per tablet Take 1 tablet by mouth every 4 hours as needed for Pain for up to 30 doses.  Intended supply: 7 days 10/20/22 10/27/22  Teresita Meredith MD   levonorgestrel (MIRENA, 52 MG,) IUD 52 mg 1 each by IntraUTERine route 10/11/22   RUPERTO Macedo CNP   omeprazole (PRILOSEC) 40 MG delayed release capsule Take 1 capsule by mouth Daily TAKE 1 CAPSULE DAILY IN THE MORNING 10/11/22 4/9/23  RUPERTO Macedo CNP   levothyroxine (SYNTHROID) 25 MCG tablet Take 1 tablet by mouth Daily Take 1 tablet by mouth once daily 22  RUPERTO Macedo CNP   amphetamine-dextroamphetamine (ADDERALL) 20 MG tablet TAKE 1 TABLET BY MOUTH THREE TIMES DAILY 3/16/22   Historical Provider, MD   busPIRone (BUSPAR) 30 MG tablet  3/16/22   Historical Provider, MD   acetaminophen (TYLENOL) 325 MG tablet Take 650 mg by mouth as needed for Pain    Historical Provider, MD       Current medications:    Current Facility-Administered Medications   Medication Dose Route Frequency Provider Last Rate Last Admin    lidocaine PF 1 % injection 0.5 mL  0.5 mL SubCUTAneous Once Teresita Meredith MD        lactated ringers infusion   IntraVENous Continuous Bella Russo MD 50 mL/hr at 10/21/22 1025 New Bag at 10/21/22 1025    ceFAZolin (ANCEF) 2,000 mg in dextrose 5 % 50 mL IVPB (mini-bag)  2,000 mg IntraVENous On Call to 1501 Anastacio Garcia MD        tranexamic acid (CYKLOKAPRON) 1,000 mg in sodium chloride 0.9 % 60 mL IVPB  1,000 mg IntraVENous On Call to 1501 Anastacio Garcia MD        tranexamic acid (CYKLOKAPRON) 1,000 mg in sodium chloride 0.9 % 60 mL IVPB  1,000 mg IntraVENous Once Bella Russo MD           Allergies:  No Known Allergies    Problem List:    Patient Active Problem List   Diagnosis Code    Hypothyroidism E03.9    Prediabetes R73.03    Severe dysplasia of cervix (DAVE III) D06.9    PCOS (polycystic ovarian syndrome) E28.2    Sleep apnea, obstructive G47.33    Back pain M54.9    Hyperlipidemia, mixed E78.2    S/P laparoscopic sleeve gastrectomy Z98.84    Chronic GERD K21.9    Class 3 severe obesity due to excess calories with serious comorbidity and body mass index (BMI) of 40.0 to 44.9 in adult ([facility] [address] E66.01, Z68.41    Depression with anxiety F41.8    Attention deficit hyperactivity disorder (ADHD), predominantly inattentive type F90.0       Past Medical History:        Diagnosis Date    Abnormal Pap smear of cervix     prior LEEP.  Arthritis     upper cervical area pain    Carpal tunnel syndrome of right wrist 5/10/2017    Gestational diabetes     pre-diabetic--not medically treated    Hypothyroidism     Iron deficiency anemia     Obesity, unspecified     Pancreatitis     PCOS (polycystic ovarian syndrome)     Sleep apnea     requires CPAP while sleeping.        Past Surgical History:        Procedure Laterality Date    CARPAL TUNNEL RELEASE Right 2017    Right carpal tunnel release       SECTION  2016    CHOLECYSTECTOMY, LAPAROSCOPIC  2010    [facility] LEEP      [place]    SINUS SURGERY      SLEEVE GASTRECTOMY  2017    laparoscopic - Dr. Ashley Perrin Bilateral     WISDOM TOOTH EXTRACTION         Social History:    Social History     Tobacco Use    Smoking status: Never    Smokeless tobacco: Never   Substance Use Topics    Alcohol use: Yes     Alcohol/week: 0.0 standard drinks     Comment: 1 drink every 6 months                                Counseling given: Not Answered      Vital Signs (Current):   Vitals:    10/20/22 0954 10/21/22 0955   BP:  (!) 134/92   Pulse:  77   Resp:  18   Temp:  98.2 °F (36.8 °C)   TempSrc:  Temporal   SpO2:  100%   Weight: 246 lb (111.6 kg) 246 lb 9.6 oz (111.9 kg)   Height: 5' 5\" (1.651 m) 5' 5\" (1.651 m)                                              BP Readings from Last 3 Encounters:   10/21/22 (!) 134/92   10/11/22 112/84   04/11/22 120/84       NPO Status: Time of last liquid consumption: 2130                        Time of last solid consumption: 1900                        Date of last liquid consumption: 10/20/22                        Date of last solid food consumption: 10/20/22    BMI:   Wt Readings from Last 3 Encounters:   10/21/22 246 lb 9.6 oz (111.9 kg)   10/11/22 246 lb 6.4 oz (111.8 kg)   10/06/22 225 lb (102.1 kg)     Body mass index is 41.04 kg/m².     CBC:   Lab Results   Component Value Date/Time    WBC 5.7 04/08/2022 11:31 AM    RBC 4.25 04/08/2022 11:31 AM    HGB 12.6 04/08/2022 11:31 AM    HCT 37.3 04/08/2022 11:31 AM    MCV 87.8 04/08/2022 11:31 AM    RDW 12.8 04/08/2022 11:31 AM     04/08/2022 11:31 AM       CMP:   Lab Results   Component Value Date/Time     04/08/2022 11:31 AM    K 4.5 04/08/2022 11:31 AM     04/08/2022 11:31 AM    CO2 23 04/08/2022 11:31 AM    BUN 10 04/08/2022 11:31 AM    CREATININE 0.7 04/08/2022 11:31 AM    GFRAA >60 04/08/2022 11:31 AM    GFRAA >60 07/11/2010 03:03 AM    AGRATIO 1.7 04/08/2022 11:31 AM    LABGLOM >60 04/08/2022 11:31 AM    LABGLOM >60 02/04/2011 04:16 PM    GLUCOSE 83 04/08/2022 11:31 AM    PROT 6.9 04/08/2022 11:31 AM    PROT 6.6 02/04/2011 04:16 PM    CALCIUM 9.2 04/08/2022 11:31 AM    BILITOT 0.8 04/08/2022 11:31 AM    ALKPHOS 36 04/08/2022 11:31 AM    AST 19 04/08/2022 11:31 AM    ALT 27 04/08/2022 11:31 AM       POC Tests: No results for input(s): POCGLU, POCNA, POCK, POCCL, POCBUN, POCHEMO, POCHCT in the last 72 hours. Coags: No results found for: PROTIME, INR, APTT    HCG (If Applicable):   Lab Results   Component Value Date    PREGTESTUR Negative 10/21/2022        ABGs: No results found for: PHART, PO2ART, FEW1VAF, RHP5CEI, BEART, M3CAHLZO     Type & Screen (If Applicable):  No results found for: LABABO, LABRH    Drug/Infectious Status (If Applicable):  No results found for: HIV, HEPCAB    COVID-19 Screening (If Applicable):   Lab Results   Component Value Date/Time    COVID19 Not Detected 07/29/2020 12:08 PM           Anesthesia Evaluation  Patient summary reviewed and Nursing notes reviewed no history of anesthetic complications:   Airway: Mallampati: I  TM distance: >3 FB   Neck ROM: full  Mouth opening: > = 3 FB   Dental: normal exam         Pulmonary:   (+) sleep apnea:      (-) asthma and shortness of breath                           Cardiovascular:        (-) hypertension and  angina                Neuro/Psych:   (+) psychiatric history:depression/anxiety    (-) CVA           GI/Hepatic/Renal:   (+) GERD:, morbid obesity     (-) liver disease       Endo/Other:    (+) DiabetesType II DM, , hypothyroidism::., .                 Abdominal:             Vascular:     - PVD. Other Findings:           Anesthesia Plan      general and regional     ASA 3     (Pt consented for interscalene nerve block for post-operative pain control. Discussed risks/benefits of procedure, including bleeding/infection, nerve injury, LAST. Pt understood and expressed understanding to continue.)  Induction: intravenous. MIPS: Postoperative opioids intended and Prophylactic antiemetics administered. Anesthetic plan and risks discussed with patient.     Use of blood products discussed with patient whom. Plan discussed with CRNA.                     Nestor Sainz MD   10/21/2022

## 2022-10-21 NOTE — ANESTHESIA PROCEDURE NOTES
Peripheral Block    Patient location during procedure: pre-op  Reason for block: post-op pain management and at surgeon's request  Start time: 10/21/2022 11:04 AM  End time: 10/21/2022 11:11 AM  Staffing  Performed: anesthesiologist   Anesthesiologist: Vi Villarreal MD  Preanesthetic Checklist  Completed: patient identified, IV checked, site marked, risks and benefits discussed, surgical/procedural consents, equipment checked, pre-op evaluation, timeout performed, anesthesia consent given, oxygen available and monitors applied/VS acknowledged  Peripheral Block   Patient position: sitting  Prep: ChloraPrep  Provider prep: mask and sterile gloves  Patient monitoring: cardiac monitor, continuous pulse ox, frequent blood pressure checks and IV access  Block type: Brachial plexus  Interscalene  Laterality: left  Injection technique: single-shot  Guidance: nerve stimulator and ultrasound guided  Local infiltration: lidocaine  Infiltration strength: 1 %  Local infiltration: lidocaine  Dose: 3 mL    Needle   Needle type: short-bevel   Needle gauge: 22 G  Needle localization: ultrasound guidance and nerve stimulator  Needle length: 10 cm  Assessment   Injection assessment: negative aspiration for heme, no paresthesia on injection and local visualized surrounding nerve on ultrasound  Paresthesia pain: none  Slow fractionated injection: yes  Hemodynamics: stable  Real-time US image taken/store: yes  Outcomes: uncomplicated and patient tolerated procedure well    Additional Notes  U/S [id number]. (1) Under ultrasound guidance, a 22 gauge needle was inserted and placed in close proximity to the BP nerve. (2) Ultrasound was also used to visualize the spread of the anesthetic in close proximity to the nerve being blocked. (3) The nerve appeared anatomically normal, and (4 there were no apparent abnormal pathological findings on the image that were readily visible and related to the nerve being blocked.  (5) A permanent ultrasound image was saved in the patient's record.  Completed by [facility] student            Medications Administered  bupivacaine (PF) 0.5 % - Perineural   25 mL - 10/21/2022 11:04:00 AM

## 2022-10-21 NOTE — PROGRESS NOTES
Discharge instructions review with patient and . All home medications have been reviewed, pt v/u. Discharge instructions signed. Pt discharged via wheelchair. Pt discharged with discharge paperwork,  picked up prescriptions from outpt pharmacy, and all belongings.  taking stable pt home.

## 2022-10-21 NOTE — PROGRESS NOTES
Nerve block procedure completed. Patient tolerated well. /73. P55. SaO2 100% on O2 at 2L/NC   returned to bedside.

## 2022-10-21 NOTE — PROGRESS NOTES
Pt awake and alert at this time. Pt on RA, and VSS. Pt denies pain and nausea. Skin warm. Pt meets criteria to be discharged from Phase 1.

## 2022-10-21 NOTE — PROGRESS NOTES
Pt arrived from OR to PACU bay 6. Reported received from [address] [place] [address] staff. Pt arousable to voice. Surgical incisions dressings in place to left shoulder. Pt on 3L NC NSR, and VSS. Will continue to monitor.

## 2022-10-25 NOTE — OP NOTE
Operative Note      Patient: Garth Stoner  YOB: 1985  MRN: [id number]    Date of Procedure: 10/21/2022    Pre-Op Diagnosis: Arthritis of left acromioclavicular joint [M19.012]    Post-Op Diagnosis: Same       Procedure(s):  LEFT SHOULDER ARTHROSCOPY; DISTAL CLAVICLE EXCISION-BLOCK, subacromial decompression    Surgeon(s):  Yaakov Powers MD    Assistant:   Surgical Assistant: Reynaldo Duque    Anesthesia: General    Estimated Blood Loss (mL): Minimal    Complications: None    Specimens:   * No specimens in log *    Implants:  * No implants in log *      Drains: * No LDAs found *    Findings: per dictation    Detailed Description of Procedure: The patient was met in the preoperative holding area. Informed consent was obtained. Interscalene block was placed by anesthesia. He was taken back to the operative room and transferred to the table. General anesthesia was performed. He was placed in the beachchair position. All bony prominences were well-padded. Bilateral SCDs were placed. Upper extremity was prepped and draped using standard sterile technique. Formal timeout was performed in which the correct patient, surgical site, and procedure was reaffirmed. Preventive antibiotics were given within 30 minutes of skin incision. Initial incision was made just inferior medial to the posterior lateral border of the acromion. Trocar was introduced into the glenohumeral joint. Arthroscope was introduced. Diagnostic arthroscopy was performed. Anterior portal was created via an outside in technique. The subscapularis was intact. The chondral surfaces of the glenoid and humeral head were intact without evidence of damage. Biceps tendon was intact. Remainder of the rotator cuff was intact. There was no evidence of labral tear. Arthroscope was taken to the subacromial space. A lateral as well as anterior lateral and posterior lateral working portals were created.  Bursal tissue was debrided. Bursal surface of the rotator cuff demonstrated no evidence of tearing. We then exposed the acromioclavicular joint. Severe degenerative changes were present. Through the anterior portal using the arthroscopic bur we did perform a distal clavicle excision with resection of the distal 5 to 10 mm. This was followed by a subacromial decompression using an arthroscopic bur. Shoulder was thoroughly irrigated. All instruments were removed. Portal sites were closed using 3-0 nylon in interrupted fashion. Sterile dressings were applied in the form of Xeroform, 4 x 4's, ABD, and foam tape. She was placed into a sling and successfully extubated. Then the procedure all counts were correct and I was present for entire case.     Electronically signed by Latricia Leigh MD on 10/25/2022 at 10:36 AM

## 2022-11-03 ENCOUNTER — OFFICE VISIT (OUTPATIENT)
Dept: ORTHOPEDIC SURGERY | Age: 37
End: 2022-11-03

## 2022-11-03 VITALS — BODY MASS INDEX: 40.98 KG/M2 | HEIGHT: 65 IN | WEIGHT: 246 LBS

## 2022-11-03 DIAGNOSIS — M25.512 LEFT SHOULDER PAIN, UNSPECIFIED CHRONICITY: Primary | ICD-10-CM

## 2022-11-03 PROCEDURE — 99024 POSTOP FOLLOW-UP VISIT: CPT | Performed by: ORTHOPAEDIC SURGERY

## 2022-11-07 NOTE — PROGRESS NOTES
11/3/2022      Reason for visit:  Status post left shoulder arthroscopy with distal clavicle excision and subacromial decompression on 10/21/2022     History of Present Illness:  Patient returns for postop evaluation. Overall she is doing well. She denies fever or chills. No numbness or tingling. She does report some discomfort in the shoulder as well as some decreased range of motion. Objective:     Physical Exam:  The patient is well-appearing and in no apparent distress  Neg Spurling's test  Examination of the left shoulder  There is no swelling, ecchymosis, or gross deformity  There is no evidence of muscle atrophy  neg Aviles test, neg Neers test  neg bicipital groove tenderness, no AC joint tenderness  No pain with gentle motion of shoulder  Intact motor and sensory function throughout the median/radial/ulnar/PIN/AIN distributions  Palpable radial pulse, brisk cap refill, 2+ symmetric reflexes      Imagin view x-rays of the left shoulder were obtained the office today on 11/3/2022 and reviewed. Postoperative changes consistent with distal clavicle excision. There is no fracture or dislocation. Assessment:  Status post left shoulder arthroscopy with distal clavicle excision and subacromial decompression on 10/21/2022     Plan:  Overall the patient is doing well. However I do think that she would benefit from physical therapy. Therefore we will start formal physical therapy. Return in 4 weeks for repeat evaluation. Fay Markham MD            Orthopaedic Surgery Sports Medicine and [address]  and [facility]            Team Physician [facility]        Disclaimer: This note was dictated with voice recognition software. Though review and correction are routine, we apologize for any errors.

## 2022-11-14 ENCOUNTER — HOSPITAL ENCOUNTER (OUTPATIENT)
Dept: PHYSICAL THERAPY | Age: 37
Setting detail: THERAPIES SERIES
Discharge: HOME | End: 2022-11-14
Payer: COMMERCIAL

## 2022-11-14 NOTE — FLOWSHEET NOTE
[address]     Physical Therapy  Cancellation/No-show Note  Patient Name:  Emelyn Alexander  :  1985   Date:  2022  Cancelled visits to date: 1  No-shows to date: 0    Patient status for today's appointment patient:  [x]  Cancelled - eval   []  Rescheduled appointment  []  No-show     Reason given by patient:  []  Patient ill  []  Conflicting appointment  []  No transportation    []  Conflict with work  []  No reason given  [x]  Other:  sick kid   Comments:      Phone call information:   []  Phone call made today to patient at _ time at number provided:      []  Patient answered, conversation as follows:    []  Patient did not answer, message left as follows:  []  Phone call not made today  [x]  Phone call not needed - pt contacted us to cancel and provided reason for cancellation.      Electronically signed by:  Diomedes Diez PT

## 2022-11-15 ENCOUNTER — HOSPITAL ENCOUNTER (OUTPATIENT)
Dept: PHYSICAL THERAPY | Age: 37
Setting detail: THERAPIES SERIES
Discharge: HOME | End: 2022-11-15
Payer: COMMERCIAL

## 2022-11-15 DIAGNOSIS — M25.612 DECREASED RANGE OF MOTION OF LEFT SHOULDER: Primary | ICD-10-CM

## 2022-11-15 DIAGNOSIS — R29.898 DECREASED STRENGTH OF UPPER EXTREMITY: ICD-10-CM

## 2022-11-15 PROCEDURE — 97140 MANUAL THERAPY 1/> REGIONS: CPT

## 2022-11-15 PROCEDURE — 97110 THERAPEUTIC EXERCISES: CPT

## 2022-11-15 PROCEDURE — 97161 PT EVAL LOW COMPLEX 20 MIN: CPT

## 2022-11-15 NOTE — PLAN OF CARE
[address] [address] [facility], [address]  Phone: [phone]   Fax: [phone]                                                     Physical Therapy Certification    Dear Torsten Dias MD  ,    We had the pleasure of evaluating the following patient for physical therapy services at [address] [place] [address]. A summary of our findings can be found in the initial assessment below. This includes our plan of care. If you have any questions or concerns regarding these findings, please do not hesitate to contact me at the office phone number checked above. Thank you for the referral.       Physician Signature:_______________________________Date:__________________  By signing above (or electronic signature), therapists plan is approved by physician      Patient: Dipika Card   : 1985   MRN: [id number]  Referring Physician: Torsten Dias MD        Evaluation Date: 11/15/2022      Medical Diagnosis Information:  Left shoulder pain, unspecified chronicity [M25.512]   PT diagnosis:     ICD-10-CM    1. Decreased range of motion of left shoulder  M25.612       2. Decreased strength of upper extremity  R29.898                                               Insurance information: PT Insurance Information: [facility]    Precautions/ Contra-indications: 10/17 subacromial decompression, distal clavicle excision. Shoulder arhroscopy   Latex Allergy:  [x]NO      []YES  Preferred Language for Healthcare:   [x]English       []Other:    C-SSRS Triggered by Intake questionnaire (Past 2 wk assessment ):   [x] No, Questionnaire did not trigger screening.   [] Yes, Patient intake triggered C-SSRS Screening     [] Completed, no further action required. [] Completed, PCP notified via Epic    SUBJECTIVE: Patient stated complaint: Pt had been receiving PT for left shoulder for about 5 years.  Has had multiple steroid injections but pain continued. Shoulder arthroscopy 10/17. Mainly has pain anterior shoulder, especially with overhead reaching and abduction. She had been having pain in arm as well. Original problem was pain trying to lift a trailer hitch. Is wondering if this pain is still from original problem or from the surgery. Relevant Medical History: Injections to left shoulder. Functional Outcome: FOTO physical FS primary measure score = 45; Risk adjusted = 47    Pain Scale: 1-2, 1-9/10  Easing factors: Ice, has stopped pain meds, tylenol  Provocative factors: Donning/doffing bra, washing hair, reaching behind back to bathe, reaching behind back to adjust pillow. Pushing, squeezing     Type: [x]Constant   []Intermittent  []Radiating []Localized []other:     Numbness/Tingling: Sometimes (ulnar nerve distribution prior to surgery as well). Driving with left arm will get numbness into ulnar distribution    Occupation/School: Previously full time, currently housework    Living Status/Prior Level of Function: Prior to this injury / incident, pt was independent with ADLs and IADLs,       OBJECTIVE:   Hand dominance: right    Palpation: TTP, Left UT, BL Rhomboids and middle trap. Functional Mobility/Transfers: symmetrical IR and ER but with pain. States unable to do reps. Posture: Rounded shoulders, forward  head.  Left shoulder elevation    Bandages/Dressings/Incisions: None      Dermatomes Normal Abnormal Comments   Top of head (C1) x     Posterior occipital region (C2) x     Side of neck (C3) x     Top of shoulder (C4) x     Lateral deltoid (C5) x     Tip of thumb (C6) x     Distal middle finger (C7) x     Distal fifth finger (C8) x     Medial forearm (T1) x         Reflexes Normal Abnormal Comments   C5-6 Biceps x     C5-6 Brachioradialis      C7-8 Triceps      Ramoss      Clonus          Cervical AROM screen: [x] WFL  [] abnormal:     PROM AROM    L R L R   Cervical Flexion        Cervical Extension        Cervical Rotation   60 70   Cervical Side-bend   Limited Limited   Shoulder Flexion    155 160   Shoulder Abduction    160 160   Shoulder External Rotation    Functional Symmetrical Functional symmetrical   Shoulder Internal Rotation    Functional Symmetrical Functional Symmetrical   Elbow Flexion        Elbow Extension        Pronation        Supination        Wrist Flexion        Wrist Extension        Radial Deviation        Ulnar Deviation            Strength (0-5) Left Right    Shoulder Shrug (C4) 5 5   Shoulder Flex 4 4+   Shoulder Abd (C5) 4 5   Shoulder ER 5 5   Shoulder IR 5 5   Biceps (C6) 5 5   Triceps (C7) 5 5   Lats     Middle Trap     Rhomboids     Lower Trap      Pronation      Supination      Wrist Flex (C7)     Wrist Ext (C6)     Wrist Radial Deviation     Wrist Ulnar Deviation                    Joint mobility:    []Normal    [x]Hypo   []Hyper      Orthopaedic Special Tests Positive  Negative  NT Comments    Shoulder        Neer        Antonio-Aviles       Empty Can       Drop Arm       [place]'s       Speeds        Sulcus        Apprehension (Anterior / Posterior)       Load and Shift              Elbow        Cozen's       Varus Stress       Valgus Stress        Tinel's                                                       [x] Patient history, allergies, meds reviewed. Medical chart reviewed. See intake form. Review Of Systems (ROS):  [x]Performed Review of systems (Integumentary, CardioPulmonary, Neurological) by intake and observation. Intake form has been scanned into medical record. Patient has been instructed to contact their primary care physician regarding ROS issues if not already being addressed at this time.       Co-morbidities/Complexities (which will affect course of rehabilitation):   []None        []Hx of COVID   Arthritic conditions   []Rheumatoid arthritis (M05.9)  [x]Osteoarthritis (M19.91)  []Gout   Cardiovascular conditions   []Hypertension (I10)  []Hyperlipidemia (E78.5)  []Angina pectoris (I20)  []Atherosclerosis (I70)  []Pacemaker  []Hx of CABG/stent/  cardiac surgeries   Musculoskeletal conditions   []Disc pathology   []Congenital spine pathologies   []Osteoporosis (M81.8)  []Osteopenia (M85.8)  []Scoliosis       Endocrine conditions   []Hypothyroid (E03.9)  []Hyperthyroid Gastrointestinal conditions   []Constipation (X14.99)   Metabolic conditions   []Morbid obesity (E66.01)  []Diabetes type 1(E10.65) or 2 (E11.65)   []Neuropathy (G60.9)     Cardio/Pulmonary conditions   []Asthma (J45)  []Coughing   []COPD (J44.9)  []CHF  []A-fib   Psychological Disorders  [x]Anxiety (F41.9)  [x]Depression (F32.9)   []Other:   Developmental Disorders  []Autism (F84.0)  []CP (G80)  []Down Syndrome (Q90.9)  []Developmental delay     Neurological conditions  []Prior Stroke (I69.30)  []Parkinson's (G20)  []Encephalopathy (G93.40)  []MS (G35)  []Post-polio (G14)  []SCI  []TBI  []ALS Other conditions  []Fibromyalgia (M79.7)  []Vertigo  []Syncope  []Kidney Failure  []Cancer      []currently undergoing                treatment  []Pregnancy  []Incontinence   Prior surgeries  []involved limb  []previous spinal surgery  [] section birth  []hysterectomy  []bowel / bladder surgery  []other relevant surgeries   []Other:              Barriers to/and or personal factors that will affect rehab potential:              [x]Age  [x]Sex    []Smoker              []Motivation/Lack of Motivation                        []Co-Morbidities              []Cognitive Function, education/learning barriers              []Environmental, home barriers              []profession/work barriers  []past PT/medical experience  []other:  Justification:      Falls Risk Assessment (30 days):   [x] Falls Risk assessed and no intervention required.   [] Falls Risk assessed and Patient requires intervention due to being higher risk   TUG score (>12s at risk):     [] Falls education provided, including       ASSESSMENT: Elda Huynh is a 31 y/o Female who presents to the clinic approximately 4 weeks s/p left shoulder arthroplasty with subacromial decompression and distal clavicle excision. The patient complains of limitations including pain with OH reaching, abduction, flexion. Physical therapy evaluation revealed limitations in Shoulder flexion ROM, GHJ joint accessory motion that are limiting the patients ability to function in overhead activities, further limiting their ability to participate in ADLs, donning/doffing bra. Overall, physical therapy evaluation is consistent with referring diagnosis. The patient will benefit from skilled physical therapy interventions including manual interventions for inferior and posterior GHJ mobilizations as these improved pain and shoulder ROM by 5 degrees this date alone, progressive posterior chain strengthening and endurance to reduce anterior shoulder stress in order to improve functional and participation restrictions. This session focused on improving HEP, understanding of shoulder, cervical ROM. Plan to progress mobs and postural retraining.     Functional Impairments   [x]Noted spinal or UE joint hypomobility   []Noted spinal or UE joint hypermobility   [x]Decreased UE functional ROM   [x]Decreased UE functional strength   []Abnormal reflexes/sensation/myotomal/dermatomal deficits   [x]Decreased RC/scapular/core strength and neuromuscular control   []other:      Functional Activity Limitations (from functional questionnaire and intake)   []Reduced ability to tolerate prolonged functional positions   [x]Reduced ability or difficulty with changes of positions or transfers between positions   [x]Reduced ability to maintain good posture and demonstrate good body mechanics with sitting, bending, and lifting   [x] Reduced ability or tolerance with driving and/or computer work   [x]Reduced ability to sleep   [x]Reduced ability to perform lifting, reaching, carrying tasks   []Reduced ability to tolerate impact through UE   [x]Reduced ability to reach behind back   []Reduced ability to  or hold objects   []Reduced ability to throw or toss an object   []other:    Participation Restrictions   [x]Reduced participation in self care activities   [x]Reduced participation in home management activities   []Reduced participation in work activities   []Reduced participation in social activities. []Reduced participation in sport/recreation activities. Classification:   [x]Signs/symptoms consistent with post-surgical status including decreased ROM, strength and function.   []Signs/symptoms consistent with joint sprain/strain   []Signs/symptoms consistent with shoulder impingement   []Signs/symptoms consistent with shoulder/elbow/wrist tendinopathy   []Signs/symptoms consistent with Rotator cuff tear   []Signs/symptoms consistent with labral tear   []Signs/symptoms consistent with postural dysfunction    []Signs/symptoms consistent with Glenohumeral IR Deficit - <45 degrees   []Signs/symptoms consistent with facet dysfunction of cervical/thoracic spine    []Signs/symptoms consistent with pathology which may benefit from Dry needling     []other:     Prognosis/Rehab Potential:      [x]Excellent   []Good    []Fair   []Poor    Tolerance of evaluation/treatment:    [x]Excellent   []Good    []Fair   []Poor    Physical Therapy Evaluation Complexity Justification  [x] A history of present problem with:  [x] no personal factors and/or comorbidities that impact the plan of care;  []1-2 personal factors and/or comorbidities that impact the plan of care  []3 personal factors and/or comorbidities that impact the plan of care  [x] An examination of body systems using standardized tests and measures addressing any of the following: body structures and functions (impairments), activity limitations, and/or participation restrictions;:  [x] a total of 1-2 or more elements   [] a total of 3 or more elements   [] a total of 4 or more elements   [x] A clinical presentation with:  [x] stable and/or uncomplicated characteristics   [] evolving clinical presentation with changing characteristics  [] unstable and unpredictable characteristics;   [x] Clinical decision making of [x] low, [] moderate, [] high complexity using standardized patient assessment instrument and/or measurable assessment of functional outcome. [] EVAL (LOW) 11456 (typically 15 minutes face-to-face)  [] EVAL (MOD) 67347 (typically 30 minutes face-to-face)  [] EVAL (HIGH) 30687 (typically 45 minutes face-to-face)  [] RE-EVAL     PLAN:  Frequency/Duration:  1-2 days per week for 8 Weeks:  INTERVENTIONS:  [x] Therapeutic exercise including: strength training, ROM, for Upper extremity and core   [x]  NMR activation and proprioception for UE, scap and Core   [x] Manual therapy as indicated for shoulder, scapula and spine to include: Dry Needling/IASTM, STM, PROM, Gr I-IV mobilizations, manipulation. [x] Modalities as needed that may include: thermal agents, E-stim, Biofeedback, US, iontophoresis as indicated  [x] Patient education on joint protection, postural re-education, activity modification, progression of HEP. HEP instruction: Written HEP instructions provided and reviewed   Access Code: [email or web address]  URL: [email or web address]  Date: 11/15/2022  Prepared by: Evert Pan    Program Notes  [email or web address]    Lay on stomach. Towel under shoulder. Place back of hand and the small of your back and let your elbow hang down. 3 sets of 1 min.       Exercises  Standing Shoulder Abduction AAROM with Dowel - 1 x daily - 7 x weekly - 3 sets - 10 reps  Shoulder Scaption AAROM with Dowel - 1 x daily - 7 x weekly - 3 sets - 10 reps  Scapular Retraction with Resistance - 1 x daily - 5-7 x weekly - 3 sets - 10 reps - 5-10 seconds hold  Standing Shoulder Horizontal Abduction with Anchored Resistance - 1 x daily - 5-7 x weekly - 3 sets - 10 reps - 5-10 seconds hold  Shoulder Flexion with Anterior Anchored Resistance - 1 x daily - 5-7 x weekly - 3 sets - 10 reps - 5-10 seconds hold    GOALS:  Patient stated goal: Reduce pain with self care and reaching  [] Progressing: [] Met: [] Not Met: [] Adjusted    Therapist goals for Patient:   Short Term Goals: To be achieved in: 2 weeks  1. Independent in HEP and progression per patient tolerance, in order to prevent re-injury. [] Progressing: [] Met: [] Not Met: [] Adjusted  2. Patient will have a decrease in pain to facilitate improvement in movement, function, and ADLs as indicated by Functional Deficits. [] Progressing: [] Met: [] Not Met: [] Adjusted    Long Term Goals: To be achieved in: 8 weeks  1. FOTO score of at least 64 to assist with reaching prior level of function. [] Progressing: [] Met: [] Not Met: [] Adjusted  2. Patient will demonstrate increased AROM to 160 shoulder flexion to allow for proper joint functioning as indicated by patients Functional Deficits. [] Progressing: [] Met: [] Not Met: [] Adjusted  3. Patient will demonstrate an increase in left shoulder flexion and abduction Strength to 5/5 to allow for proper functional mobility as indicated by patients Functional Deficits. [] Progressing: [] Met: [] Not Met: [] Adjusted  4. Patient will return to functional activities including placing 3# object onto raised shelf 10 times without increased symptoms or restriction. [] Progressing: [] Met: [] Not Met: [] Adjusted  5.  Patient will return to functional activities including donning/doffing bra without increased symptoms or restrcition.(patient specific functional goal)    [] Progressing: [] Met: [] Not Met: [] Adjusted     Electronically signed by:  Morteza Dykes PT DPT, MS

## 2022-11-15 NOTE — FLOWSHEET NOTE
[address]  Phone: [phone]   Fax: [phone]    Physical Therapy Daily Treatment Note    Date:  11/15/2022     Patient Name:  Jimbo Sesay    :  1985  MRN: [id number]  Medical Diagnosis:  Left shoulder pain, unspecified chronicity [M25.512]  Treatment Diagnosis:     ICD-10-CM    1. Decreased range of motion of left shoulder  M25.612       2. Decreased strength of upper extremity  R29.898          Insurance/Certification information:  PT Insurance Information: [address] [place] [address]  Physician Information:  Renato Reyes MD    Plan of care signed (Y/N): []  Yes [x]  No     Date of Patient follow up with Physician:      Progress Report: []  Yes  [x]  No     Date Range for reporting period:  Beginnin/15/2022  Ending:     Progress report due (10 Rx/or 30 days whichever is less): visit #10 or 15/46/9215 (date)     Recertification due (POC duration/ or 90 days whichever is less): visit #10 or 1/15/2023 (date)     Visit # Insurance Allowable Auth required?  Date Range   1 [place] []  Yes  [x]  No Calendar Year       Latex Allergy:  [x]NO      []YES  Preferred Language for Healthcare:   [x]English       []other:    Functional Scale:           Date assessed:  TO physical FS primary measure score = 45; risk adjusted = 47  11/15/2022     Pain level:  1-9/10     SUBJECTIVE:  See eval    OBJECTIVE: See eval  Observation:   Test measurements:      RESTRICTIONS/PRECAUTIONS: SAD, DCE 10/17/2022    Exercises/Interventions:   Therapeutic Exercise (03588) Resistance / level Sets / Seconds  Reps Notes/Cues          AAROM Flexion  AAROM Scaption  1  1 20  20    Scapular Row Silver 10\" [address] 10\" 15                                                     Therapeutic Activities (26426)                                          Neuromuscular Re-ed (99450)                                          Manual Intervention (03631)       STM UT  5'  Bilateral   Shld patient, for improvements in cervical, scapular, scapulothoracic and UE control with self care, reaching, carrying, lifting, house/yardwork, driving, computer work. Therapeutic Activities:    [] (23019 or 11545) Provided verbal/tactile cueing for activities related to improving balance, coordination, kinesthetic sense, posture, motor skill, proprioception and motor activation to allow for proper function of scapular, scapulothoracic and UE control with self care, carrying, lifting, driving/computer work.      Home Exercise Program:    [x] (68325) Reviewed/Progressed HEP activities related to strengthening, flexibility, endurance, ROM of scapular, scapulothoracic and UE control with self care, reaching, carrying, lifting, house/yardwork, driving/computer work  [] (98874) Reviewed/Progressed HEP activities related to improving balance, coordination, kinesthetic sense, posture, motor skill, proprioception of scapular, scapulothoracic and UE control with self care, reaching, carrying, lifting, house/yardwork, driving/computer work      Manual Treatments:  PROM / STM / Oscillations-Mobs:  G-I, II, III, IV (PA's, Inf., Post.)  [] (89817) Provided manual therapy to mobilize soft tissue/joints of cervical/CT, scapular GHJ and UE for the purpose of modulating pain, promoting relaxation,  increasing ROM, reducing/eliminating soft tissue swelling/inflammation/restriction, improving soft tissue extensibility and allowing for proper ROM for normal function with self care, reaching, carrying, lifting, house/yardwork, driving/computer work      Charges:  Timed Code Treatment Minutes: 28   Total Treatment Minutes: 50       [x] EVAL - LOW (69065)   [] EVAL - MOD (44757)  [] EVAL - HIGH (08408)  [] RE-EVAL (84811)  [x] NN(50357) x   1    [] Ionto  [] NMR (52693) x       [] Vaso  [x] Manual (66513) x   1    [] Ultrasound  [] TA x        [] Mech Traction (92676)  [] Aquatic Therapy x     [] ES (un) (77810):   [] Home Management Training x  [] ES(attended) (25949)   [] Dry Needling 1-2 muscles (59688):  [] Dry Needling 3+ muscles (855416  [] Group:      [] Other:                    GOALS:  Patient stated goal: Reduce pain with self care and reaching  [] Progressing: [] Met: [] Not Met: [] Adjusted    Therapist goals for Patient:   Short Term Goals: To be achieved in: 2 weeks  1. Independent in HEP and progression per patient tolerance, in order to prevent re-injury. [] Progressing: [] Met: [] Not Met: [] Adjusted  2. Patient will have a decrease in pain to facilitate improvement in movement, function, and ADLs as indicated by Functional Deficits. [] Progressing: [] Met: [] Not Met: [] Adjusted    Long Term Goals: To be achieved in: 8 weeks  1. FOTO score of at least 64 to assist with reaching prior level of function. [] Progressing: [] Met: [] Not Met: [] Adjusted  2. Patient will demonstrate increased AROM to 160 shoulder flexion to allow for proper joint functioning as indicated by patients Functional Deficits. [] Progressing: [] Met: [] Not Met: [] Adjusted  3. Patient will demonstrate an increase in left shoulder flexion and abduction Strength to 5/5 to allow for proper functional mobility as indicated by patients Functional Deficits. [] Progressing: [] Met: [] Not Met: [] Adjusted  4. Patient will return to functional activities including placing 3# object onto raised shelf 10 times without increased symptoms or restriction. [] Progressing: [] Met: [] Not Met: [] Adjusted  5. Patient will return to functional activities including donning/doffing bra without increased symptoms or restrcition.(patient specific functional goal)    [] Progressing: [] Met: [] Not Met: [] Adjusted    Overall Progression Towards Functional goals/ Treatment Progress Update:  [] Patient is progressing as expected towards functional goals listed. [] Progression is slowed due to complexities/Impairments listed.   [] Progression has been slowed due to co-morbidities. [x] Plan just implemented, too soon to assess goals progression <30days   [] Goals require adjustment due to lack of progress  [] Patient is not progressing as expected and requires additional follow up with physician  [] Other    Persisting Functional Limitations/Impairments:  []Sitting []Standing   []Transfers  []Sleeping   []Reaching []Lifting   []ADLs []Housework  []Driving []Job related tasks  []Sports/Recreation []Other:    ASSESSMENT:  See eval  Treatment/Activity Tolerance:  [] Pt able to complete treatment [] Patient limited by fatique  [] Patient limited by pain  [] Patient limited by other medical complications  [] Other:     Prognosis:  [x] Good [] Fair  [] Poor    Patient Requires Follow-up: [x] Yes  [] No    Return to Play:    [x]  N/A     []  Stage 1: Intro to Strength   []  Stage 2: Dynamic Strength and Intro to Plyometrics   []  Stage 3: Advanced Plyometrics and Intro to Throwing   []  Stage 4: Sport specific Training/Return to Sport     []  Ready to Return to Play, [employer]   []  Not Ready for Return to Sports   Comments:      PLAN: See eval. PT 1-2x / week for 8 weeks. [] Continue per plan of care [] Alter current plan (see comments)  [x] Plan of care initiated [] Hold pending MD visit [] Discharge    Electronically signed by: Yolanda Carney, PT, DPT, MS      Note: If patient does not return for scheduled/ recommended follow up visits, this note will serve as a discharge from care along with most recent update on progress.

## 2022-11-17 ENCOUNTER — HOSPITAL ENCOUNTER (OUTPATIENT)
Dept: PHYSICAL THERAPY | Age: 37
Setting detail: THERAPIES SERIES
Discharge: HOME | End: 2022-11-17
Payer: COMMERCIAL

## 2022-11-17 PROCEDURE — 97110 THERAPEUTIC EXERCISES: CPT | Performed by: SPECIALIST/TECHNOLOGIST

## 2022-11-17 PROCEDURE — 97140 MANUAL THERAPY 1/> REGIONS: CPT | Performed by: SPECIALIST/TECHNOLOGIST

## 2022-11-17 NOTE — FLOWSHEET NOTE
[address]  Phone: [phone]   Fax: [phone]    Physical Therapy Daily Treatment Note    Date:  2022     Patient Name:  Megan Scott    :  1985  MRN: [id number]  Medical Diagnosis:  Left shoulder pain, unspecified chronicity [M25.512]  Treatment Diagnosis:     ICD-10-CM    1. Decreased range of motion of left shoulder  M25.612       2. Decreased strength of upper extremity  R29.898          Insurance/Certification information:  PT Insurance Information: [facility]  Physician Information:  Tiffany Schwartz MD    Plan of care signed (Y/N): []  Yes [x]  No     Date of Patient follow up with Physician:      Progress Report: []  Yes  [x]  No     Date Range for reporting period:  Beginnin2022  Ending:     Progress report due (10 Rx/or 30 days whichever is less): visit #10 or  (date)     Recertification due (POC duration/ or 90 days whichever is less): visit #10 or 1/15/2023 (date)     Visit # Insurance Allowable Auth required? Date Range   2 [place] []  Yes  [x]  No Calendar Year       Latex Allergy:  [x]NO      []YES  Preferred Language for Healthcare:   [x]English       []other:    Functional Scale:           Date assessed:  TO physical FS primary measure score = 45; risk adjusted = 47  11/15/2022     Pain level:  1-910     SUBJECTIVE: 4 weeks s/p \"Felt good after last session\" pt reports having mid back pain and weakness. She ant to get more activiry tolerance in her back and shoulder. OBJECTIVE:    Pt fatigued quickly. Has good ROM in all planes but has pain at 100 degrees of flexion but pain ends once past 100 degrees. Observed portals, completely closed and healing well and started scar massage.   Observation:   Test measurements:      RESTRICTIONS/PRECAUTIONS: JODI TERRY 10/17/2022    Exercises/Interventions: 48'  Therapeutic Exercise (45918)  26' Resistance / level Sets / Seconds  Reps Notes/Cues AAROM Flexion  AAROM Scaption  2/5\"  2 15  15 11/17 11/17   Scapular Row [place] 2 15 11/17   Lower Trap Y [place] 2 15 11/17   Standing ER w/ band orange 2 15x 11/17   B UT stretch  30\" 2 11/17                                      Therapeutic Activities (17429)                                          Neuromuscular Re-ed (30439)                                          Manual Intervention (01695)  22'       STM UT  5'  11/17 Bilateral   Shld /GH Mobs  5' 11/17   Post Cap mobs  5' 11/17   T       CT MT/Mobs       PROM MT  5' 11/17   Scar massage portals  2' 11/17       Modalities:     Pt. Education:  -11/17/2022 pt educated on diagnosis, prognosis and expectations for rehab. Also educated on proper posture. The effects of Scar massage with vitamin E oil.   -all pt questions were answered    Home Exercise Program:  HEP instruction: Written HEP instructions provided and reviewed   Access Code: [email or web address]  URL: [email or web address]  Date: 11/15/2022  Prepared by: Salas Nunez    Program Notes  [email or web address]    Lay on stomach. Towel under shoulder. Place back of hand and the small of your back and let your elbow hang down. 3 sets of 1 min.       Exercises  Standing Shoulder Abduction AAROM with Dowel - 1 x daily - 7 x weekly - 3 sets - 10 reps  Shoulder Scaption AAROM with Dowel - 1 x daily - 7 x weekly - 3 sets - 10 reps  Scapular Retraction with Resistance - 1 x daily - 5-7 x weekly - 3 sets - 10 reps - 5-10 seconds hold  Standing Shoulder Horizontal Abduction with Anchored Resistance - 1 x daily - 5-7 x weekly - 3 sets - 10 reps - 5-10 seconds hold  Shoulder Flexion with Anterior Anchored Resistance - 1 x daily - 5-7 x weekly - 3 sets - 10 reps - 5-10 seconds hold         Therapeutic Exercise and NMR EXR  [x] (17548) Provided verbal/tactile cueing for activities related to strengthening, flexibility, endurance, ROM  for improvements in scapular, scapulothoracic and UE control with self care, reaching, carrying, lifting, house/yardwork, driving/computer work.    [] (81451) Provided verbal/tactile cueing for activities related to improving balance, coordination, kinesthetic sense, posture, motor skill, proprioception  to assist with  scapular, scapulothoracic and UE control with self care, reaching, carrying, lifting, house/yardwork, driving/computer work.  [] (67241) Therapist is in constant attendance of 2 or more patients providing skilled therapy interventions, but not providing any significant amount of measurable one-on-one time to either patient, for improvements in cervical, scapular, scapulothoracic and UE control with self care, reaching, carrying, lifting, house/yardwork, driving, computer work. Therapeutic Activities:    [] (08826 or 10093) Provided verbal/tactile cueing for activities related to improving balance, coordination, kinesthetic sense, posture, motor skill, proprioception and motor activation to allow for proper function of scapular, scapulothoracic and UE control with self care, carrying, lifting, driving/computer work.      Home Exercise Program:    [] (75588) Reviewed/Progressed HEP activities related to strengthening, flexibility, endurance, ROM of scapular, scapulothoracic and UE control with self care, reaching, carrying, lifting, house/yardwork, driving/computer work  [] (06750) Reviewed/Progressed HEP activities related to improving balance, coordination, kinesthetic sense, posture, motor skill, proprioception of scapular, scapulothoracic and UE control with self care, reaching, carrying, lifting, house/yardwork, driving/computer work      Manual Treatments:  PROM / STM / Oscillations-Mobs:  G-I, II, III, IV (PA's, Inf., Post.)  [x] (57362) Provided manual therapy to mobilize soft tissue/joints of cervical/CT, scapular GHJ and UE for the purpose of modulating pain, promoting relaxation,  increasing ROM, reducing/eliminating soft tissue swelling/inflammation/restriction, improving soft tissue extensibility and allowing for proper ROM for normal function with self care, reaching, carrying, lifting, house/yardwork, driving/computer work      Charges:  Timed Code Treatment Minutes: 48   Total Treatment Minutes: 48       [] EVAL - LOW (18883)   [] EVAL - MOD (09264)  [] EVAL - HIGH (30852)  [] RE-EVAL (85984)  [x] BQ(67915) x   2    [] Ionto  [] NMR (99498) x       [] Vaso  [x] Manual (44053) x   1    [] Ultrasound  [] TA x        [] Mech Traction (54736)  [] Aquatic Therapy x     [] ES (un) (37792):   [] Home Management Training x  [] ES(attended) (18437)   [] Dry Needling 1-2 muscles (25068):  [] Dry Needling 3+ muscles (394345  [] Group:      [] Other:                    GOALS:  Patient stated goal: Reduce pain with self care and reaching  [x] Progressing: [] Met: [] Not Met: [] Adjusted    Therapist goals for Patient:   Short Term Goals: To be achieved in: 2 weeks  1. Independent in HEP and progression per patient tolerance, in order to prevent re-injury. [x] Progressing: [] Met: [] Not Met: [] Adjusted  2. Patient will have a decrease in pain to facilitate improvement in movement, function, and ADLs as indicated by Functional Deficits. [x] Progressing: [] Met: [] Not Met: [] Adjusted    Long Term Goals: To be achieved in: 8 weeks  1. FOTO score of at least 64 to assist with reaching prior level of function. [] Progressing: [] Met: [] Not Met: [] Adjusted  2. Patient will demonstrate increased AROM to 160 shoulder flexion to allow for proper joint functioning as indicated by patients Functional Deficits. [] Progressing: [] Met: [] Not Met: [] Adjusted  3. Patient will demonstrate an increase in left shoulder flexion and abduction Strength to 5/5 to allow for proper functional mobility as indicated by patients Functional Deficits. [] Progressing: [] Met: [] Not Met: [] Adjusted  4.  Patient will return to functional activities including placing 3# object onto raised shelf 10 times without increased symptoms or restriction. [] Progressing: [] Met: [] Not Met: [] Adjusted  5. Patient will return to functional activities including donning/doffing bra without increased symptoms or restrcition.(patient specific functional goal)    [] Progressing: [] Met: [] Not Met: [] Adjusted    Overall Progression Towards Functional goals/ Treatment Progress Update:  [] Patient is progressing as expected towards functional goals listed. [] Progression is slowed due to complexities/Impairments listed. [] Progression has been slowed due to co-morbidities. [x] Plan just implemented, too soon to assess goals progression <30days   [] Goals require adjustment due to lack of progress  [] Patient is not progressing as expected and requires additional follow up with physician  [] Other    Persisting Functional Limitations/Impairments:  []Sitting []Standing   []Transfers  []Sleeping   []Reaching []Lifting   []ADLs []Housework  []Driving []Job related tasks  []Sports/Recreation []Other:    ASSESSMENT:  Pt tolerated tx session well. PT fatigued rather quickly, complaining of fatigue after first intervention but completed all interventions w/ no complaints of pain. Standing ER, standing UT and Scar massage introduced. PT instructed to begin scar massage at home with vitamin E oil. Cues need to instruct pt to drop and retract her shoulder while performing shoulder rows and lower lat Ys. Pt presented w/ slouched posture with shoulders rolled forward and educated her on appropriate postures with seated positions. Pt educated on the need for shoulder retraction and standing up straight for proper posture especially when on cell phone. She expressed that while standing up straight her mid back fatigues. Continue skilled therapy to increase functional activity tolerance, strength, proprioception and decrease pain, and stiffness to assist w/ functional activities and ADLs.    Treatment/Activity Tolerance:  [x] Pt able to complete treatment [x] Patient limited by fatique  [] Patient limited by pain  [] Patient limited by other medical complications  [] Other:     Prognosis:  [x] Good [] Fair  [] Poor    Patient Requires Follow-up: [x] Yes  [] No    Return to Play:    [x]  N/A     []  Stage 1: Intro to Strength   []  Stage 2: Dynamic Strength and Intro to Plyometrics   []  Stage 3: Advanced Plyometrics and Intro to Throwing   []  Stage 4: Sport specific Training/Return to Sport     []  Ready to Return to Play, [employer]   []  Not Ready for Return to Sports   Comments:      PLAN: PT 1-2x / week for 8 weeks. [x] Continue per plan of care [] Alter current plan (see comments)  [] Plan of care initiated [] Hold pending MD visit [] Discharge    Electronically signed by: Lani Ascencio PTA, [id number]  Therapist was present, directed the patient's care, made skilled judgement, and was responsible for assessment and treatment of the patient. ERMAA, Andrea Monk, YUSUF         Note: If patient does not return for scheduled/ recommended follow up visits, this note will serve as a discharge from care along with most recent update on progress.

## 2022-11-29 ENCOUNTER — HOSPITAL ENCOUNTER (OUTPATIENT)
Dept: PHYSICAL THERAPY | Age: 37
Setting detail: THERAPIES SERIES
Discharge: HOME | End: 2022-11-29
Payer: COMMERCIAL

## 2022-11-29 PROCEDURE — 97110 THERAPEUTIC EXERCISES: CPT

## 2022-11-29 PROCEDURE — 97140 MANUAL THERAPY 1/> REGIONS: CPT

## 2022-11-29 NOTE — FLOWSHEET NOTE
[address]  Phone: [phone]   Fax: [phone]    Physical Therapy Daily Treatment Note    Date:  2022     Patient Name:  Sebastian Main    :  1985  MRN: [id number]  Medical Diagnosis:  Left shoulder pain, unspecified chronicity [M25.512]  Treatment Diagnosis:     ICD-10-CM    1. Decreased range of motion of left shoulder  M25.612       2. Decreased strength of upper extremity  R29.898          Insurance/Certification information:  PT Insurance Information: [facility]  Physician Information:  Haylie Pagan MD    Plan of care signed (Y/N): []  Yes [x]  No     Date of Patient follow up with Physician:      Progress Report: []  Yes  [x]  No     Date Range for reporting period:  Beginnin2022  Ending:     Progress report due (10 Rx/or 30 days whichever is less): visit #10 or  (date)     Recertification due (POC duration/ or 90 days whichever is less): visit #10 or 1/15/2023 (date)     Visit # Insurance Allowable Auth required? Date Range   3 [place] []  Yes  [x]  No Calendar Year       Latex Allergy:  [x]NO      []YES  Preferred Language for Healthcare:   [x]English       []other:    Functional Scale:           Date assessed:  [facility] physical FS primary measure score = 45; risk adjusted = 47  11/15/2022     Pain level:  2/10     SUBJECTIVE: Reports having pain when putting pressure on arm/laying on arm. Moved a bag in scaption yesterday with exacerbation in pain which has resolved this date. OBJECTIVE:   : Catching/pain in scaption at 90 degrees, TTP distal  supraspinatus muscle belly.  Pt fatigued quickly. Has good ROM in all planes but has pain at 100 degrees of flexion but pain ends once past 100 degrees. Observed portals, completely closed and healing well and started scar massage.   Observation:   Test measurements:      RESTRICTIONS/PRECAUTIONS: SAD, DCE 10/17/2022    Exercises/Interventions: 50'  Therapeutic Exercise (40722)  20' Resistance / level Sets / Seconds  Reps Notes/Cues          AAROM Flexion  AAROM Scaption  2/5\"  2 15  15 11/17 11/17   Shoulder Extension 4 Plates 3 12 94/15   Scapular Row 4 plates 3 15 75/73   [facility] 15 11/17   Standing ER w/ band walk out [place] 2 15x 11/29   B UT stretch  30\" 2 11/17   Thoracic Rotations against wall FR 3\" 20 11/29   Thoracic extensions at chair 1/2 FR 3\" 20 11/29   No Money Light tan 2 10 11/29   Standing shoulder internal rotation [place] 3 15 11/29          Therapeutic Activities (05790)                                          Neuromuscular Re-ed (84144)                                          Manual Intervention ([id number])  25'       STM UT  7'  11/29 Bilateral   Shld /GH Mobs  8''  11/29   Post Cap mobs  5'     Thoracic mobilization/manipu  5'  Mid thoracic   CT MT/Mobs       PROM MT  3'  11/17   Scar massage portals  2'  11/17       Modalities:     Pt. Education:  -11/29/2022 pt educated on diagnosis, prognosis and expectations for rehab. Also educated on proper posture. The effects of Scar massage with vitamin E oil.   -all pt questions were answered    Home Exercise Program:  HEP instruction: Written HEP instructions provided and reviewed   Access Code: [email or web address]  URL: [email or web address]  Date: 11/15/2022  Prepared by: Argensi Anderson    Program Notes  [email or web address]    Lay on stomach. Towel under shoulder. Place back of hand and the small of your back and let your elbow hang down. 3 sets of 1 min.       Exercises  Standing Shoulder Abduction AAROM with Dowel - 1 x daily - 7 x weekly - 3 sets - 10 reps  Shoulder Scaption AAROM with Dowel - 1 x daily - 7 x weekly - 3 sets - 10 reps  Scapular Retraction with Resistance - 1 x daily - 5-7 x weekly - 3 sets - 10 reps - 5-10 seconds hold  Standing Shoulder Horizontal Abduction with Anchored Resistance - 1 x daily - 5-7 x weekly - 3 sets - 10 reps - 5-10 seconds hold  Shoulder Flexion with Anterior Anchored Resistance - 1 x daily - 5-7 x weekly - 3 sets - 10 reps - 5-10 seconds hold         Therapeutic Exercise and NMR EXR  [x] (15985) Provided verbal/tactile cueing for activities related to strengthening, flexibility, endurance, ROM  for improvements in scapular, scapulothoracic and UE control with self care, reaching, carrying, lifting, house/yardwork, driving/computer work.    [] (06481) Provided verbal/tactile cueing for activities related to improving balance, coordination, kinesthetic sense, posture, motor skill, proprioception  to assist with  scapular, scapulothoracic and UE control with self care, reaching, carrying, lifting, house/yardwork, driving/computer work.  [] (10033) Therapist is in constant attendance of 2 or more patients providing skilled therapy interventions, but not providing any significant amount of measurable one-on-one time to either patient, for improvements in cervical, scapular, scapulothoracic and UE control with self care, reaching, carrying, lifting, house/yardwork, driving, computer work. Therapeutic Activities:    [] (59814 or 66612) Provided verbal/tactile cueing for activities related to improving balance, coordination, kinesthetic sense, posture, motor skill, proprioception and motor activation to allow for proper function of scapular, scapulothoracic and UE control with self care, carrying, lifting, driving/computer work.      Home Exercise Program:    [] (80644) Reviewed/Progressed HEP activities related to strengthening, flexibility, endurance, ROM of scapular, scapulothoracic and UE control with self care, reaching, carrying, lifting, house/yardwork, driving/computer work  [] (79706) Reviewed/Progressed HEP activities related to improving balance, coordination, kinesthetic sense, posture, motor skill, proprioception of scapular, scapulothoracic and UE control with self care, reaching, carrying, lifting, house/yardwork, driving/computer work Manual Treatments:  PROM / STM / Oscillations-Mobs:  G-I, II, III, IV (PA's, Inf., Post.)  [x] (66833) Provided manual therapy to mobilize soft tissue/joints of cervical/CT, scapular GHJ and UE for the purpose of modulating pain, promoting relaxation,  increasing ROM, reducing/eliminating soft tissue swelling/inflammation/restriction, improving soft tissue extensibility and allowing for proper ROM for normal function with self care, reaching, carrying, lifting, house/yardwork, driving/computer work      Charges:  Timed Code Treatment Minutes: 48   Total Treatment Minutes: 48       [] EVAL - LOW (01218)   [] EVAL - MOD (55461)  [] EVAL - HIGH (38154)  [] RE-EVAL (04240)  [x] PY(53589) x   1    [] Ionto  [] NMR (28793) x       [] Vaso  [x] Manual (43558) x   2    [] Ultrasound  [] TA x        [] Mech Traction (53361)  [] Aquatic Therapy x     [] ES (un) (75980):   [] Home Management Training x  [] ES(attended) (06904)   [] Dry Needling 1-2 muscles (21740):  [] Dry Needling 3+ muscles (169699  [] Group:      [] Other:                    GOALS:  Patient stated goal: Reduce pain with self care and reaching  [x] Progressing: [] Met: [] Not Met: [] Adjusted    Therapist goals for Patient:   Short Term Goals: To be achieved in: 2 weeks  1. Independent in HEP and progression per patient tolerance, in order to prevent re-injury. [x] Progressing: [] Met: [] Not Met: [] Adjusted  2. Patient will have a decrease in pain to facilitate improvement in movement, function, and ADLs as indicated by Functional Deficits. [x] Progressing: [] Met: [] Not Met: [] Adjusted    Long Term Goals: To be achieved in: 8 weeks  1. FOTO score of at least 64 to assist with reaching prior level of function. [] Progressing: [] Met: [] Not Met: [] Adjusted  2. Patient will demonstrate increased AROM to 160 shoulder flexion to allow for proper joint functioning as indicated by patients Functional Deficits.    [] Progressing: [] Met: [] Not Met: [] Adjusted  3. Patient will demonstrate an increase in left shoulder flexion and abduction Strength to 5/5 to allow for proper functional mobility as indicated by patients Functional Deficits. [] Progressing: [] Met: [] Not Met: [] Adjusted  4. Patient will return to functional activities including placing 3# object onto raised shelf 10 times without increased symptoms or restriction. [] Progressing: [] Met: [] Not Met: [] Adjusted  5. Patient will return to functional activities including donning/doffing bra without increased symptoms or restrcition.(patient specific functional goal)    [] Progressing: [] Met: [] Not Met: [] Adjusted    Overall Progression Towards Functional goals/ Treatment Progress Update:  [] Patient is progressing as expected towards functional goals listed. [] Progression is slowed due to complexities/Impairments listed. [] Progression has been slowed due to co-morbidities. [x] Plan just implemented, too soon to assess goals progression <30days   [] Goals require adjustment due to lack of progress  [] Patient is not progressing as expected and requires additional follow up with physician  [] Other    Persisting Functional Limitations/Impairments:  []Sitting []Standing   []Transfers  []Sleeping   []Reaching []Lifting   []ADLs []Housework  []Driving []Job related tasks  []Sports/Recreation []Other:    ASSESSMENT:  Pt tolerated session well. Session focused on introducing thoracic mobility in conjunction with scapular retraction and RTC strengthening. Toya Grant reported increased pain when shoulder was in anterior roll. Edu on maintaining posterior roll throughout. Posterior roll and scapular retraction with decreased pain. Will continue to benefit from skilled PT services to address shoulder pain and ROM progression. Monitor supraspinatus pain.    Treatment/Activity Tolerance:  [x] Pt able to complete treatment [x] Patient limited by fatique  [] Patient limited by pain  [] Patient limited by other medical complications  [] Other:     Prognosis:  [x] Good [] Fair  [] Poor    Patient Requires Follow-up: [x] Yes  [] No    Return to Play:    [x]  N/A     []  Stage 1: Intro to Strength   []  Stage 2: Dynamic Strength and Intro to Plyometrics   []  Stage 3: Advanced Plyometrics and Intro to Throwing   []  Stage 4: Sport specific Training/Return to Sport     []  Ready to Return to Play, [employer]   []  Not Ready for Return to Sports   Comments:      PLAN: PT 1-2x / week for 8 weeks. [x] Continue per plan of care [] Alter current plan (see comments)  [] Plan of care initiated [] Hold pending MD visit [] Discharge    Electronically signed by: Winnifred Galeazzi, PT, DPT, MS        Note: If patient does not return for scheduled/ recommended follow up visits, this note will serve as a discharge from care along with most recent update on progress.

## 2022-12-01 ENCOUNTER — HOSPITAL ENCOUNTER (OUTPATIENT)
Dept: PHYSICAL THERAPY | Age: 37
Setting detail: THERAPIES SERIES
Discharge: HOME | End: 2022-12-01

## 2022-12-01 ENCOUNTER — TELEPHONE (OUTPATIENT)
Dept: ORTHOPEDIC SURGERY | Age: 37
End: 2022-12-01

## 2022-12-01 NOTE — TELEPHONE ENCOUNTER
Patient called and cxl appt. She is sick. I did let her know we would like to see her, but the timing can be adjusted. She will call back to padmini her 2nd PO appt when she is feeling better. All questions answered. Patient expressed understanding.

## 2022-12-01 NOTE — TELEPHONE ENCOUNTER
General Question     Subject: Patient is wondering if the 2nd post-op is necessary.    Patient: Richie Mercedes  Contact Number: [phone]

## 2022-12-01 NOTE — FLOWSHEET NOTE
[address] [id number]     Physical Therapy  Cancellation/No-show Note  Patient Name:  Malathi Casanova  :  1985   Date:  2022  Cancelled visits to date: 1  No-shows to date: 0    Patient status for today's appointment patient:  [x]  Cancelled  []  Rescheduled appointment  []  No-show     Reason given by patient:  [x]  Patient ill  []  Conflicting appointment  []  No transportation    []  Conflict with work  []  No reason given  []  Other:     Comments:      Phone call information:   []  Phone call made today to patient at _ time at number provided:      []  Patient answered, conversation as follows:    []  Patient did not answer, message left as follows:  []  Phone call not made today  [x]  Phone call not needed - pt contacted us to cancel and provided reason for cancellation.      Electronically signed by:  Yolanda Carney, PT DPT, MS

## 2022-12-02 ENCOUNTER — TELEMEDICINE (OUTPATIENT)
Dept: FAMILY MEDICINE CLINIC | Age: 37
End: 2022-12-02
Payer: COMMERCIAL

## 2022-12-02 DIAGNOSIS — J02.0 ACUTE STREPTOCOCCAL PHARYNGITIS: ICD-10-CM

## 2022-12-02 DIAGNOSIS — R50.9 FEVER, UNSPECIFIED FEVER CAUSE: Primary | ICD-10-CM

## 2022-12-02 LAB
INFLUENZA A ANTIGEN, POC: NEGATIVE
INFLUENZA B ANTIGEN, POC: NEGATIVE
S PYO AG THROAT QL: POSITIVE

## 2022-12-02 PROCEDURE — G8417 CALC BMI ABV UP PARAM F/U: HCPCS | Performed by: NURSE PRACTITIONER

## 2022-12-02 PROCEDURE — 1036F TOBACCO NON-USER: CPT | Performed by: NURSE PRACTITIONER

## 2022-12-02 PROCEDURE — 87880 STREP A ASSAY W/OPTIC: CPT | Performed by: NURSE PRACTITIONER

## 2022-12-02 PROCEDURE — 87804 INFLUENZA ASSAY W/OPTIC: CPT | Performed by: NURSE PRACTITIONER

## 2022-12-02 PROCEDURE — 99214 OFFICE O/P EST MOD 30 MIN: CPT | Performed by: NURSE PRACTITIONER

## 2022-12-02 PROCEDURE — G8484 FLU IMMUNIZE NO ADMIN: HCPCS | Performed by: NURSE PRACTITIONER

## 2022-12-02 PROCEDURE — G8427 DOCREV CUR MEDS BY ELIG CLIN: HCPCS | Performed by: NURSE PRACTITIONER

## 2022-12-02 RX ORDER — VILAZODONE HYDROCHLORIDE 10 MG/1
TABLET ORAL
COMMUNITY
Start: 2022-11-09

## 2022-12-02 RX ORDER — PENICILLIN V POTASSIUM 500 MG/1
500 TABLET ORAL 3 TIMES DAILY
Qty: 30 TABLET | Refills: 0 | Status: SHIPPED | OUTPATIENT
Start: 2022-12-02 | End: 2022-12-12

## 2022-12-02 ASSESSMENT — ENCOUNTER SYMPTOMS
COUGH: 0
SINUS PRESSURE: 1
VOMITING: 0
CHEST TIGHTNESS: 0
SHORTNESS OF BREATH: 0
DIARRHEA: 0
WHEEZING: 0
SORE THROAT: 1
ABDOMINAL PAIN: 0
NAUSEA: 0
SINUS PAIN: 1

## 2022-12-02 ASSESSMENT — PATIENT HEALTH QUESTIONNAIRE - PHQ9
9. THOUGHTS THAT YOU WOULD BE BETTER OFF DEAD, OR OF HURTING YOURSELF: 0
2. FEELING DOWN, DEPRESSED OR HOPELESS: 0
5. POOR APPETITE OR OVEREATING: 1
7. TROUBLE CONCENTRATING ON THINGS, SUCH AS READING THE NEWSPAPER OR WATCHING TELEVISION: 1
SUM OF ALL RESPONSES TO PHQ QUESTIONS 1-9: 4
SUM OF ALL RESPONSES TO PHQ QUESTIONS 1-9: 4
4. FEELING TIRED OR HAVING LITTLE ENERGY: 1
SUM OF ALL RESPONSES TO PHQ QUESTIONS 1-9: 4
10. IF YOU CHECKED OFF ANY PROBLEMS, HOW DIFFICULT HAVE THESE PROBLEMS MADE IT FOR YOU TO DO YOUR WORK, TAKE CARE OF THINGS AT HOME, OR GET ALONG WITH OTHER PEOPLE: 0
3. TROUBLE FALLING OR STAYING ASLEEP: 1
SUM OF ALL RESPONSES TO PHQ QUESTIONS 1-9: 4
SUM OF ALL RESPONSES TO PHQ9 QUESTIONS 1 & 2: 0
8. MOVING OR SPEAKING SO SLOWLY THAT OTHER PEOPLE COULD HAVE NOTICED. OR THE OPPOSITE, BEING SO FIGETY OR RESTLESS THAT YOU HAVE BEEN MOVING AROUND A LOT MORE THAN USUAL: 0
6. FEELING BAD ABOUT YOURSELF - OR THAT YOU ARE A FAILURE OR HAVE LET YOURSELF OR YOUR FAMILY DOWN: 0
1. LITTLE INTEREST OR PLEASURE IN DOING THINGS: 0

## 2022-12-02 NOTE — PROGRESS NOTES
2022    TELEHEALTH EVALUATION -- Audio/Visual (During BPVGI-45 public health emergency)    Nicolas Maradiaga (:  1985) has requested an audio/video evaluation for the following concern(s):    Complains of bilateral swollen lymph nodes, sore throat, fever, weakness, fatigue. Fever up to 101.9.  she is using dayquil and nyquil. Denies cough. Son has had strep a few times in the past two months. Review of Systems   Constitutional:  Positive for chills, diaphoresis, fatigue and fever. HENT:  Positive for congestion, ear pain, postnasal drip, sinus pressure, sinus pain and sore throat. Respiratory:  Negative for cough, chest tightness, shortness of breath and wheezing. Cardiovascular:  Negative for chest pain, palpitations and leg swelling. Gastrointestinal:  Negative for abdominal pain, diarrhea, nausea and vomiting. Genitourinary: Negative. Neurological:  Positive for weakness and headaches. Negative for dizziness. Prior to Visit Medications    Medication Sig Taking?  Authorizing Provider   vilazodone HCl (VIIBRYD) 10 MG TABS TAKE 1 TABLET BY MOUTH EVERY DAY Yes Historical Provider, MD   levonorgestrel (MIRENA, 52 MG,) IUD 52 mg 1 each by IntraUTERine route Yes RUPERTO Monteiro CNP   omeprazole (PRILOSEC) 40 MG delayed release capsule Take 1 capsule by mouth Daily TAKE 1 CAPSULE DAILY IN THE MORNING Yes RUPERTO Monteiro CNP   levothyroxine (SYNTHROID) 25 MCG tablet Take 1 tablet by mouth Daily Take 1 tablet by mouth once daily Yes RUPERTO Monteiro CNP   amphetamine-dextroamphetamine (ADDERALL) 20 MG tablet TAKE 1 TABLET BY MOUTH THREE TIMES DAILY Yes Historical Provider, MD   acetaminophen (TYLENOL) 325 MG tablet Take 650 mg by mouth as needed for Pain Yes Historical Provider, MD   aspirin 325 MG EC tablet Take 1 tablet by mouth daily for 14 days  Kat Terrazas MD   busPIRone (BUSPAR) 30 MG tablet   Historical Provider, MD     Past Medical History:   Diagnosis Date    Abnormal Pap smear of cervix     prior LEEP. Arthritis     upper cervical area pain    Carpal tunnel syndrome of right wrist 5/10/2017    Gestational diabetes     pre-diabetic--not medically treated    Hypothyroidism     Iron deficiency anemia     Obesity, unspecified     Pancreatitis     PCOS (polycystic ovarian syndrome)     Sleep apnea     requires CPAP while sleeping. Past Surgical History:   Procedure Laterality Date    CARPAL TUNNEL RELEASE Right 2017    Right carpal tunnel release       SECTION  2016    CHOLECYSTECTOMY, LAPAROSCOPIC  2010    [facility]    LEEP      [place]    SHOULDER ARTHROSCOPY Left 10/21/2022    LEFT SHOULDER ARTHROSCOPY; DISTAL CLAVICLE EXCISION-BLOCK performed by Latricia Leigh MD at [address]  2017    laparoscopic - Dr. Radha Soares Bilateral     WISDOM TOOTH EXTRACTION       Family History   Problem Relation Age of Onset    High Blood Pressure Mother     Diabetes Maternal Grandfather     High Blood Pressure Maternal Grandfather     Diabetes Maternal Grandmother     Cancer Maternal Grandmother     Alcohol Abuse Father         alcohol     Cancer Paternal Grandmother      No Known Allergies  Social History     Tobacco Use    Smoking status: Never    Smokeless tobacco: Never   Substance Use Topics    Alcohol use: Yes     Alcohol/week: 0.0 standard drinks     Comment: 1 drink every 6 months    Drug use: No        PHYSICAL EXAMINATION:  Vital Signs: (As obtained by patient/caregiver or practitioner observation)  There were no vitals taken for this visit. Respiratory rate appears normal  Constitutional: Appears well-developed and well-nourished. No apparent distress    Mental status: Alert and awake. Oriented to person/place/mindy. Able to follow commands    Eyes: EOM normal. Sclera normal. No discharge visible  HENT: Normocephalic, atraumatic.    Mouth/Throat: Mucous membranes are moist. External Ears Normal    Neck: No visualized mass   Pulmonary/Chest: Respiratory effort normal.  No visualized signs of difficulty breathing or respiratory distress        Musculoskeletal:  Normal range of motion of neck  Neurological: No Facial Asymmetry (Cranial nerve 7 motor function) (limited exam to video visit). No gaze palsy       Skin:  No significant exanthematous lesions or discoloration noted on facial skin       Psychiatric: Normal Affect. No Hallucinations          ASSESSMENT/PLAN:  1. Fever, unspecified fever cause  Unstable  Recommend drinking plenty of fluids, rest as much as possible, tylenol or motrin as needed for body aches, pain or fever. May also use other the counter cold and flu medications such as NyQuil or DayQuil as needed. If using OTC cough and cold medication avoid tylenol. Throat lozenges may help with symptoms also. - POCT Influenza A/B Antigen (BD Veritor)  - COVID-19; Future  - Culture, Throat  - POCT rapid strep A    2. Acute streptococcal pharyngitis  Unstable  Positive strep screen today  Finish all antibiotics  - penicillin v potassium (VEETID) 500 MG tablet; Take 1 tablet by mouth 3 times daily for 10 days  Dispense: 30 tablet; Refill: 0    Return if symptoms worsen or fail to improve. Abdulaziz Smith is a 30 y.o. female being evaluated by a Virtual Visit (video visit) encounter to address concerns as mentioned above. A caregiver was present when appropriate. Due to this being a TeleHealth encounter (During \[facility] [place] [facility]\""I-38 public health emergency), evaluation of the following organ systems was limited: Vitals/Constitutional/EENT/Resp/CV/GI//MS/Neuro/Skin/Heme-Lymph-Imm.   Pursuant to the emergency declaration under the [facility], [address] [place] [address] authority and the [employer] Act, this Virtual Visit was conducted with patient's (and/or legal guardian's) consent, to reduce the patient's risk of exposure to COVID-19 and provide necessary medical care. The patient (and/or legal guardian) has also been advised to contact this office for worsening conditions or problems, and seek emergency medical treatment and/or call 911 if deemed necessary. Patient identification was verified at the start of the visit: Yes    Total time spent on this encounter:  20  minutes    Services were provided through a video synchronous discussion virtually to substitute for in-person clinic visit. Patient and provider were located at their individual homes. --Davia Libman, APRN - CNP on 12/2/2022 at 1:14 PM    An electronic signature was used to authenticate this note. Payal Lopez

## 2022-12-03 LAB — THROAT CULTURE: NORMAL

## 2022-12-04 LAB — SARS-COV-2: NOT DETECTED

## 2022-12-06 ENCOUNTER — HOSPITAL ENCOUNTER (OUTPATIENT)
Dept: PHYSICAL THERAPY | Age: 37
Setting detail: THERAPIES SERIES
Discharge: HOME | End: 2022-12-06
Payer: COMMERCIAL

## 2022-12-06 PROCEDURE — 97530 THERAPEUTIC ACTIVITIES: CPT

## 2022-12-06 PROCEDURE — 97110 THERAPEUTIC EXERCISES: CPT

## 2022-12-06 PROCEDURE — 97140 MANUAL THERAPY 1/> REGIONS: CPT

## 2022-12-06 NOTE — FLOWSHEET NOTE
[address]  Phone: [phone]   Fax: [phone]    Physical Therapy Daily Treatment Note    Date:  2022     Patient Name:  Ole Casarez    :  1985  MRN: [id number]  Medical Diagnosis:  Left shoulder pain, unspecified chronicity [M25.512]  Treatment Diagnosis:     ICD-10-CM    1. Decreased range of motion of left shoulder  M25.612       2. Decreased strength of upper extremity  R29.898          Insurance/Certification information:  PT Insurance Information: [facility]  Physician Information:  Sheila Rehamn MD    Plan of care signed (Y/N): []  Yes [x]  No     Date of Patient follow up with Physician:      Progress Report: []  Yes  [x]  No     Date Range for reporting period:  Beginnin2022  Ending:     Progress report due (10 Rx/or 30 days whichever is less): visit #10 or  (date)     Recertification due (POC duration/ or 90 days whichever is less): visit #10 or 1/15/2023 (date)     Visit # Insurance Allowable Auth required? Date Range   4 [place] []  Yes  [x]  No Calendar Year       Latex Allergy:  [x]NO      []YES  Preferred Language for Healthcare:   [x]English       []other:    Functional Scale:           Date assessed:  [facility] physical FS primary measure score = 45; risk adjusted = 47  11/15/2022     Pain level:  2/10     SUBJECTIVE: Reports having pain when putting pressure on arm/laying on arm. Moved a bag in scaption yesterday with exacerbation in pain which has resolved this date. OBJECTIVE:     : Catching/pain cont in scaption. Manual STM to Supraspinatus with decreased catching. Decreased catching with scap retraction. : Catching/pain in scaption at 90 degrees, TTP distal  supraspinatus muscle belly.  Pt fatigued quickly. Has good ROM in all planes but has pain at 100 degrees of flexion but pain ends once past 100 degrees.  Observed portals, completely closed and healing well and started scar massage. Observation:   Test measurements:      RESTRICTIONS/PRECAUTIONS: SAD, DCE 10/17/2022    Exercises/Interventions: 48'  Therapeutic Exercise (85067)  30' Resistance / level Sets / Seconds  Reps Notes/Cues          AAROM Flexion  AAROM Scaption  2/5\"  2 15  15 11/17 11/17   Shoulder Extension 4 Plates 3 15 60/80   Scapular Row 4 plates 3 15 95/36   Lower Trap Y [place] 2 15 12/06: Pain DC   Standing ER concentric [place] 2 10x Pain/ DC   Standing ER w/ band walk out [place] 2 15x 11/29   B UT stretch  30\" 2 11/17   Thoracic Rotations against wall FR 3\" 20 11/29   Thoracic extensions at chair 1/2 FR 3\" 20 11/29   No Money Light tan 2 10 11/29   Standing shoulder internal rotation Green 3 15 11/29   Scaption iso into wall 50% 30\" 4 12/06   Therapeutic Activities (56372)       Edu: Surgical procedures, impingement syndrome. Supraspinatus referral pattern and anatomy. 8'  12/06                               Neuromuscular Re-ed (74531)                                          Manual Intervention (62202)  15'       STM UT  7'  11/29 bilateral, Left supraspinatus   Shld /GH Mobs  8''  11/29   Post Cap mobs  5'     Thoracic mobilization/manipu  5'  Mid thoracic   CT MT/Mobs       PROM MT  3'  11/17   Scar massage portals  2'  11/17       Modalities:     Pt. Education:  -12/6/2022 pt educated on diagnosis, prognosis and expectations for rehab. Also educated on proper posture. The effects of Scar massage with vitamin E oil.   -all pt questions were answered    Home Exercise Program:  HEP instruction: Written HEP instructions provided and reviewed   Access Code: [email or web address]  URL: [email or web address]  Date: 11/15/2022  Prepared by: Oli Dias    Program Notes  [email or web address]    Lay on stomach. Towel under shoulder. Place back of hand and the small of your back and let your elbow hang down. 3 sets of 1 min.       Exercises  Standing Shoulder Abduction AAROM with Dowel - 1 x daily - 7 x weekly - 3 sets - 10 reps  Shoulder Scaption AAROM with Dowel - 1 x daily - 7 x weekly - 3 sets - 10 reps  Scapular Retraction with Resistance - 1 x daily - 5-7 x weekly - 3 sets - 10 reps - 5-10 seconds hold  Standing Shoulder Horizontal Abduction with Anchored Resistance - 1 x daily - 5-7 x weekly - 3 sets - 10 reps - 5-10 seconds hold  Shoulder Flexion with Anterior Anchored Resistance - 1 x daily - 5-7 x weekly - 3 sets - 10 reps - 5-10 seconds hold         Therapeutic Exercise and NMR EXR  [x] (19026) Provided verbal/tactile cueing for activities related to strengthening, flexibility, endurance, ROM  for improvements in scapular, scapulothoracic and UE control with self care, reaching, carrying, lifting, house/yardwork, driving/computer work.    [] (71675) Provided verbal/tactile cueing for activities related to improving balance, coordination, kinesthetic sense, posture, motor skill, proprioception  to assist with  scapular, scapulothoracic and UE control with self care, reaching, carrying, lifting, house/yardwork, driving/computer work.  [] (60564) Therapist is in constant attendance of 2 or more patients providing skilled therapy interventions, but not providing any significant amount of measurable one-on-one time to either patient, for improvements in cervical, scapular, scapulothoracic and UE control with self care, reaching, carrying, lifting, house/yardwork, driving, computer work. Therapeutic Activities:    [x] (07177 or 39945) Provided verbal/tactile cueing for activities related to improving balance, coordination, kinesthetic sense, posture, motor skill, proprioception and motor activation to allow for proper function of scapular, scapulothoracic and UE control with self care, carrying, lifting, driving/computer work.      Home Exercise Program:    [] (48066) Reviewed/Progressed HEP activities related to strengthening, flexibility, endurance, ROM of scapular, scapulothoracic and UE control with self care, reaching, carrying, lifting, house/yardwork, driving/computer work  [] (37595) Reviewed/Progressed HEP activities related to improving balance, coordination, kinesthetic sense, posture, motor skill, proprioception of scapular, scapulothoracic and UE control with self care, reaching, carrying, lifting, house/yardwork, driving/computer work      Manual Treatments:  PROM / STM / Oscillations-Mobs:  G-I, II, III, IV (PA's, Inf., Post.)  [x] (34154) Provided manual therapy to mobilize soft tissue/joints of cervical/CT, scapular GHJ and UE for the purpose of modulating pain, promoting relaxation,  increasing ROM, reducing/eliminating soft tissue swelling/inflammation/restriction, improving soft tissue extensibility and allowing for proper ROM for normal function with self care, reaching, carrying, lifting, house/yardwork, driving/computer work      Charges:  Timed Code Treatment Minutes: 53   Total Treatment Minutes: 53       [] EVAL - LOW (43390)   [] EVAL - MOD (27591)  [] EVAL - HIGH (92388)  [] RE-EVAL (90023)  [x] YM(05712) x   2    [] Ionto  [] NMR (77174) x       [] Vaso  [x] Manual (06686) x   1    [] Ultrasound  [x] TA x   1     [] Mech Traction (84723)  [] Aquatic Therapy x     [] ES (un) (22009):   [] Home Management Training x  [] ES(attended) (03703)   [] Dry Needling 1-2 muscles (63984):  [] Dry Needling 3+ muscles (797238  [] Group:      [] Other:                    GOALS:  Patient stated goal: Reduce pain with self care and reaching  [x] Progressing: [] Met: [] Not Met: [] Adjusted    Therapist goals for Patient:   Short Term Goals: To be achieved in: 2 weeks  1. Independent in HEP and progression per patient tolerance, in order to prevent re-injury. [x] Progressing: [] Met: [] Not Met: [] Adjusted  2. Patient will have a decrease in pain to facilitate improvement in movement, function, and ADLs as indicated by Functional Deficits. [x] Progressing: [] Met: [] Not Met: [] Adjusted    Long Term Goals:  To be achieved in: 8 weeks  1. FOTO score of at least 64 to assist with reaching prior level of function. [] Progressing: [] Met: [] Not Met: [] Adjusted  2. Patient will demonstrate increased AROM to 160 shoulder flexion to allow for proper joint functioning as indicated by patients Functional Deficits. [] Progressing: [] Met: [] Not Met: [] Adjusted  3. Patient will demonstrate an increase in left shoulder flexion and abduction Strength to 5/5 to allow for proper functional mobility as indicated by patients Functional Deficits. [] Progressing: [] Met: [] Not Met: [] Adjusted  4. Patient will return to functional activities including placing 3# object onto raised shelf 10 times without increased symptoms or restriction. [] Progressing: [] Met: [] Not Met: [] Adjusted  5. Patient will return to functional activities including donning/doffing bra without increased symptoms or restrcition.(patient specific functional goal)    [] Progressing: [] Met: [] Not Met: [] Adjusted    Overall Progression Towards Functional goals/ Treatment Progress Update:  [] Patient is progressing as expected towards functional goals listed. [] Progression is slowed due to complexities/Impairments listed. [] Progression has been slowed due to co-morbidities. [x] Plan just implemented, too soon to assess goals progression <30days   [] Goals require adjustment due to lack of progress  [] Patient is not progressing as expected and requires additional follow up with physician  [] Other    Persisting Functional Limitations/Impairments:  []Sitting []Standing   []Transfers  []Sleeping   []Reaching []Lifting   []ADLs []Housework  []Driving []Job related tasks  []Sports/Recreation []Other:    ASSESSMENT:  Pt tolerated session well. Introduced STM directly to supraspinatus this date with relief. Remaining session focused on implementing direct isometric loading to supraspinatus as well as postural correction to reduce impingement.  Thoracic extensions continued with improved thoracic mobility, cable rows and shoulder extension increased in reps this date. Attempted to progress shoulder ER to concentric but Mallika experienced pain, regressed to isometric walk out. Additionally resisted Y caused pain in anterior shoulder, regressed to close stance and minimal ROM with cont exacerbations. Hold NV. Will continue to benefit from skilled PT services to address impingement and supraspinatus pain. Treatment/Activity Tolerance:  [x] Pt able to complete treatment [x] Patient limited by fatique  [] Patient limited by pain  [] Patient limited by other medical complications  [] Other:     Prognosis:  [x] Good [] Fair  [] Poor    Patient Requires Follow-up: [x] Yes  [] No    Return to Play:    [x]  N/A     []  Stage 1: Intro to Strength   []  Stage 2: Dynamic Strength and Intro to Plyometrics   []  Stage 3: Advanced Plyometrics and Intro to Throwing   []  Stage 4: Sport specific Training/Return to Sport     []  Ready to Return to Play, [employer]   []  Not Ready for Return to Sports   Comments:      PLAN: PT 1-2x / week for 8 weeks. [x] Continue per plan of care [] Alter current plan (see comments)  [] Plan of care initiated [] Hold pending MD visit [] Discharge    Electronically signed by: Viviana Kwong, PT, DPT, MS        Note: If patient does not return for scheduled/ recommended follow up visits, this note will serve as a discharge from care along with most recent update on progress.

## 2022-12-08 ENCOUNTER — HOSPITAL ENCOUNTER (OUTPATIENT)
Dept: PHYSICAL THERAPY | Age: 37
Setting detail: THERAPIES SERIES
Discharge: HOME | End: 2022-12-08
Payer: COMMERCIAL

## 2022-12-08 PROCEDURE — 97110 THERAPEUTIC EXERCISES: CPT | Performed by: SPECIALIST/TECHNOLOGIST

## 2022-12-08 PROCEDURE — 97140 MANUAL THERAPY 1/> REGIONS: CPT | Performed by: SPECIALIST/TECHNOLOGIST

## 2022-12-08 PROCEDURE — 97016 VASOPNEUMATIC DEVICE THERAPY: CPT | Performed by: SPECIALIST/TECHNOLOGIST

## 2022-12-08 NOTE — FLOWSHEET NOTE
[address]  Phone: [phone]   Fax: [phone]    Physical Therapy Daily Treatment Note    Date:  2022     Patient Name:  Ariel Wall    :  1985  MRN: [id number]  Medical Diagnosis:  Left shoulder pain, unspecified chronicity [M25.512]  Treatment Diagnosis:     ICD-10-CM    1. Decreased range of motion of left shoulder  M25.612       2. Decreased strength of upper extremity  R29.898          Insurance/Certification information:  PT Insurance Information: [facility]  Physician Information:  Sacha Nevarez MD    Plan of care signed (Y/N): []  Yes [x]  No     Date of Patient follow up with Physician:      Progress Report: []  Yes  [x]  No     Date Range for reporting period:  Beginnin2022  Ending:     Progress report due (10 Rx/or 30 days whichever is less): visit #10 or  (date)     Recertification due (POC duration/ or 90 days whichever is less): visit #10 or 1/15/2023 (date)     Visit # Insurance Allowable Auth required? Date Range   5 [place] []  Yes  [x]  No Calendar Year       Latex Allergy:  [x]NO      []YES  Preferred Language for Healthcare:   [x]English       []other:    Functional Scale:           Date assessed:  [facility] physical FS primary measure score = 45; risk adjusted = 47  11/15/2022     Pain level:  0/10 Has some stiffness with arms OH or out to side but is also weak    SUBJECTIVE:  Left shoulder doing better but is weak most of the time and fatigues when in positions while overhead. OBJECTIVE:     : Catching/pain cont in scaption. Manual STM to Supraspinatus with decreased catching. Decreased catching with scap retraction. : Catching/pain in scaption at 90 degrees, TTP distal  supraspinatus muscle belly.  Pt fatigued quickly. Has good ROM in all planes but has pain at 100 degrees of flexion but pain ends once past 100 degrees.  Observed portals, completely closed and healing well and started scar massage. Observation:   Test measurements:      RESTRICTIONS/PRECAUTIONS: SAD, DCE 10/17/2022    Exercises/Interventions: 48'  Therapeutic Exercise (86831)  30' Resistance / level Sets / Seconds  Reps Notes/Cues   AD / UE  3'    12/8   Shoulder Extension Left only 2 Plates 2 90V 46/1   Scapular Row 4 plates 3 15    Lower Trap Y [employer] 2 15 12/06: Pain DC   Standing ER concentric [place] 2 10x Pain/ DC     HEP   Thoracic Rotations against wall FR 3\" 20 11/29   No Money  Teal 2 10 12/8   Standing shoulder internal rotation Green   HOLD 3 15 12/8   Scaption iso into wall 50% 30\" 4 12/06   SL IR sleeper stretch  15\" 3x 12/8   SL  ER 3# 3 10x 12/8   Supine cane press   Supine press  5#  3# 2 10xea 12/8   Therapeutic Activities (33249)       E PROGRESS/ Re Eval          NPV                             Neuromuscular Re-ed (77460)                                          Manual Intervention (25940)  15'       Shld /GH Mobs  8''  11/29   Post Cap mobs  5'     Thoracic mobilization/manipu  5'  Mid thoracic   CT MT/Mobs       PROM MT  3'         Modalities:     Pt. Education:  -12/8/2022 pt educated on diagnosis, prognosis and expectations for rehab. Also educated on proper posture. The effects of Scar massage with vitamin E oil.   -all pt questions were answered    Home Exercise Program:  HEP instruction: Written HEP instructions provided and reviewed   Access Code: [email or web address]  URL: [email or web address]  Date: 11/15/2022  Prepared by: Corey Alfred    Program Notes  [email or web address]    Lay on stomach. Towel under shoulder. Place back of hand and the small of your back and let your elbow hang down. 3 sets of 1 min.       Exercises  Standing Shoulder Abduction AAROM with Dowel - 1 x daily - 7 x weekly - 3 sets - 10 reps  Shoulder Scaption AAROM with Dowel - 1 x daily - 7 x weekly - 3 sets - 10 reps  Scapular Retraction with Resistance - 1 x daily - 5-7 x weekly - 3 sets - 10 reps - 5-10 seconds hold  Standing Shoulder Horizontal Abduction with Anchored Resistance - 1 x daily - 5-7 x weekly - 3 sets - 10 reps - 5-10 seconds hold  Shoulder Flexion with Anterior Anchored Resistance - 1 x daily - 5-7 x weekly - 3 sets - 10 reps - 5-10 seconds hold         Therapeutic Exercise and NMR EXR  [x] (32428) Provided verbal/tactile cueing for activities related to strengthening, flexibility, endurance, ROM  for improvements in scapular, scapulothoracic and UE control with self care, reaching, carrying, lifting, house/yardwork, driving/computer work.    [] (72162) Provided verbal/tactile cueing for activities related to improving balance, coordination, kinesthetic sense, posture, motor skill, proprioception  to assist with  scapular, scapulothoracic and UE control with self care, reaching, carrying, lifting, house/yardwork, driving/computer work.  [] (43410) Therapist is in constant attendance of 2 or more patients providing skilled therapy interventions, but not providing any significant amount of measurable one-on-one time to either patient, for improvements in cervical, scapular, scapulothoracic and UE control with self care, reaching, carrying, lifting, house/yardwork, driving, computer work. Therapeutic Activities:    [x] (38476 or 60137) Provided verbal/tactile cueing for activities related to improving balance, coordination, kinesthetic sense, posture, motor skill, proprioception and motor activation to allow for proper function of scapular, scapulothoracic and UE control with self care, carrying, lifting, driving/computer work.      Home Exercise Program:    [] (35399) Reviewed/Progressed HEP activities related to strengthening, flexibility, endurance, ROM of scapular, scapulothoracic and UE control with self care, reaching, carrying, lifting, house/yardwork, driving/computer work  [] (23062) Reviewed/Progressed HEP activities related to improving balance, coordination, kinesthetic sense, posture, motor skill, proprioception of scapular, scapulothoracic and UE control with self care, reaching, carrying, lifting, house/yardwork, driving/computer work      Manual Treatments:  PROM / STM / Oscillations-Mobs:  G-I, II, III, IV (PA's, Inf., Post.)  [x] (46629) Provided manual therapy to mobilize soft tissue/joints of cervical/CT, scapular GHJ and UE for the purpose of modulating pain, promoting relaxation,  increasing ROM, reducing/eliminating soft tissue swelling/inflammation/restriction, improving soft tissue extensibility and allowing for proper ROM for normal function with self care, reaching, carrying, lifting, house/yardwork, driving/computer work  10' vasopneumatic to left shoulder    Charges:  Timed Code Treatment Minutes: 45   Total Treatment Minutes: 55       [] EVAL - LOW (54116)   [] EVAL - MOD (90145)  [] EVAL - HIGH (97405)  [] RE-EVAL (24115)  [x] TQ(63817) x   2    [] Ionto  [] NMR (76211) x       [x] Vaso  [x] Manual (12895) x   1    [] Ultrasound  [] TA x        [] Mech Traction (01474)  [] Aquatic Therapy x     [] ES (un) (33855):   [] Home Management Training x  [] ES(attended) (61591)   [] Dry Needling 1-2 muscles (53646):  [] Dry Needling 3+ muscles (830502  [] Group:      [] Other:                    GOALS:  Patient stated goal: Reduce pain with self care and reaching  [x] Progressing: [] Met: [] Not Met: [] Adjusted    Therapist goals for Patient:   Short Term Goals: To be achieved in: 2 weeks  1. Independent in HEP and progression per patient tolerance, in order to prevent re-injury. [x] Progressing: [] Met: [] Not Met: [] Adjusted  2. Patient will have a decrease in pain to facilitate improvement in movement, function, and ADLs as indicated by Functional Deficits. [x] Progressing: [] Met: [] Not Met: [] Adjusted    Long Term Goals: To be achieved in: 8 weeks  1. FOTO score of at least 64 to assist with reaching prior level of function. [] Progressing: [] Met: [] Not Met: [] Adjusted  2.  Patient will demonstrate increased AROM to 160 shoulder flexion to allow for proper joint functioning as indicated by patients Functional Deficits. [] Progressing: [] Met: [] Not Met: [] Adjusted  3. Patient will demonstrate an increase in left shoulder flexion and abduction Strength to 5/5 to allow for proper functional mobility as indicated by patients Functional Deficits. [] Progressing: [] Met: [] Not Met: [] Adjusted  4. Patient will return to functional activities including placing 3# object onto raised shelf 10 times without increased symptoms or restriction. [] Progressing: [] Met: [] Not Met: [] Adjusted  5. Patient will return to functional activities including donning/doffing bra without increased symptoms or restrcition.(patient specific functional goal)    [] Progressing: [] Met: [] Not Met: [] Adjusted    Overall Progression Towards Functional goals/ Treatment Progress Update:  [] Patient is progressing as expected towards functional goals listed. [] Progression is slowed due to complexities/Impairments listed. [] Progression has been slowed due to co-morbidities. [x] Plan just implemented, too soon to assess goals progression <30days   [] Goals require adjustment due to lack of progress  [] Patient is not progressing as expected and requires additional follow up with physician  [] Other    Persisting Functional Limitations/Impairments:  []Sitting []Standing   []Transfers  []Sleeping   []Reaching []Lifting   []ADLs []Housework  []Driving []Job related tasks  []Sports/Recreation []Other:    ASSESSMENT:  Pt tolerated session well and challenged with resuming ER and focus on scapular stability. Remaining session focused on implementing direct isometric loading to supraspinatus as well as postural correction to reduce impingement. Pt had some increased weakness reported w/ pain performing IR on cable column so this was finished with TB.   Will continue to benefit from skilled PT services to address impingement and supraspinatus pain. Treatment/Activity Tolerance:  [x] Pt able to complete treatment [x] Patient limited by fatique  [] Patient limited by pain  [] Patient limited by other medical complications  [] Other:     Prognosis:  [x] Good [] Fair  [] Poor    Patient Requires Follow-up: [x] Yes  [] No    Return to Play:    [x]  N/A     []  Stage 1: Intro to Strength   []  Stage 2: Dynamic Strength and Intro to Plyometrics   []  Stage 3: Advanced Plyometrics and Intro to Throwing   []  Stage 4: Sport specific Training/Return to Sport     []  Ready to Return to Play, [employer]   []  Not Ready for Return to Sports   Comments:      PLAN: PT 1-2x / week for 8 weeks. [x] Continue per plan of care [] Alter current plan (see comments)  [] Plan of care initiated [] Hold pending MD visit [] Discharge    Electronically signed by: Juan Antonio Guillaume PTA, [id number]        Note: If patient does not return for scheduled/ recommended follow up visits, this note will serve as a discharge from care along with most recent update on progress.

## 2022-12-15 ENCOUNTER — HOSPITAL ENCOUNTER (OUTPATIENT)
Dept: PHYSICAL THERAPY | Age: 37
Setting detail: THERAPIES SERIES
Discharge: HOME | End: 2022-12-15
Payer: COMMERCIAL

## 2022-12-15 PROCEDURE — 97140 MANUAL THERAPY 1/> REGIONS: CPT | Performed by: SPECIALIST/TECHNOLOGIST

## 2022-12-15 PROCEDURE — 97530 THERAPEUTIC ACTIVITIES: CPT | Performed by: SPECIALIST/TECHNOLOGIST

## 2022-12-15 PROCEDURE — 97110 THERAPEUTIC EXERCISES: CPT | Performed by: SPECIALIST/TECHNOLOGIST

## 2022-12-20 ENCOUNTER — HOSPITAL ENCOUNTER (OUTPATIENT)
Dept: PHYSICAL THERAPY | Age: 37
Setting detail: THERAPIES SERIES
Discharge: HOME | End: 2022-12-20
Payer: COMMERCIAL

## 2022-12-20 PROCEDURE — 97110 THERAPEUTIC EXERCISES: CPT | Performed by: SPECIALIST/TECHNOLOGIST

## 2022-12-20 PROCEDURE — 97016 VASOPNEUMATIC DEVICE THERAPY: CPT | Performed by: SPECIALIST/TECHNOLOGIST

## 2022-12-20 PROCEDURE — 97140 MANUAL THERAPY 1/> REGIONS: CPT | Performed by: SPECIALIST/TECHNOLOGIST

## 2022-12-20 NOTE — FLOWSHEET NOTE
175 w/ pain 180   Shoulder External Rotation       WNL WNL    Shoulder Internal Rotation        WNL  WNL     FLEXION                                                              180 w/ pain             180  Strength (0-5) Left Right    Shoulder Shrug (C4) 5 5   Shoulder Flex 14.9 21.2   Shoulder Abd (C5) 15.5 27.6   Shoulder ER 16.5 21   Shoulder IR 15.7 19.2   Biceps (C6) 5 5   Triceps (C7) 5 5    Left anterior shoulder Ac tenderness with Ac soreness present and \"painful midarc \" around 90 w/ manual stretching    12/06: Catching/pain cont in scaption. Manual STM to Supraspinatus with decreased catching. Decreased catching with scap retraction. 11/29: Catching/pain in scaption at 90 degrees, TTP distal  supraspinatus muscle belly. 11/17 Pt fatigued quickly. Has good ROM in all planes but has pain at 100 degrees of flexion but pain ends once past 100 degrees. Observed portals, completely closed and healing well and started scar massage. Observation:   Test measurements:      RESTRICTIONS/PRECAUTIONS: SAD, DCE 10/17/2022    Exercises/Interventions: 45'  Therapeutic Exercise (97022)  30' Resistance / level Sets / Seconds  Reps Notes/Cues   AD / UE  4'    12/20   Standing ROWs 8# 2 10# 12/15   Shoulder Extension Left only 2 Plates 2 91P 95/53   Scapular Row 4 plates 3 15 53/35   Lower Trap Y [place] 2 15 12/06: Pain DC                 No Money  Teal 2 10 12/20   Standing shoulder internal rotation  LPD  Green    CC 4plates 2 15 28/62 attempted IR w/    SL IR sleeper stretch/ standing with wall   15\" 3x 12/15   SL  ER 3# 3 10x 12/20   Supine cane press   Supine press  5#  3# 3 10xea 12/15   Therapeutic Activities (09392) 15'       Eurgical procedures, impingement syndrome.   PROGRESS assessment/ foto  Reviewed HEP and progression of strength etc today/ education for RICE for decreased tissue irritation 5'      10'         12/15  12/15                              Neuromuscular Re-ed (67117) 10'       Scaption  0# 2 10 12/20    BB ER/IR    Flexion  yellow 15\" 5x 12/20                        Manual Intervention (86159)  10'       Shld /GH Mobs       Post Cap mobs  5'     CT MT/Mobs       PROM MT  5'     Rythmic stabilizations   10\" 5x 12/20       Modalities:     Pt. Education:  -12/20/2022 pt educated on diagnosis, prognosis and expectations for rehab. Also educated on proper posture. The effects of Scar massage with vitamin E oil.   -all pt questions were answered    Home Exercise Program:  HEP instruction: Written HEP instructions provided and reviewed   Access Code: [email or web address]  URL: [email or web address]  Date: 11/15/2022  Prepared by: Ryan Alexander    Program Notes  [email or web address]    Lay on stomach. Towel under shoulder. Place back of hand and the small of your back and let your elbow hang down. 3 sets of 1 min.       Exercises  Standing Shoulder Abduction AAROM with Dowel - 1 x daily - 7 x weekly - 3 sets - 10 reps  Shoulder Scaption AAROM with Dowel - 1 x daily - 7 x weekly - 3 sets - 10 reps  Scapular Retraction with Resistance - 1 x daily - 5-7 x weekly - 3 sets - 10 reps - 5-10 seconds hold  Standing Shoulder Horizontal Abduction with Anchored Resistance - 1 x daily - 5-7 x weekly - 3 sets - 10 reps - 5-10 seconds hold  Shoulder Flexion with Anterior Anchored Resistance - 1 x daily - 5-7 x weekly - 3 sets - 10 reps - 5-10 seconds hold       Therapeutic Exercise and NMR EXR  [x] (19509) Provided verbal/tactile cueing for activities related to strengthening, flexibility, endurance, ROM  for improvements in scapular, scapulothoracic and UE control with self care, reaching, carrying, lifting, house/yardwork, driving/computer work.    [] (19995) Provided verbal/tactile cueing for activities related to improving balance, coordination, kinesthetic sense, posture, motor skill, proprioception  to assist with  scapular, scapulothoracic and UE control with self care, reaching, carrying, lifting, house/yardwork, driving/computer work.  [] (39866) Therapist is in constant attendance of 2 or more patients providing skilled therapy interventions, but not providing any significant amount of measurable one-on-one time to either patient, for improvements in cervical, scapular, scapulothoracic and UE control with self care, reaching, carrying, lifting, house/yardwork, driving, computer work. Therapeutic Activities:    [x] (63386 or 43523) Provided verbal/tactile cueing for activities related to improving balance, coordination, kinesthetic sense, posture, motor skill, proprioception and motor activation to allow for proper function of scapular, scapulothoracic and UE control with self care, carrying, lifting, driving/computer work.      Home Exercise Program:     [] (57319) Reviewed/Progressed HEP activities related to strengthening, flexibility, endurance, ROM of scapular, scapulothoracic and UE control with self care, reaching, carrying, lifting, house/yardwork, driving/computer work  [] (12437) Reviewed/Progressed HEP activities related to improving balance, coordination, kinesthetic sense, posture, motor skill, proprioception of scapular, scapulothoracic and UE control with self care, reaching, carrying, lifting, house/yardwork, driving/computer work      Manual Treatments:  PROM / STM / Oscillations-Mobs:  G-I, II, III, IV (PA's, Inf., Post.)  [x] (41279) Provided manual therapy to mobilize soft tissue/joints of cervical/CT, scapular GHJ and UE for the purpose of modulating pain, promoting relaxation,  increasing ROM, reducing/eliminating soft tissue swelling/inflammation/restriction, improving soft tissue extensibility and allowing for proper ROM for normal function with self care, reaching, carrying, lifting, house/yardwork, driving/computer work  10' Vaso to left shoulder    Charges:  Timed Code Treatment Minutes: 45   Total Treatment Minutes: 55       [] EVAL - LOW (78495)   [] EVAL - MOD (11051)  [] EVAL - HIGH (89960)  [] RE-EVAL (73080)  [x] WJ(40672) x   2    [] Ionto  [] NMR (24316) x       [x] Vaso  [x] Manual (94227) x   1    [] Ultrasound  [] TA x  1      [] Mech Traction (39042)  [] Aquatic Therapy x     [] ES (un) (63313):   [] Home Management Training x  [] ES(attended) (76194)   [] Dry Needling 1-2 muscles (37200):  [] Dry Needling 3+ muscles (727730  [] Group:      [] Other:                    GOALS:  Patient stated goal: Reduce pain with self care and reaching  [x] Progressing: [] Met: [] Not Met: [] Adjusted    Therapist goals for Patient:   Short Term Goals: To be achieved in: 2 weeks  1. Independent in HEP and progression per patient tolerance, in order to prevent re-injury. [x] Progressing: [] Met: [] Not Met: [] Adjusted  2. Patient will have a decrease in pain to facilitate improvement in movement, function, and ADLs as indicated by Functional Deficits. [x] Progressing: [] Met: [] Not Met: [] Adjusted    Long Term Goals: To be achieved in: 8 weeks  1. FOTO score of at least 64 to assist with reaching prior level of function. [x] Progressing: [] Met: [] Not Met: [] Adjusted  2. Patient will demonstrate increased AROM to 160 shoulder flexion to allow for proper joint functioning as indicated by patients Functional Deficits. [] Progressing: [x] Met: [] Not Met: [] Adjusted  3. Patient will demonstrate an increase in left shoulder flexion and abduction Strength to 5/5 to allow for proper functional mobility as indicated by patients Functional Deficits. [x] Progressing: [] Met: [] Not Met: [] Adjusted  4. Patient will return to functional activities including placing 3# object onto raised shelf 10 times without increased symptoms or restriction. [x] Progressing: [] Met: [] Not Met: [] Adjusted  5.  Patient will return to functional activities including donning/doffing bra without increased symptoms or restrcition.(patient specific functional goal)    [x] Progressing: [] Met: [] Not Met: [] Adjusted    Overall Progression Towards Functional goals/ Treatment Progress Update:  [x] Patient is progressing as expected towards functional goals listed. [] Progression is slowed due to complexities/Impairments listed. [] Progression has been slowed due to co-morbidities. [] Plan just implemented, too soon to assess goals progression <30days   [] Goals require adjustment due to lack of progress  [] Patient is not progressing as expected and requires additional follow up with physician  [] Other    Persisting Functional Limitations/Impairments:  []Sitting []Standing   []Transfers  [x]Sleeping   [x]Reaching [x]Lifting   []ADLs []Housework  []Driving []Job related tasks  []Sports/Recreation []Other:    ASSESSMENT:  Pt had increased general soreness with abducted positions today which required decreased intensity as a result. Pt has irritated the RTC muscle group with abducted postures and using her left arm more than she was used to. Pt advised to continue with icing Left shoulder and monitoring activities that increase the stress to her Left RTC. Pt had again increased weakness reported w/ pain performing IR on cable column so this was finished with TB. Will continue to benefit from skilled PT services to address impingement and supraspinatus pain. Treatment/Activity Tolerance:  [x] Pt able to complete treatment [x] Patient limited by fatique  [] Patient limited by pain  [] Patient limited by other medical complications  [] Other:     Prognosis:  [x] Good [] Fair  [] Poor    Patient Requires Follow-up: [x] Yes  [] No    Return to Play:    [x]  N/A     []  Stage 1: Intro to Strength   []  Stage 2: Dynamic Strength and Intro to Plyometrics   []  Stage 3: Advanced Plyometrics and Intro to Throwing   []  Stage 4: Sport specific Training/Return to Sport     []  Ready to Return to Play, [employer]   []  Not Ready for Return to Sports   Comments:      PLAN: PT 1-2x / week for 8 weeks.  Recommend icing and Aleve for symptom relief over anterior shoulder or RS to see Jacques Grier again for possible injection. Is using OTC meds which have helped but can also use Voltaren gel to help with inflammation  [x] Continue per plan of care [] Alter current plan (see comments)  [] Plan of care initiated [] Hold pending MD visit [] Discharge    Electronically signed by: Sherrell Jacobson, PTA, [id number]    Note: If patient does not return for scheduled/ recommended follow up visits, this note will serve as a discharge from care along with most recent update on progress.

## 2022-12-27 ENCOUNTER — HOSPITAL ENCOUNTER (OUTPATIENT)
Dept: PHYSICAL THERAPY | Age: 37
Setting detail: THERAPIES SERIES
Discharge: HOME | End: 2022-12-27
Payer: COMMERCIAL

## 2022-12-27 PROCEDURE — 97140 MANUAL THERAPY 1/> REGIONS: CPT

## 2022-12-27 PROCEDURE — 97110 THERAPEUTIC EXERCISES: CPT

## 2022-12-27 NOTE — FLOWSHEET NOTE
[address]  Phone: [phone]   Fax: [phone]    Physical Therapy Daily Treatment Note    Date:  2022     Patient Name:  Sebastian Main    :  1985  MRN: [id number]  Medical Diagnosis:  Left shoulder pain, unspecified chronicity [M25.512]  Treatment Diagnosis:     ICD-10-CM    1. Decreased range of motion of left shoulder  M25.612       2. Decreased strength of upper extremity  R29.898          Insurance/Certification information:  PT Insurance Information: [facility]  Physician Information:  Haylie Pagan MD    Plan of care signed (Y/N): []  Yes [x]  No     Date of Patient follow up with Physician: Radha Lopezr  needs to schedule     Progress Report: [x]  Yes  []  No     Date Range for reporting period:  Beginnin2022  Ending:     Progress report due (10 Rx/or 30 days whichever is less): visit #10 or  (date)     Recertification due (POC duration/ or 90 days whichever is less): visit #10 or 1/15/2023 (date)     Visit # Insurance Allowable Auth required? Date Range   7 [place] []  Yes  [x]  No Calendar Year       Latex Allergy:  [x]NO      []YES  Preferred Language for Healthcare:   [x]English       []other:    Functional Scale:           Date assessed:  TO physical FS primary measure score = 45; risk adjusted = 47  11/15/2022   FOTO                                                                                                  55                  12/15/22                          Pain level:  0/10 Has some stiffness with arms OH or out to side but is also weak    SUBJECTIVE:  Pt experiences intermittent, transient lateral L SHLD pains with reaching actions. Pt's primary c/o is L UE weakness and lack of ms endurance which is affecting sustained L UE use capabilities.        OBJECTIVE:     12/15/22                                                                          LT                  RT  Shoulder Abduction      175 w/ BB ER/IR    Flexion  yellow 15\" 5x 12/20                        Manual Intervention (90914)  10'       Shld /GH Mobs       Post Cap mobs  5'     CT MT/Mobs       PROM MT  5'     Rythmic stabilizations   10\" 5x 12/20       Modalities:     Pt. Education:  -12/27/2022 pt educated on diagnosis, prognosis and expectations for rehab. Also educated on proper posture. The effects of Scar massage with vitamin E oil.   -all pt questions were answered    Home Exercise Program:  HEP instruction: Written HEP instructions provided and reviewed   Access Code: [email or web address]  URL: [email or web address]  Date: 11/15/2022  Prepared by: Kadi Rankin    Program Notes  [email or web address]    Lay on stomach. Towel under shoulder. Place back of hand and the small of your back and let your elbow hang down. 3 sets of 1 min. Exercises  Standing Shoulder Abduction AAROM with Dowel - 1 x daily - 7 x weekly - 3 sets - 10 reps  Shoulder Scaption AAROM with Dowel - 1 x daily - 7 x weekly - 3 sets - 10 reps  Scapular Retraction with Resistance - 1 x daily - 5-7 x weekly - 3 sets - 10 reps - 5-10 seconds hold  Standing Shoulder Horizontal Abduction with Anchored Resistance - 1 x daily - 5-7 x weekly - 3 sets - 10 reps - 5-10 seconds hold  Shoulder Flexion with Anterior Anchored Resistance - 1 x daily - 5-7 x weekly - 3 sets - 10 reps - 5-10 seconds hold       Therapeutic Exercise and NMR EXR  [x] (23707) Provided verbal/tactile cueing for activities related to strengthening, flexibility, endurance, ROM  for improvements in scapular, scapulothoracic and UE control with self care, reaching, carrying, lifting, house/yardwork, driving/computer work.    [] (58037) Provided verbal/tactile cueing for activities related to improving balance, coordination, kinesthetic sense, posture, motor skill, proprioception  to assist with  scapular, scapulothoracic and UE control with self care, reaching, carrying, lifting, house/yardwork, driving/computer work.   [] DY(59644) x   2    [] Ionto  [] NMR (39389) x       [] Vaso  [x] Manual (21394) x   1    [] Ultrasound  [] TA x  1      [] Mech Traction (82372)  [] Aquatic Therapy x     [] ES (un) (92511):   [] Home Management Training x  [] ES(attended) (55154)   [] Dry Needling 1-2 muscles (37755):  [] Dry Needling 3+ muscles (317353  [] Group:      [] Other:                    GOALS:  Patient stated goal: Reduce pain with self care and reaching  [x] Progressing: [] Met: [] Not Met: [] Adjusted    Therapist goals for Patient:   Short Term Goals: To be achieved in: 2 weeks  1. Independent in HEP and progression per patient tolerance, in order to prevent re-injury. [x] Progressing: [] Met: [] Not Met: [] Adjusted  2. Patient will have a decrease in pain to facilitate improvement in movement, function, and ADLs as indicated by Functional Deficits. [x] Progressing: [] Met: [] Not Met: [] Adjusted    Long Term Goals: To be achieved in: 8 weeks  1. FOTO score of at least 64 to assist with reaching prior level of function. [x] Progressing: [] Met: [] Not Met: [] Adjusted  2. Patient will demonstrate increased AROM to 160 shoulder flexion to allow for proper joint functioning as indicated by patients Functional Deficits. [] Progressing: [x] Met: [] Not Met: [] Adjusted  3. Patient will demonstrate an increase in left shoulder flexion and abduction Strength to 5/5 to allow for proper functional mobility as indicated by patients Functional Deficits. [x] Progressing: [] Met: [] Not Met: [] Adjusted  4. Patient will return to functional activities including placing 3# object onto raised shelf 10 times without increased symptoms or restriction. [x] Progressing: [] Met: [] Not Met: [] Adjusted  5.  Patient will return to functional activities including donning/doffing bra without increased symptoms or restrcition.(patient specific functional goal)    [x] Progressing: [] Met: [] Not Met: [] Adjusted    Overall Progression Towards inflammation  [x] Continue per plan of care [] Alter current plan (see comments)  [] Plan of care initiated [] Hold pending MD visit [] Discharge    Electronically signed by: Nakul Mcfadden PTA, ATC    Note: If patient does not return for scheduled/ recommended follow up visits, this note will serve as a discharge from care along with most recent update on progress.

## 2023-01-10 ENCOUNTER — OFFICE VISIT (OUTPATIENT)
Dept: ORTHOPEDIC SURGERY | Age: 38
End: 2023-01-10
Payer: COMMERCIAL

## 2023-01-10 VITALS — BODY MASS INDEX: 40.98 KG/M2 | WEIGHT: 246 LBS | HEIGHT: 65 IN

## 2023-01-10 DIAGNOSIS — M25.512 LEFT SHOULDER PAIN, UNSPECIFIED CHRONICITY: Primary | ICD-10-CM

## 2023-01-10 PROCEDURE — 20610 DRAIN/INJ JOINT/BURSA W/O US: CPT | Performed by: ORTHOPAEDIC SURGERY

## 2023-01-10 PROCEDURE — 99024 POSTOP FOLLOW-UP VISIT: CPT | Performed by: ORTHOPAEDIC SURGERY

## 2023-01-10 RX ORDER — BUPIVACAINE HYDROCHLORIDE 5 MG/ML
4 INJECTION, SOLUTION PERINEURAL ONCE
Status: COMPLETED | OUTPATIENT
Start: 2023-01-10 | End: 2023-01-10

## 2023-01-10 RX ORDER — METHYLPREDNISOLONE ACETATE 40 MG/ML
40 INJECTION, SUSPENSION INTRA-ARTICULAR; INTRALESIONAL; INTRAMUSCULAR; SOFT TISSUE ONCE
Status: COMPLETED | OUTPATIENT
Start: 2023-01-10 | End: 2023-01-10

## 2023-01-10 RX ADMIN — BUPIVACAINE HYDROCHLORIDE 20 MG: 5 INJECTION, SOLUTION PERINEURAL at 15:24

## 2023-01-10 RX ADMIN — METHYLPREDNISOLONE ACETATE 40 MG: 40 INJECTION, SUSPENSION INTRA-ARTICULAR; INTRALESIONAL; INTRAMUSCULAR; SOFT TISSUE at 15:25

## 2023-01-19 NOTE — PROGRESS NOTES
11/3/2022      Reason for visit:  Status post left shoulder arthroscopy with distal clavicle excision and subacromial decompression on 10/21/2022     History of Present Illness:  Patient returns for postop evaluation. She does report that since last visit she has had some increasing pain within the shoulder and arm region. Especially with certain movements and activities. Objective:     Physical Exam:  The patient is well-appearing and in no apparent distress  Neg Spurling's test  Examination of the left shoulder  There is no swelling, ecchymosis, or gross deformity  There is no evidence of muscle atrophy  + Aviles test, neg Neers test  neg bicipital groove tenderness, no AC joint tenderness  Range of motion of shoulder reveals 140 degrees of forward flexion and abduction with external rotation of about 60 degrees  Intact motor and sensory function throughout the median/radial/ulnar/PIN/AIN distributions  Palpable radial pulse, brisk cap refill, 2+ symmetric reflexes      Imagin view x-rays of the left shoulder were obtained the office today on 11/3/2022 and reviewed. Postoperative changes consistent with distal clavicle excision. There is no fracture or dislocation. Assessment:  Status post left shoulder arthroscopy with distal clavicle excision and subacromial decompression on 10/21/2022     Plan:  Unfortunately patient still has pain. His difficult to discern the exact etiology at this time. It is possible she is suffering from some form of impingement at this point. Therefore we discussed cortisone injection. The patient elected proceed with cortisone injection. Therefore injection was given to the left subacromial space via sterile technique. The injection consisted of 40 mg of Depo-Medrol combined with 4 mL of 0.5% Marcaine. The patient tolerated this well. She will then return to see me in 4 weeks to assess her response.                       Fifi Hernandez MD Orthopaedic Surgery Sports Medicine and [address] and [address] [facility]            Team Physician [place] ([facility])        Disclaimer: This note was dictated with voice recognition software. Though review and correction are routine, we apologize for any errors.

## 2023-02-07 ENCOUNTER — OFFICE VISIT (OUTPATIENT)
Dept: ORTHOPEDIC SURGERY | Age: 38
End: 2023-02-07
Payer: COMMERCIAL

## 2023-02-07 VITALS — HEIGHT: 65 IN | BODY MASS INDEX: 40.98 KG/M2 | WEIGHT: 246 LBS

## 2023-02-07 DIAGNOSIS — M25.512 LEFT SHOULDER PAIN, UNSPECIFIED CHRONICITY: Primary | ICD-10-CM

## 2023-02-07 PROCEDURE — G8427 DOCREV CUR MEDS BY ELIG CLIN: HCPCS | Performed by: ORTHOPAEDIC SURGERY

## 2023-02-07 PROCEDURE — G8417 CALC BMI ABV UP PARAM F/U: HCPCS | Performed by: ORTHOPAEDIC SURGERY

## 2023-02-07 PROCEDURE — G8484 FLU IMMUNIZE NO ADMIN: HCPCS | Performed by: ORTHOPAEDIC SURGERY

## 2023-02-07 PROCEDURE — 99213 OFFICE O/P EST LOW 20 MIN: CPT | Performed by: ORTHOPAEDIC SURGERY

## 2023-02-07 PROCEDURE — 1036F TOBACCO NON-USER: CPT | Performed by: ORTHOPAEDIC SURGERY

## 2023-02-07 RX ORDER — METHYLPREDNISOLONE 4 MG/1
TABLET ORAL
Qty: 1 KIT | Refills: 0 | Status: SHIPPED | OUTPATIENT
Start: 2023-02-07 | End: 2023-02-08

## 2023-02-08 ENCOUNTER — OFFICE VISIT (OUTPATIENT)
Dept: FAMILY MEDICINE CLINIC | Age: 38
End: 2023-02-08

## 2023-02-08 VITALS
OXYGEN SATURATION: 99 % | HEART RATE: 92 BPM | SYSTOLIC BLOOD PRESSURE: 124 MMHG | BODY MASS INDEX: 42.77 KG/M2 | WEIGHT: 257 LBS | DIASTOLIC BLOOD PRESSURE: 94 MMHG

## 2023-02-08 DIAGNOSIS — E03.9 ACQUIRED HYPOTHYROIDISM: ICD-10-CM

## 2023-02-08 DIAGNOSIS — R03.0 ELEVATED BLOOD PRESSURE READING WITHOUT DIAGNOSIS OF HYPERTENSION: ICD-10-CM

## 2023-02-08 DIAGNOSIS — F41.8 DEPRESSION WITH ANXIETY: ICD-10-CM

## 2023-02-08 DIAGNOSIS — I49.9 IRREGULAR HEARTBEAT: Primary | ICD-10-CM

## 2023-02-08 DIAGNOSIS — R53.83 FATIGUE, UNSPECIFIED TYPE: ICD-10-CM

## 2023-02-08 DIAGNOSIS — E01.0 THYROMEGALY: ICD-10-CM

## 2023-02-08 RX ORDER — HYDROXYZINE HYDROCHLORIDE 25 MG/1
TABLET, FILM COATED ORAL
COMMUNITY
Start: 2022-12-20

## 2023-02-08 RX ORDER — CETIRIZINE HYDROCHLORIDE 10 MG/1
10 TABLET ORAL DAILY
COMMUNITY

## 2023-02-08 SDOH — ECONOMIC STABILITY: TRANSPORTATION INSECURITY
IN THE PAST 12 MONTHS, HAS LACK OF TRANSPORTATION KEPT YOU FROM MEETINGS, WORK, OR FROM GETTING THINGS NEEDED FOR DAILY LIVING?: NO

## 2023-02-08 SDOH — ECONOMIC STABILITY: FOOD INSECURITY: WITHIN THE PAST 12 MONTHS, YOU WORRIED THAT YOUR FOOD WOULD RUN OUT BEFORE YOU GOT MONEY TO BUY MORE.: NEVER TRUE

## 2023-02-08 SDOH — ECONOMIC STABILITY: INCOME INSECURITY: HOW HARD IS IT FOR YOU TO PAY FOR THE VERY BASICS LIKE FOOD, HOUSING, MEDICAL CARE, AND HEATING?: NOT VERY HARD

## 2023-02-08 SDOH — ECONOMIC STABILITY: HOUSING INSECURITY
IN THE LAST 12 MONTHS, WAS THERE A TIME WHEN YOU DID NOT HAVE A STEADY PLACE TO SLEEP OR SLEPT IN A SHELTER (INCLUDING NOW)?: NO

## 2023-02-08 SDOH — ECONOMIC STABILITY: FOOD INSECURITY: WITHIN THE PAST 12 MONTHS, THE FOOD YOU BOUGHT JUST DIDN'T LAST AND YOU DIDN'T HAVE MONEY TO GET MORE.: NEVER TRUE

## 2023-02-08 ASSESSMENT — PATIENT HEALTH QUESTIONNAIRE - PHQ9
3. TROUBLE FALLING OR STAYING ASLEEP: 0
6. FEELING BAD ABOUT YOURSELF - OR THAT YOU ARE A FAILURE OR HAVE LET YOURSELF OR YOUR FAMILY DOWN: 0
8. MOVING OR SPEAKING SO SLOWLY THAT OTHER PEOPLE COULD HAVE NOTICED. OR THE OPPOSITE, BEING SO FIGETY OR RESTLESS THAT YOU HAVE BEEN MOVING AROUND A LOT MORE THAN USUAL: 0
4. FEELING TIRED OR HAVING LITTLE ENERGY: 0
7. TROUBLE CONCENTRATING ON THINGS, SUCH AS READING THE NEWSPAPER OR WATCHING TELEVISION: 0
1. LITTLE INTEREST OR PLEASURE IN DOING THINGS: 0
SUM OF ALL RESPONSES TO PHQ QUESTIONS 1-9: 0
SUM OF ALL RESPONSES TO PHQ QUESTIONS 1-9: 0
10. IF YOU CHECKED OFF ANY PROBLEMS, HOW DIFFICULT HAVE THESE PROBLEMS MADE IT FOR YOU TO DO YOUR WORK, TAKE CARE OF THINGS AT HOME, OR GET ALONG WITH OTHER PEOPLE: 0
SUM OF ALL RESPONSES TO PHQ9 QUESTIONS 1 & 2: 0
5. POOR APPETITE OR OVEREATING: 0
2. FEELING DOWN, DEPRESSED OR HOPELESS: 0
SUM OF ALL RESPONSES TO PHQ QUESTIONS 1-9: 0
SUM OF ALL RESPONSES TO PHQ QUESTIONS 1-9: 0
9. THOUGHTS THAT YOU WOULD BE BETTER OFF DEAD, OR OF HURTING YOURSELF: 0

## 2023-02-08 ASSESSMENT — ENCOUNTER SYMPTOMS
SHORTNESS OF BREATH: 0
VOMITING: 0
NAUSEA: 1

## 2023-02-08 NOTE — PROGRESS NOTES
SUBJECTIVE:  Pt is a of 30 y.o. female comes in today with   Chief Complaint   Patient presents with    Irregular Heart Beat     Pt states her heart beating weird. She gets hot and feels as if she is going to pass out. Patient presenting today for evaluation of irregular heart beat. Sensation of heart beating hard or pounding. Random in occurrence. Can happen at rest or with activity. Mostly with rest. Sensation every 1-2 weeks. Also with fatigue. Poor water drinker. Does not drink caffeine on regular. Symptoms present at least for 1 yr. Resume Adderall 3 weeks ago. Associated with hot flashes and nausea. First episode 2-3 weeks ago. Progressed lightheadedness with blurry vision and near syncope. Sat down for few minutes and symptoms resolved. Entire episode last 10-15 min. No sensation of irregular heart beat then. Having hot flashes frequently with nausea and dizziness but no reoccurrence of near syncope. Denies feeling anxious. Increased stress with marital problems and son's behavior, but states that is not new. Per pt, psychiatrist does not think symptoms are anxiety. Also concerned with enlarged thyroid. U/s in 2020 and told thyroid slightly larger than it should be and nodule present. No follow up since. Occasionally experiences discomfort to right side of neck and \"feels different with swallowing\". Denies pain. Prior to Visit Medications    Medication Sig Taking?  Authorizing Provider   hydrOXYzine HCl (ATARAX) 25 MG tablet TAKE 0.5-1 TAB BY MOUTH DAILY AS NEEDED FOR ANXIETY Yes Historical Provider, MD   cetirizine (ZYRTEC) 10 MG tablet Take 10 mg by mouth daily Yes Historical Provider, MD   vilazodone HCl (VIIBRYD) 10 MG TABS TAKE 1 TABLET BY MOUTH EVERY DAY Yes Historical Provider, MD   levonorgestrel (MIRENA, 52 MG,) IUD 52 mg 1 each by IntraUTERine route Yes Dee Dee Lopez, APRN - CNP   omeprazole (PRILOSEC) 40 MG delayed release capsule Take 1 capsule by mouth Daily TAKE 1 CAPSULE DAILY IN THE MORNING Yes RUPERTO Plunkett CNP   levothyroxine (SYNTHROID) 25 MCG tablet Take 1 tablet by mouth Daily Take 1 tablet by mouth once daily Yes RUPERTO Plunkett CNP   amphetamine-dextroamphetamine (ADDERALL) 20 MG tablet TAKE 1 TABLET BY MOUTH THREE TIMES DAILY Yes Historical Provider, MD   acetaminophen (TYLENOL) 325 MG tablet Take 650 mg by mouth as needed for Pain Yes Historical Provider, MD         Patient's allergies, past medical, surgical, social and family histories werereviewed and updated as appropriate. Review of Systems   Constitutional:  Positive for fatigue. Negative for diaphoresis. Eyes:  Positive for visual disturbance. Respiratory:  Negative for shortness of breath. Cardiovascular:  Positive for palpitations. Negative for chest pain and leg swelling. Gastrointestinal:  Positive for nausea. Negative for vomiting. Endocrine: Positive for heat intolerance. Neurological:  Positive for dizziness and light-headedness. Negative for headaches. Psychiatric/Behavioral:  Negative for agitation and suicidal ideas. The patient is not nervous/anxious. Physical Exam  Vitals reviewed. Constitutional:       Appearance: Normal appearance. She is well-developed. Neck:      Thyroid: No thyromegaly. Cardiovascular:      Rate and Rhythm: Normal rate and regular rhythm. Pulmonary:      Effort: Pulmonary effort is normal.      Breath sounds: Normal breath sounds. Musculoskeletal:      Cervical back: Normal range of motion and neck supple. Skin:     General: Skin is warm and dry. Neurological:      Mental Status: She is alert and oriented to person, place, and time. Psychiatric:         Mood and Affect: Mood normal.         Behavior: Behavior normal.         Thought Content:  Thought content normal.         Judgment: Judgment normal.     Vitals:    02/08/23 0956 02/08/23 1031 02/08/23 1039   BP:  (!) 132/100 (!) 124/94   Pulse: 92     SpO2: 99%     Weight: 257 lb (116.6 kg)               ASSESSMENT:  1. Irregular heartbeat    2. Fatigue, unspecified type    3. Elevated blood pressure reading without diagnosis of hypertension    4. Acquired hypothyroidism    5. Depression with anxiety    6. Thyromegaly        PLAN:  1. Irregular heartbeat  Unclear control and etiology  Awaiting labs  -     EKG 12 lead: normal EKG, normal sinus rhythm, there are no previous tracings available for comparison. Reviewed by Dr. Saul Bowden  -     CBC with Auto Differential; Future  -     Basic Metabolic Panel; Future  -     TSH; Future  -     T4, Free; Future  Criteria for emergent care reviewed  Increase water to 64 oz/day    2. Fatigue, unspecified type  Unclear control/etiology  See # 1    3. Elevated blood pressure reading without diagnosis of hypertension  RTO 2 weeks for BP check, if elevated then will start med  Recommend weight reduction, healthy diet and regular exercise    4. Acquired hypothyroidism  Continue current medication while awaiting TSH results    5. Depression with anxiety  Borderline control  No changes today    6. Thyromegaly  Unclear control, awaiting repeat:  -     US HEAD NECK SOFT TISSUE THYROID; Future    See pt instructions  F/u  2 weeks, sooner prn  Discussed use, benefit, and side effects of prescribed medications. All patient questions answered. Pt voiced understanding.

## 2023-02-13 NOTE — PROGRESS NOTES
11/3/2022      Reason for visit:  Status post left shoulder arthroscopy with distal clavicle excision and subacromial decompression on 10/21/2022     History of Present Illness:  Patient returns for postop evaluation. She does report that since last visit she has had some increasing pain within the shoulder and arm region. Especially with certain movements and activities. The last visit a cortisone injection was given to her subacromial space. She does report about 50% improvement but is still having some pain throughout the shoulder. Objective:     Physical Exam:  The patient is well-appearing and in no apparent distress  Neg Spurling's test  Examination of the left shoulder  There is no swelling, ecchymosis, or gross deformity  There is no evidence of muscle atrophy  + Aviles test, neg Neers test  neg bicipital groove tenderness, no AC joint tenderness  Range of motion of shoulder reveals 140 degrees of forward flexion and abduction with external rotation of about 60 degrees  Intact motor and sensory function throughout the median/radial/ulnar/PIN/AIN distributions  Palpable radial pulse, brisk cap refill, 2+ symmetric reflexes      Imagin view x-rays of the left shoulder were obtained the office today on 11/3/2022 and reviewed. Postoperative changes consistent with distal clavicle excision. There is no fracture or dislocation. Assessment:  Status post left shoulder arthroscopy with distal clavicle excision and subacromial decompression on 10/21/2022     Plan:  Unfortunately patient still has pain. His difficult to discern the exact etiology at this time. We will initiate a Medrol Dosepak at this point. If she does remain symptomatic or if the condition worsens she will call us the next up would be an MRI of the shoulder. Greater than 20 minutes were spent with this encounter.   Time spent included evaluating the patient's chart prior to arrival.  Evaluating the patient in the office including history, physical examination, imaging reviewing, and counseling on next steps. Lastly, time was spent discussing orders with my staff as well as providing documentation in the chart. Virginia Chapin MD            Orthopaedic Surgery Sports Medicine and [address] and [address] [facility]            Team Physician [facility] ([place]        Disclaimer: This note was dictated with voice recognition software. Though review and correction are routine, we apologize for any errors.

## 2023-02-15 DIAGNOSIS — R53.83 FATIGUE, UNSPECIFIED TYPE: Primary | ICD-10-CM

## 2023-02-20 ENCOUNTER — HOSPITAL ENCOUNTER (OUTPATIENT)
Dept: ULTRASOUND IMAGING | Age: 38
Discharge: HOME | End: 2023-02-20
Payer: COMMERCIAL

## 2023-02-20 DIAGNOSIS — E01.0 THYROMEGALY: ICD-10-CM

## 2023-02-20 PROCEDURE — 76536 US EXAM OF HEAD AND NECK: CPT

## 2023-02-22 ENCOUNTER — OFFICE VISIT (OUTPATIENT)
Dept: FAMILY MEDICINE CLINIC | Age: 38
End: 2023-02-22
Payer: COMMERCIAL

## 2023-02-22 VITALS
DIASTOLIC BLOOD PRESSURE: 70 MMHG | WEIGHT: 255.8 LBS | HEART RATE: 75 BPM | BODY MASS INDEX: 42.57 KG/M2 | SYSTOLIC BLOOD PRESSURE: 108 MMHG | OXYGEN SATURATION: 98 %

## 2023-02-22 DIAGNOSIS — R06.02 SOB (SHORTNESS OF BREATH) ON EXERTION: ICD-10-CM

## 2023-02-22 DIAGNOSIS — E66.01 CLASS 3 SEVERE OBESITY DUE TO EXCESS CALORIES WITH SERIOUS COMORBIDITY AND BODY MASS INDEX (BMI) OF 40.0 TO 44.9 IN ADULT (HCC): ICD-10-CM

## 2023-02-22 DIAGNOSIS — R53.83 FATIGUE, UNSPECIFIED TYPE: Primary | ICD-10-CM

## 2023-02-22 DIAGNOSIS — R20.9 COLD HANDS AND FEET: ICD-10-CM

## 2023-02-22 PROCEDURE — G8484 FLU IMMUNIZE NO ADMIN: HCPCS | Performed by: NURSE PRACTITIONER

## 2023-02-22 PROCEDURE — 1036F TOBACCO NON-USER: CPT | Performed by: NURSE PRACTITIONER

## 2023-02-22 PROCEDURE — 99214 OFFICE O/P EST MOD 30 MIN: CPT | Performed by: NURSE PRACTITIONER

## 2023-02-22 PROCEDURE — G8427 DOCREV CUR MEDS BY ELIG CLIN: HCPCS | Performed by: NURSE PRACTITIONER

## 2023-02-22 PROCEDURE — G8417 CALC BMI ABV UP PARAM F/U: HCPCS | Performed by: NURSE PRACTITIONER

## 2023-02-22 ASSESSMENT — PATIENT HEALTH QUESTIONNAIRE - PHQ9
SUM OF ALL RESPONSES TO PHQ9 QUESTIONS 1 & 2: 0
SUM OF ALL RESPONSES TO PHQ QUESTIONS 1-9: 0
1. LITTLE INTEREST OR PLEASURE IN DOING THINGS: 0
7. TROUBLE CONCENTRATING ON THINGS, SUCH AS READING THE NEWSPAPER OR WATCHING TELEVISION: 0
4. FEELING TIRED OR HAVING LITTLE ENERGY: 0
9. THOUGHTS THAT YOU WOULD BE BETTER OFF DEAD, OR OF HURTING YOURSELF: 0
SUM OF ALL RESPONSES TO PHQ QUESTIONS 1-9: 0
6. FEELING BAD ABOUT YOURSELF - OR THAT YOU ARE A FAILURE OR HAVE LET YOURSELF OR YOUR FAMILY DOWN: 0
5. POOR APPETITE OR OVEREATING: 0
10. IF YOU CHECKED OFF ANY PROBLEMS, HOW DIFFICULT HAVE THESE PROBLEMS MADE IT FOR YOU TO DO YOUR WORK, TAKE CARE OF THINGS AT HOME, OR GET ALONG WITH OTHER PEOPLE: 0
SUM OF ALL RESPONSES TO PHQ QUESTIONS 1-9: 0
SUM OF ALL RESPONSES TO PHQ QUESTIONS 1-9: 0
2. FEELING DOWN, DEPRESSED OR HOPELESS: 0
3. TROUBLE FALLING OR STAYING ASLEEP: 0
8. MOVING OR SPEAKING SO SLOWLY THAT OTHER PEOPLE COULD HAVE NOTICED. OR THE OPPOSITE, BEING SO FIGETY OR RESTLESS THAT YOU HAVE BEEN MOVING AROUND A LOT MORE THAN USUAL: 0

## 2023-02-22 ASSESSMENT — ENCOUNTER SYMPTOMS
COUGH: 0
SHORTNESS OF BREATH: 1
CHEST TIGHTNESS: 0

## 2023-02-22 NOTE — PROGRESS NOTES
SUBJECTIVE:  Pt is a of 30 y.o. female comes in today with   Chief Complaint   Patient presents with    Hypertension     2 week follow up       Patient presenting today for evaluation of multiple concerns. Chronic symptoms include: cold sensation to hands and feet, Heart pounding at random times, Dizziness/lightheadedness at random times- most recently after walking up 6 flights of stairs; SOB and heart pounding when walking from couch to driveway and back to couch. States 2 yrs ago this would not happen. Sedentary lifestyle for past 2 yrs. Diet poor. Usually cereal for breakfast, PB&J or lunch meat sandwich for lunch, sometimes salad, dinner take out usually 3-4 x week: [employer] chicken nuggets and french fries. Same complaints as 2 weeks ago. Has increased water but palpitations continue. No checking BP at home. Wt Readings from Last 3 Encounters:   02/22/23 255 lb 12.8 oz (116 kg)   02/08/23 257 lb (116.6 kg)   02/07/23 246 lb (111.6 kg)     Chronic left shoulder pain, unchanged from surgery in October. Associated with worsening decreased ROM. Prior to Visit Medications    Medication Sig Taking?  Authorizing Provider   hydrOXYzine HCl (ATARAX) 25 MG tablet TAKE 0.5-1 TAB BY MOUTH DAILY AS NEEDED FOR ANXIETY Yes Historical Provider, MD   cetirizine (ZYRTEC) 10 MG tablet Take 10 mg by mouth daily Yes Historical Provider, MD   vilazodone HCl (VIIBRYD) 10 MG TABS TAKE 1 TABLET BY MOUTH EVERY DAY Yes Historical Provider, MD   levonorgestrel (MIRENA, 52 MG,) IUD 52 mg 1 each by IntraUTERine route Yes RUPERTO Vergara CNP   omeprazole (PRILOSEC) 40 MG delayed release capsule Take 1 capsule by mouth Daily TAKE 1 CAPSULE DAILY IN THE MORNING Yes RUPERTO Vergara CNP   levothyroxine (SYNTHROID) 25 MCG tablet Take 1 tablet by mouth Daily Take 1 tablet by mouth once daily Yes RUPERTO Vergara CNP   amphetamine-dextroamphetamine (ADDERALL) 20 MG tablet TAKE 1 TABLET BY MOUTH THREE TIMES DAILY Yes Historical Provider, MD   acetaminophen (TYLENOL) 325 MG tablet Take 650 mg by mouth as needed for Pain Yes Historical Provider, MD         Patient's allergies, past medical, surgical, social and family histories werereviewed and updated as appropriate. Review of Systems   Constitutional:  Positive for activity change (decreased), fatigue and unexpected weight change (gain). Negative for diaphoresis. Respiratory:  Positive for shortness of breath. Negative for cough and chest tightness. Cardiovascular:  Positive for palpitations. Negative for chest pain and leg swelling. Musculoskeletal:  Positive for arthralgias (left shoulder pain). Neurological:  Positive for dizziness, light-headedness and headaches. Physical Exam  Vitals reviewed. Constitutional:       Appearance: She is well-developed. She is obese. Cardiovascular:      Rate and Rhythm: Normal rate and regular rhythm. Pulmonary:      Effort: Pulmonary effort is normal.      Breath sounds: Normal breath sounds. Skin:     General: Skin is warm and dry. Neurological:      Mental Status: She is alert and oriented to person, place, and time. Psychiatric:         Mood and Affect: Mood normal.         Behavior: Behavior normal.         Thought Content: Thought content normal.         Judgment: Judgment normal.     Vitals:    02/22/23 1023 02/22/23 1109   BP: 124/88 108/70   Pulse: 75    SpO2: 98%    Weight: 255 lb 12.8 oz (116 kg)              ASSESSMENT:  1. Fatigue, unspecified type    2. Cold hands and feet    3. SOB (shortness of breath) on exertion    4. Class 3 severe obesity due to excess calories with serious comorbidity and body mass index (BMI) of 40.0 to 44.9 in adult [facility])        PLAN:  1. Fatigue, unspecified type  -     Iron and TIBC; Future  -     Vitamin B12 & Folate; Future  -     Ferritin; Future  -     CBC with Auto Differential; Future  Lengthy discussion with pt about lifestyle.  Urge regular activity, and healthier diet. Recommend 5 small meals/day with protein and fruit or veggie with each meal. Limit take out to 1 meal/week. Increase water    2. Cold hands and feet  Likely associated with thyroid  Continue current medication    3. SOB (shortness of breath) on exertion  See # 1    4. Class 3 severe obesity due to excess calories with serious comorbidity and body mass index (BMI) of 40.0 to 44.9 in adult [facility])  Uncontrolled  See # 1    Seeprn  Discussed use, benefit, and side effects of prescribed medications. All patient questions answered. Pt voiced understanding.

## 2023-03-02 DIAGNOSIS — R53.83 FATIGUE, UNSPECIFIED TYPE: ICD-10-CM

## 2023-03-02 LAB
BASOPHILS ABSOLUTE: 0.1 K/UL (ref 0–0.2)
BASOPHILS RELATIVE PERCENT: 1 %
EOSINOPHILS ABSOLUTE: 0.1 K/UL (ref 0–0.6)
EOSINOPHILS RELATIVE PERCENT: 2.2 %
FERRITIN: 46 NG/ML (ref 15–150)
FOLATE: 5.7 NG/ML (ref 4.78–24.2)
HCT VFR BLD CALC: 37.3 % (ref 36–48)
HEMOGLOBIN: 12.3 G/DL (ref 12–16)
IRON SATURATION: 29 % (ref 15–50)
IRON: 129 UG/DL (ref 37–145)
LYMPHOCYTES ABSOLUTE: 2 K/UL (ref 1–5.1)
LYMPHOCYTES RELATIVE PERCENT: 32 %
MCH RBC QN AUTO: 30 PG (ref 26–34)
MCHC RBC AUTO-ENTMCNC: 32.8 G/DL (ref 31–36)
MCV RBC AUTO: 91.5 FL (ref 80–100)
MONOCYTES ABSOLUTE: 0.3 K/UL (ref 0–1.3)
MONOCYTES RELATIVE PERCENT: 5.5 %
NEUTROPHILS ABSOLUTE: 3.6 K/UL (ref 1.7–7.7)
NEUTROPHILS RELATIVE PERCENT: 59.3 %
PDW BLD-RTO: 18.6 % (ref 12.4–15.4)
PLATELET # BLD: 295 K/UL (ref 135–450)
PMV BLD AUTO: 8.1 FL (ref 5–10.5)
RBC # BLD: 4.08 M/UL (ref 4–5.2)
TOTAL IRON BINDING CAPACITY: 445 UG/DL (ref 260–445)
VITAMIN B-12: 821 PG/ML (ref 211–911)
WBC # BLD: 6.1 K/UL (ref 4–11)

## 2023-03-08 ENCOUNTER — TELEMEDICINE (OUTPATIENT)
Dept: FAMILY MEDICINE CLINIC | Age: 38
End: 2023-03-08

## 2023-03-08 DIAGNOSIS — F41.8 DEPRESSION WITH ANXIETY: ICD-10-CM

## 2023-03-08 DIAGNOSIS — E66.01 CLASS 3 SEVERE OBESITY DUE TO EXCESS CALORIES WITH SERIOUS COMORBIDITY AND BODY MASS INDEX (BMI) OF 40.0 TO 44.9 IN ADULT (HCC): Primary | ICD-10-CM

## 2023-03-08 DIAGNOSIS — E03.9 ACQUIRED HYPOTHYROIDISM: ICD-10-CM

## 2023-03-08 ASSESSMENT — ENCOUNTER SYMPTOMS: SHORTNESS OF BREATH: 0

## 2023-03-08 ASSESSMENT — PATIENT HEALTH QUESTIONNAIRE - PHQ9
SUM OF ALL RESPONSES TO PHQ QUESTIONS 1-9: 0
2. FEELING DOWN, DEPRESSED OR HOPELESS: 0
6. FEELING BAD ABOUT YOURSELF - OR THAT YOU ARE A FAILURE OR HAVE LET YOURSELF OR YOUR FAMILY DOWN: 0
SUM OF ALL RESPONSES TO PHQ QUESTIONS 1-9: 0
4. FEELING TIRED OR HAVING LITTLE ENERGY: 0
SUM OF ALL RESPONSES TO PHQ QUESTIONS 1-9: 0
9. THOUGHTS THAT YOU WOULD BE BETTER OFF DEAD, OR OF HURTING YOURSELF: 0
3. TROUBLE FALLING OR STAYING ASLEEP: 0
10. IF YOU CHECKED OFF ANY PROBLEMS, HOW DIFFICULT HAVE THESE PROBLEMS MADE IT FOR YOU TO DO YOUR WORK, TAKE CARE OF THINGS AT HOME, OR GET ALONG WITH OTHER PEOPLE: 0
8. MOVING OR SPEAKING SO SLOWLY THAT OTHER PEOPLE COULD HAVE NOTICED. OR THE OPPOSITE, BEING SO FIGETY OR RESTLESS THAT YOU HAVE BEEN MOVING AROUND A LOT MORE THAN USUAL: 0
SUM OF ALL RESPONSES TO PHQ QUESTIONS 1-9: 0
1. LITTLE INTEREST OR PLEASURE IN DOING THINGS: 0
7. TROUBLE CONCENTRATING ON THINGS, SUCH AS READING THE NEWSPAPER OR WATCHING TELEVISION: 0
5. POOR APPETITE OR OVEREATING: 0
SUM OF ALL RESPONSES TO PHQ9 QUESTIONS 1 & 2: 0

## 2023-03-08 NOTE — PROGRESS NOTES
3/8/2023      TELEHEALTH EVALUATION -- Audio/Visual (During [place]-[facility] public health emergency)    HPI:    Fred Lopez (:  1985) has requested an audio/video evaluation for the following concern(s):    To discuss labs completed 3/2/23. She is concerned folate is low end normal,. She is eating healthier, more fruits, does not like veggies. Spending more time outside. Mood is slightly better. Chronic cold intolerance in feet is unchanged. Review of Systems   Constitutional:  Positive for fatigue (chronic). Respiratory:  Negative for shortness of breath. Cardiovascular:  Negative for chest pain and palpitations. Endocrine: Positive for cold intolerance. Psychiatric/Behavioral:  Negative for suicidal ideas. The patient is not nervous/anxious. Prior to Visit Medications    Medication Sig Taking? Authorizing Provider   hydrOXYzine HCl (ATARAX) 25 MG tablet TAKE 0.5-1 TAB BY MOUTH DAILY AS NEEDED FOR ANXIETY Yes Historical Provider, MD   cetirizine (ZYRTEC) 10 MG tablet Take 10 mg by mouth daily Yes Historical Provider, MD   vilazodone HCl (VIIBRYD) 10 MG TABS TAKE 1 TABLET BY MOUTH EVERY DAY Yes Historical Provider, MD   levonorgestrel (MIRENA, 52 MG,) IUD 52 mg 1 each by IntraUTERine route Yes Isabel Faster, APRN - CNP   omeprazole (PRILOSEC) 40 MG delayed release capsule Take 1 capsule by mouth Daily TAKE 1 CAPSULE DAILY IN THE MORNING Yes Isabel Faster, APRN - CNP   levothyroxine (SYNTHROID) 25 MCG tablet Take 1 tablet by mouth Daily Take 1 tablet by mouth once daily Yes Isabel Faster, APRN - CNP   amphetamine-dextroamphetamine (ADDERALL) 20 MG tablet TAKE 1 TABLET BY MOUTH THREE TIMES DAILY Yes Historical Provider, MD   acetaminophen (TYLENOL) 325 MG tablet Take 650 mg by mouth as needed for Pain Yes Historical Provider, MD       Past Medical History:   Diagnosis Date    Abnormal Pap smear of cervix 2014    prior LEEP.     Arthritis     upper cervical area pain    Carpal tunnel syndrome of right wrist 5/10/2017    Gestational diabetes     pre-diabetic--not medically treated    Hypothyroidism     Iron deficiency anemia     Obesity, unspecified     Pancreatitis     PCOS (polycystic ovarian syndrome)     Sleep apnea     requires CPAP while sleeping. Past Surgical History:   Procedure Laterality Date    CARPAL TUNNEL RELEASE Right 2017    Right carpal tunnel release       SECTION  2016    CHOLECYSTECTOMY, LAPAROSCOPIC  2010    [facility]    ERASMO      [facility]    SHOULDER ARTHROSCOPY Left 10/21/2022    LEFT SHOULDER ARTHROSCOPY; DISTAL CLAVICLE EXCISION-BLOCK performed by Marsha Rosenbaum MD at [address]  2017    laparoscopic - Dr. Jeovanny Ferrara Bilateral     WISDOM TOOTH EXTRACTION         Family History   Problem Relation Age of Onset    High Blood Pressure Mother     Diabetes Maternal Grandfather     High Blood Pressure Maternal Grandfather     Diabetes Maternal Grandmother     Cancer Maternal Grandmother     Alcohol Abuse Father         alcohol     Cancer Paternal Grandmother        No Known Allergies    Social History     Tobacco Use    Smoking status: Never    Smokeless tobacco: Never   Substance Use Topics    Alcohol use: Yes     Alcohol/week: 0.0 standard drinks     Comment: 1 drink every 6 months    Drug use: No          PHYSICAL EXAMINATION:  Vital Signs: (As obtained by patient/caregiver or practitioner observation)  There were no vitals taken for this visit. Respiratory rate appears normal      Constitutional: Appears well-developed and well-nourished. No apparent distress    Mental status: Alert and awake. Oriented to person/place/mindy. Able to follow commands    Eyes: EOM normal. Sclera normal. No discharge visible  HENT: Normocephalic, atraumatic.    Mouth/Throat: Mucous membranes are moist. External Ears Normal    Neck: No visualized mass   Pulmonary/Chest: Respiratory effort normal.  No visualized signs of difficulty breathing or respiratory distress        Musculoskeletal:  Normal range of motion of neck  Neurological:       No Facial Asymmetry (Cranial nerve 7 motor function) (limited exam to video visit). No gaze palsy       Skin:  No significant exanthematous lesions or discoloration noted on facial skin       Psychiatric: Normal Affect. No Hallucinations            ASSESSMENT/PLAN:  1. Class 3 severe obesity due to excess calories with serious comorbidity and body mass index (BMI) of 40.0 to 44.9 in adult ([address]  Continue weight loss efforts with healthier diet and reg exercise    2. Acquired hypothyroidism  Well controlled   No changes today    3. Depression with anxiety  Labs reviewed today  Continue OTC supplements, can start Prenatal if planning for pregnancy    Return if symptoms worsen or fail to improve. Carley Camacho is a 30 y.o. female being evaluated by a Virtual Visit (video visit) encounter to address concerns as mentioned above. A caregiver was present when appropriate. Due to this being a TeleHealth encounter (During [facility]-17 public health emergency), evaluation of the following organ systems was limited: Vitals/Constitutional/EENT/Resp/CV/GI//MS/Neuro/Skin/Heme-Lymph-Imm. Pursuant to the emergency declaration under the [facility], [address] [place] [address] [facility] authority and the [employer] Act, this Virtual Visit was conducted with patient's (and/or legal guardian's) consent, to reduce the patient's risk of exposure to COVID-19 and provide necessary medical care. The patient (and/or legal guardian) has also been advised to contact this office for worsening conditions or problems, and seek emergency medical treatment and/or call 911 if deemed necessary.      Patient identification was verified at the start of the visit: Yes    Total time spent on this encounter: Not billed by time minutes    Services were provided through a video synchronous discussion virtually to substitute for in-person clinic visit. Patient and provider were located at their individual homes. The patient (or guardian if applicable) is aware that this is a billable service, which includes applicable co-pays. This Virtual Visit was conducted with patient's (and/or legal guardian's) consent. The visit was conducted pursuant to the emergency declaration under the [address] [place] [address] and the [employer] Act. Patient identification was verified, and a caregiver was present when appropriate. The patient was located in a state where the provider was licensed to provide care. --Bertha Luna, RUPERTO - CNP on 3/8/2023 at 2:03 PM    An electronic signature was used to authenticate this note. Yue Kumari

## 2023-03-14 ENCOUNTER — OFFICE VISIT (OUTPATIENT)
Dept: FAMILY MEDICINE CLINIC | Age: 38
End: 2023-03-14
Payer: COMMERCIAL

## 2023-03-14 VITALS
HEART RATE: 117 BPM | HEIGHT: 65 IN | OXYGEN SATURATION: 99 % | TEMPERATURE: 98.2 F | BODY MASS INDEX: 42.99 KG/M2 | WEIGHT: 258 LBS | DIASTOLIC BLOOD PRESSURE: 82 MMHG | SYSTOLIC BLOOD PRESSURE: 128 MMHG | RESPIRATION RATE: 18 BRPM

## 2023-03-14 DIAGNOSIS — R05.1 ACUTE COUGH: Primary | ICD-10-CM

## 2023-03-14 PROCEDURE — 99213 OFFICE O/P EST LOW 20 MIN: CPT | Performed by: NURSE PRACTITIONER

## 2023-03-14 PROCEDURE — 1036F TOBACCO NON-USER: CPT | Performed by: NURSE PRACTITIONER

## 2023-03-14 PROCEDURE — G8484 FLU IMMUNIZE NO ADMIN: HCPCS | Performed by: NURSE PRACTITIONER

## 2023-03-14 PROCEDURE — G8417 CALC BMI ABV UP PARAM F/U: HCPCS | Performed by: NURSE PRACTITIONER

## 2023-03-14 PROCEDURE — G8427 DOCREV CUR MEDS BY ELIG CLIN: HCPCS | Performed by: NURSE PRACTITIONER

## 2023-03-14 RX ORDER — PREDNISONE 20 MG/1
40 TABLET ORAL DAILY
Qty: 10 TABLET | Refills: 0 | Status: SHIPPED | OUTPATIENT
Start: 2023-03-14 | End: 2023-03-19

## 2023-03-14 RX ORDER — AZITHROMYCIN 250 MG/1
250 TABLET, FILM COATED ORAL SEE ADMIN INSTRUCTIONS
Qty: 6 TABLET | Refills: 0 | Status: SHIPPED | OUTPATIENT
Start: 2023-03-14 | End: 2023-03-19

## 2023-03-14 ASSESSMENT — ENCOUNTER SYMPTOMS
COUGH: 1
SORE THROAT: 0
RHINORRHEA: 0
WHEEZING: 0
SHORTNESS OF BREATH: 1
CHEST TIGHTNESS: 1

## 2023-03-14 NOTE — PROGRESS NOTES
SUBJECTIVE:  Pt is a of 30 y.o. female comes in today with   Chief Complaint   Patient presents with    Cough     PT states she have chest congestion and cough x 8 days           Home COVID test negative      Cough  This is a new problem. The current episode started 1 to 4 weeks ago (8 days ago). The problem has been gradually worsening. Associated symptoms include shortness of breath. Pertinent negatives include no ear pain, fever, postnasal drip, rhinorrhea, sore throat or wheezing. Treatments tried: Dayquil, Nyquil, Mucinex. There is no history of asthma. Prior to Visit Medications    Medication Sig Taking? Authorizing Provider   hydrOXYzine HCl (ATARAX) 25 MG tablet TAKE 0.5-1 TAB BY MOUTH DAILY AS NEEDED FOR ANXIETY Yes Historical Provider, MD   cetirizine (ZYRTEC) 10 MG tablet Take 10 mg by mouth daily Yes Historical Provider, MD   vilazodone HCl (VIIBRYD) 10 MG TABS TAKE 1 TABLET BY MOUTH EVERY DAY Yes Historical Provider, MD   levonorgestrel (MIRENA, 52 MG,) IUD 52 mg 1 each by IntraUTERine route Yes RUPERTO Calles - CNP   omeprazole (PRILOSEC) 40 MG delayed release capsule Take 1 capsule by mouth Daily TAKE 1 CAPSULE DAILY IN THE MORNING Yes RUPERTO Calles - CNP   levothyroxine (SYNTHROID) 25 MCG tablet Take 1 tablet by mouth Daily Take 1 tablet by mouth once daily Yes RUPERTO Calles - MARLEY   amphetamine-dextroamphetamine (ADDERALL) 20 MG tablet TAKE 1 TABLET BY MOUTH THREE TIMES DAILY Yes Historical Provider, MD   acetaminophen (TYLENOL) 325 MG tablet Take 650 mg by mouth as needed for Pain Yes Historical Provider, MD       Patient's past medical, surgical, social and family histories were reviewed and updated as appropriate. Review of Systems   Constitutional:  Positive for fatigue. Negative for fever. HENT:  Negative for congestion, ear pain, postnasal drip, rhinorrhea and sore throat. Respiratory:  Positive for cough (mostly dry), chest tightness and shortness of breath. Negative for wheezing. Physical Exam  Vitals reviewed. Constitutional:       Appearance: Normal appearance. She is well-developed. HENT:      Mouth/Throat:      Pharynx: No posterior oropharyngeal erythema. Cardiovascular:      Rate and Rhythm: Normal rate and regular rhythm. Pulmonary:      Effort: Pulmonary effort is normal.      Breath sounds: Normal breath sounds. Skin:     General: Skin is warm and dry. Neurological:      Mental Status: She is alert and oriented to person, place, and time. /82   Pulse (!) 117   Temp 98.2 °F (36.8 °C)   Resp 18   Ht 5' 5\" (1.651 m)   Wt 258 lb (117 kg)   LMP  (Exact Date)   SpO2 99%   BMI 42.93 kg/m²       Assessment/Plan    1. Acute cough  Viral vs bacterial etiology reviewed  Trial - predniSONE (DELTASONE) 20 MG tablet; Take 2 tablets by mouth daily for 5 days  Dispense: 10 tablet; Refill: 0  Trial - azithromycin (ZITHROMAX) 250 MG tablet; Take 1 tablet by mouth See Admin Instructions for 5 days 500mg on day 1 followed by 250mg on days 2 - 5  Dispense: 6 tablet; Refill: 0  Symptomatic treatment: Tylenol / Advil, rest, salt water gargles, increase fluids. May also use:Robitussin DM or Delsym. See pt instructions  Discussed use, benefit, and side effects of prescribed medications. All patient questions answered. Pt voiced understanding.

## 2023-03-20 ENCOUNTER — OFFICE VISIT (OUTPATIENT)
Dept: FAMILY MEDICINE CLINIC | Age: 38
End: 2023-03-20
Payer: COMMERCIAL

## 2023-03-20 VITALS
HEIGHT: 65 IN | BODY MASS INDEX: 42.65 KG/M2 | HEART RATE: 81 BPM | WEIGHT: 256 LBS | SYSTOLIC BLOOD PRESSURE: 126 MMHG | DIASTOLIC BLOOD PRESSURE: 82 MMHG | OXYGEN SATURATION: 98 % | TEMPERATURE: 98.2 F

## 2023-03-20 DIAGNOSIS — J40 BRONCHITIS: Primary | ICD-10-CM

## 2023-03-20 PROCEDURE — 1036F TOBACCO NON-USER: CPT | Performed by: NURSE PRACTITIONER

## 2023-03-20 PROCEDURE — G8417 CALC BMI ABV UP PARAM F/U: HCPCS | Performed by: NURSE PRACTITIONER

## 2023-03-20 PROCEDURE — G8427 DOCREV CUR MEDS BY ELIG CLIN: HCPCS | Performed by: NURSE PRACTITIONER

## 2023-03-20 PROCEDURE — 99213 OFFICE O/P EST LOW 20 MIN: CPT | Performed by: NURSE PRACTITIONER

## 2023-03-20 PROCEDURE — G8484 FLU IMMUNIZE NO ADMIN: HCPCS | Performed by: NURSE PRACTITIONER

## 2023-03-20 RX ORDER — BENZONATATE 200 MG/1
200 CAPSULE ORAL 2 TIMES DAILY PRN
Qty: 20 CAPSULE | Refills: 0 | Status: SHIPPED | OUTPATIENT
Start: 2023-03-20 | End: 2023-03-30

## 2023-03-20 ASSESSMENT — PATIENT HEALTH QUESTIONNAIRE - PHQ9
9. THOUGHTS THAT YOU WOULD BE BETTER OFF DEAD, OR OF HURTING YOURSELF: 0
6. FEELING BAD ABOUT YOURSELF - OR THAT YOU ARE A FAILURE OR HAVE LET YOURSELF OR YOUR FAMILY DOWN: 0
4. FEELING TIRED OR HAVING LITTLE ENERGY: 0
1. LITTLE INTEREST OR PLEASURE IN DOING THINGS: 0
10. IF YOU CHECKED OFF ANY PROBLEMS, HOW DIFFICULT HAVE THESE PROBLEMS MADE IT FOR YOU TO DO YOUR WORK, TAKE CARE OF THINGS AT HOME, OR GET ALONG WITH OTHER PEOPLE: 0
SUM OF ALL RESPONSES TO PHQ QUESTIONS 1-9: 6
SUM OF ALL RESPONSES TO PHQ QUESTIONS 1-9: 6
SUM OF ALL RESPONSES TO PHQ9 QUESTIONS 1 & 2: 0
8. MOVING OR SPEAKING SO SLOWLY THAT OTHER PEOPLE COULD HAVE NOTICED. OR THE OPPOSITE, BEING SO FIGETY OR RESTLESS THAT YOU HAVE BEEN MOVING AROUND A LOT MORE THAN USUAL: 0
SUM OF ALL RESPONSES TO PHQ QUESTIONS 1-9: 6
3. TROUBLE FALLING OR STAYING ASLEEP: 3
7. TROUBLE CONCENTRATING ON THINGS, SUCH AS READING THE NEWSPAPER OR WATCHING TELEVISION: 3
5. POOR APPETITE OR OVEREATING: 0
2. FEELING DOWN, DEPRESSED OR HOPELESS: 0
SUM OF ALL RESPONSES TO PHQ QUESTIONS 1-9: 6

## 2023-03-20 ASSESSMENT — ENCOUNTER SYMPTOMS
SORE THROAT: 0
SINUS PRESSURE: 0
SHORTNESS OF BREATH: 1
COUGH: 1
CHEST TIGHTNESS: 1

## 2023-03-20 NOTE — PROGRESS NOTES
surgical, social and family histories were reviewed and updated as appropriate. Review of Systems   Constitutional:  Positive for fatigue. Negative for fever. HENT:  Negative for congestion, ear pain, sinus pressure and sore throat. Respiratory:  Positive for cough, chest tightness and shortness of breath. Neurological:  Negative for headaches. Physical Exam  Vitals reviewed. Constitutional:       Appearance: She is well-developed. She is obese. HENT:      Right Ear: Tympanic membrane normal.      Left Ear: Tympanic membrane normal.      Nose: Nose normal.      Mouth/Throat:      Pharynx: Uvula midline. No oropharyngeal exudate or posterior oropharyngeal erythema. Cardiovascular:      Rate and Rhythm: Normal rate and regular rhythm. Heart sounds: Normal heart sounds. Pulmonary:      Effort: Pulmonary effort is normal.      Breath sounds: Normal breath sounds. No stridor. No wheezing, rhonchi or rales. Lymphadenopathy:      Cervical: No cervical adenopathy. Skin:     General: Skin is warm and dry. Neurological:      Mental Status: She is alert and oriented to person, place, and time. Psychiatric:         Mood and Affect: Mood normal.         Behavior: Behavior normal.         Thought Content: Thought content normal.         Judgment: Judgment normal.     /82   Pulse 81   Temp 98.2 °F (36.8 °C) (Oral)   Ht 5' 5\" (1.651 m)   Wt 256 lb (116.1 kg)   SpO2 98%   BMI 42.60 kg/m²       Assessment/Plan    1. Bronchitis  Discussed with pt, viral in etiology. Stop Mucinex  Trial - benzonatate (TESSALON) 200 MG capsule; Take 1 capsule by mouth 2 times daily as needed for Cough  Dispense: 20 capsule; Refill: 0  Symptomatic treatment: Tylenol / Advil, rest, cough drops, increase fluids. .  See pt instructions  F/u5-7 days if no improvement, sooner if symptoms worsen. Discussed use, benefit, and side effects of prescribed medications. All patient questions answered.   Pt voiced

## 2023-03-26 DIAGNOSIS — K21.9 CHRONIC GERD: ICD-10-CM

## 2023-03-27 RX ORDER — OMEPRAZOLE 40 MG/1
40 CAPSULE, DELAYED RELEASE ORAL DAILY
Qty: 90 CAPSULE | Refills: 1 | Status: SHIPPED | OUTPATIENT
Start: 2023-03-27 | End: 2023-09-23

## 2023-03-27 NOTE — TELEPHONE ENCOUNTER
Medication:   Requested Prescriptions     Pending Prescriptions Disp Refills    omeprazole (PRILOSEC) 40 MG delayed release capsule [Pharmacy Med Name: OMEPRAZOLE DR 40 MG CAPSULE] 90 capsule 1     Sig: TAKE 1 CAPSULE BY MOUTH DAILY TAKE 1 CAPSULE DAILY IN THE MORNING        Last Filled:      Patient Phone Number: [phone] (home)     Last appt: 3/20/2023   Next appt: Visit date not found    Last OARRS: No flowsheet data found.

## 2023-05-16 DIAGNOSIS — E03.9 ACQUIRED HYPOTHYROIDISM: ICD-10-CM

## 2023-05-16 RX ORDER — VALACYCLOVIR HYDROCHLORIDE 1 G/1
TABLET, FILM COATED ORAL
Qty: 4 TABLET | Refills: 0 | Status: SHIPPED | OUTPATIENT
Start: 2023-05-16

## 2023-05-16 RX ORDER — LEVOTHYROXINE SODIUM 0.03 MG/1
TABLET ORAL
Qty: 90 TABLET | Refills: 0 | Status: SHIPPED | OUTPATIENT
Start: 2023-05-16

## 2023-06-18 ENCOUNTER — APPOINTMENT (OUTPATIENT)
Dept: CT IMAGING | Age: 38
End: 2023-06-18
Payer: COMMERCIAL

## 2023-06-18 ENCOUNTER — HOSPITAL ENCOUNTER (EMERGENCY)
Age: 38
Discharge: HOME | End: 2023-06-18
Payer: COMMERCIAL

## 2023-06-18 VITALS
BODY MASS INDEX: 43.3 KG/M2 | SYSTOLIC BLOOD PRESSURE: 137 MMHG | DIASTOLIC BLOOD PRESSURE: 96 MMHG | WEIGHT: 259.9 LBS | HEART RATE: 83 BPM | HEIGHT: 65 IN | RESPIRATION RATE: 20 BRPM | OXYGEN SATURATION: 99 % | TEMPERATURE: 97.7 F

## 2023-06-18 DIAGNOSIS — N28.89 NODULE OF KIDNEY: ICD-10-CM

## 2023-06-18 DIAGNOSIS — R10.9 RIGHT FLANK PAIN: Primary | ICD-10-CM

## 2023-06-18 LAB
ALBUMIN SERPL-MCNC: 4.3 G/DL (ref 3.4–5)
ALBUMIN/GLOB SERPL: 1.3 {RATIO} (ref 1.1–2.2)
ALP SERPL-CCNC: 46 U/L (ref 40–129)
ALT SERPL-CCNC: 42 U/L (ref 10–40)
ANION GAP SERPL CALCULATED.3IONS-SCNC: 12 MMOL/L (ref 3–16)
AST SERPL-CCNC: 33 U/L (ref 15–37)
BASOPHILS # BLD: 0 K/UL (ref 0–0.2)
BASOPHILS NFR BLD: 0.1 %
BILIRUB SERPL-MCNC: 0.3 MG/DL (ref 0–1)
BILIRUB UR QL STRIP.AUTO: NEGATIVE
BUN SERPL-MCNC: 14 MG/DL (ref 7–20)
CALCIUM SERPL-MCNC: 9.1 MG/DL (ref 8.3–10.6)
CHLORIDE SERPL-SCNC: 107 MMOL/L (ref 99–110)
CLARITY UR: CLEAR
CO2 SERPL-SCNC: 22 MMOL/L (ref 21–32)
COLOR UR: YELLOW
CREAT SERPL-MCNC: 0.6 MG/DL (ref 0.6–1.1)
DEPRECATED RDW RBC AUTO: 18.4 % (ref 12.4–15.4)
EOSINOPHIL # BLD: 0.2 K/UL (ref 0–0.6)
EOSINOPHIL NFR BLD: 2.3 %
GFR SERPLBLD CREATININE-BSD FMLA CKD-EPI: >60 ML/MIN/{1.73_M2}
GLUCOSE SERPL-MCNC: 124 MG/DL (ref 70–99)
GLUCOSE UR STRIP.AUTO-MCNC: NEGATIVE MG/DL
HCG SERPL QL: NEGATIVE
HCT VFR BLD AUTO: 36.3 % (ref 36–48)
HGB BLD-MCNC: 12.1 G/DL (ref 12–16)
HGB UR QL STRIP.AUTO: NEGATIVE
KETONES UR STRIP.AUTO-MCNC: ABNORMAL MG/DL
LEUKOCYTE ESTERASE UR QL STRIP.AUTO: NEGATIVE
LIPASE SERPL-CCNC: 35 U/L (ref 13–60)
LYMPHOCYTES # BLD: 2.3 K/UL (ref 1–5.1)
LYMPHOCYTES NFR BLD: 31.3 %
MCH RBC QN AUTO: 32.6 PG (ref 26–34)
MCHC RBC AUTO-ENTMCNC: 33.3 G/DL (ref 31–36)
MCV RBC AUTO: 97.7 FL (ref 80–100)
MONOCYTES # BLD: 0.6 K/UL (ref 0–1.3)
MONOCYTES NFR BLD: 7.6 %
NEUTROPHILS # BLD: 4.4 K/UL (ref 1.7–7.7)
NEUTROPHILS NFR BLD: 58.7 %
NITRITE UR QL STRIP.AUTO: NEGATIVE
PH UR STRIP.AUTO: 6.5 [PH] (ref 5–8)
PLATELET # BLD AUTO: 357 K/UL (ref 135–450)
PMV BLD AUTO: 7.5 FL (ref 5–10.5)
POTASSIUM SERPL-SCNC: 4.1 MMOL/L (ref 3.5–5.1)
PROT SERPL-MCNC: 7.5 G/DL (ref 6.4–8.2)
PROT UR STRIP.AUTO-MCNC: NEGATIVE MG/DL
RBC # BLD AUTO: 3.72 M/UL (ref 4–5.2)
SODIUM SERPL-SCNC: 141 MMOL/L (ref 136–145)
SP GR UR STRIP.AUTO: >=1.03 (ref 1–1.03)
UA COMPLETE W REFLEX CULTURE PNL UR: ABNORMAL
UA DIPSTICK W REFLEX MICRO PNL UR: ABNORMAL
URN SPEC COLLECT METH UR: ABNORMAL
UROBILINOGEN UR STRIP-ACNC: 1 E.U./DL
WBC # BLD AUTO: 7.4 K/UL (ref 4–11)

## 2023-06-18 PROCEDURE — 36415 COLL VENOUS BLD VENIPUNCTURE: CPT

## 2023-06-18 PROCEDURE — 96374 THER/PROPH/DIAG INJ IV PUSH: CPT

## 2023-06-18 PROCEDURE — 84703 CHORIONIC GONADOTROPIN ASSAY: CPT

## 2023-06-18 PROCEDURE — 85025 COMPLETE CBC W/AUTO DIFF WBC: CPT

## 2023-06-18 PROCEDURE — 96375 TX/PRO/DX INJ NEW DRUG ADDON: CPT

## 2023-06-18 PROCEDURE — 6370000000 HC RX 637 (ALT 250 FOR IP): Performed by: PHYSICIAN ASSISTANT

## 2023-06-18 PROCEDURE — 83690 ASSAY OF LIPASE: CPT

## 2023-06-18 PROCEDURE — 74177 CT ABD & PELVIS W/CONTRAST: CPT

## 2023-06-18 PROCEDURE — 99285 EMERGENCY DEPT VISIT HI MDM: CPT

## 2023-06-18 PROCEDURE — 6360000004 HC RX CONTRAST MEDICATION: Performed by: PHYSICIAN ASSISTANT

## 2023-06-18 PROCEDURE — 81003 URINALYSIS AUTO W/O SCOPE: CPT

## 2023-06-18 PROCEDURE — 80053 COMPREHEN METABOLIC PANEL: CPT

## 2023-06-18 PROCEDURE — 6360000002 HC RX W HCPCS: Performed by: PHYSICIAN ASSISTANT

## 2023-06-18 RX ORDER — KETOROLAC TROMETHAMINE 30 MG/ML
INJECTION, SOLUTION INTRAMUSCULAR; INTRAVENOUS
Status: DISCONTINUED
Start: 2023-06-18 | End: 2023-06-19 | Stop reason: HOSPADM

## 2023-06-18 RX ORDER — ACETAMINOPHEN 500 MG
1000 TABLET ORAL ONCE
Status: COMPLETED | OUTPATIENT
Start: 2023-06-18 | End: 2023-06-18

## 2023-06-18 RX ORDER — CYCLOBENZAPRINE HCL 10 MG
10 TABLET ORAL 3 TIMES DAILY PRN
Qty: 20 TABLET | Refills: 0 | Status: SHIPPED | OUTPATIENT
Start: 2023-06-18 | End: 2023-06-28

## 2023-06-18 RX ORDER — DICYCLOMINE HYDROCHLORIDE 10 MG/1
10 CAPSULE ORAL ONCE
Status: COMPLETED | OUTPATIENT
Start: 2023-06-18 | End: 2023-06-18

## 2023-06-18 RX ORDER — NAPROXEN 500 MG/1
500 TABLET ORAL 2 TIMES DAILY
Qty: 20 TABLET | Refills: 0 | Status: SHIPPED | OUTPATIENT
Start: 2023-06-18 | End: 2023-06-28

## 2023-06-18 RX ORDER — CYCLOBENZAPRINE HCL 10 MG
10 TABLET ORAL ONCE
Status: COMPLETED | OUTPATIENT
Start: 2023-06-18 | End: 2023-06-18

## 2023-06-18 RX ORDER — ONDANSETRON 2 MG/ML
4 INJECTION INTRAMUSCULAR; INTRAVENOUS EVERY 30 MIN PRN
Status: DISCONTINUED | OUTPATIENT
Start: 2023-06-18 | End: 2023-06-19 | Stop reason: HOSPADM

## 2023-06-18 RX ORDER — 0.9 % SODIUM CHLORIDE 0.9 %
1000 INTRAVENOUS SOLUTION INTRAVENOUS ONCE
Status: DISCONTINUED | OUTPATIENT
Start: 2023-06-18 | End: 2023-06-19 | Stop reason: HOSPADM

## 2023-06-18 RX ORDER — KETOROLAC TROMETHAMINE 30 MG/ML
15 INJECTION, SOLUTION INTRAMUSCULAR; INTRAVENOUS ONCE
Status: COMPLETED | OUTPATIENT
Start: 2023-06-18 | End: 2023-06-18

## 2023-06-18 RX ORDER — DICYCLOMINE HYDROCHLORIDE 10 MG/1
10 CAPSULE ORAL 4 TIMES DAILY
Qty: 20 CAPSULE | Refills: 0 | Status: SHIPPED | OUTPATIENT
Start: 2023-06-18

## 2023-06-18 RX ADMIN — DICYCLOMINE HYDROCHLORIDE 10 MG: 10 CAPSULE ORAL at 22:56

## 2023-06-18 RX ADMIN — CYCLOBENZAPRINE 10 MG: 10 TABLET, FILM COATED ORAL at 22:57

## 2023-06-18 RX ADMIN — ACETAMINOPHEN 1000 MG: 500 TABLET ORAL at 22:57

## 2023-06-18 RX ADMIN — KETOROLAC TROMETHAMINE 15 MG: 30 INJECTION, SOLUTION INTRAMUSCULAR; INTRAVENOUS at 20:59

## 2023-06-18 RX ADMIN — IOPAMIDOL 75 ML: 755 INJECTION, SOLUTION INTRAVENOUS at 21:48

## 2023-06-18 RX ADMIN — ONDANSETRON 4 MG: 2 INJECTION INTRAMUSCULAR; INTRAVENOUS at 20:58

## 2023-06-18 ASSESSMENT — PAIN SCALES - GENERAL
PAINLEVEL_OUTOF10: 6
PAINLEVEL_OUTOF10: 5

## 2023-06-18 ASSESSMENT — PAIN DESCRIPTION - LOCATION
LOCATION: ABDOMEN
LOCATION: ABDOMEN

## 2023-06-18 ASSESSMENT — PAIN - FUNCTIONAL ASSESSMENT: PAIN_FUNCTIONAL_ASSESSMENT: 0-10

## 2023-06-19 NOTE — ED PROVIDER NOTES
[address] [facility]        Pt Name: Monica De León  MRN: [id number]  [place] 1985  Date of evaluation: 2023  Provider: Jarrod Rowe PA-C  PCP: RUPERTO Gordillo CNP  Note Started: 9:11 PM EDT 23      NELSY. I have evaluated this patient. CHIEF COMPLAINT       Chief Complaint   Patient presents with    Abdominal Pain     Patient states intermittent R sided abd pain that radiates to lower back, patient states pain started today. Patient states pressure makes pain better. Patient states hx of pancreatitis due to gal stones, had gal bladder removed. HISTORY OF PRESENT ILLNESS: 1 or more Elements     History From: patient  Limitations to history : None    Monica De León is a 41 y.o. female who presents to the emergency department the chief complaints of right-sided abdominal pain. She began to have some pain in her right lower back earlier today that began around her side and now is in her right lower abdomen. She is history of sleeve gastrectomy, cholecystectomy and  along with polycystic ovarian syndrome. Denies any history of abdominal surgeries. She denies vomiting but states he does feel slightly nauseous. Denies fevers, chest pain, vaginal bleeding, vaginal discharge, dysuria, hematuria, diarrhea, bloody stool or any other symptoms. States the pain is moderate at a 5 out of 10. Denies any aggravating or alleviating factors. Nursing Notes were all reviewed and agreed with or any disagreements were addressed in the HPI. REVIEW OF SYSTEMS :      Review of Systems    Positives and Pertinent negatives as per HPI.      SURGICAL HISTORY     Past Surgical History:   Procedure Laterality Date    CARPAL TUNNEL RELEASE Right 2017    Right carpal tunnel release       SECTION  2016    CHOLECYSTECTOMY, LAPAROSCOPIC  2010    [facility] ZIMMERMAN      [facility]    SHOULDER ARTHROSCOPY Left

## 2023-06-19 NOTE — ED NOTES
Discharge and education instructions reviewed. Patient verbalized understanding, teach-back successful. Patient denied questions at this time. No acute distress noted. Patient instructed to follow-up as noted - return to emergency department if symptoms worsen. Patient verbalized understanding. Discharged per EDMD with discharged instructions.        Bill Cisse RN  06/18/23 [id number]

## 2023-06-20 ENCOUNTER — OFFICE VISIT (OUTPATIENT)
Dept: FAMILY MEDICINE CLINIC | Age: 38
End: 2023-06-20
Payer: COMMERCIAL

## 2023-06-20 VITALS
HEART RATE: 88 BPM | OXYGEN SATURATION: 100 % | BODY MASS INDEX: 43 KG/M2 | SYSTOLIC BLOOD PRESSURE: 126 MMHG | DIASTOLIC BLOOD PRESSURE: 88 MMHG | WEIGHT: 258.4 LBS

## 2023-06-20 DIAGNOSIS — R10.9 RIGHT FLANK PAIN: ICD-10-CM

## 2023-06-20 DIAGNOSIS — G47.33 SLEEP APNEA, OBSTRUCTIVE: ICD-10-CM

## 2023-06-20 DIAGNOSIS — N28.89 NODULE OF KIDNEY: Primary | ICD-10-CM

## 2023-06-20 DIAGNOSIS — R53.82 CHRONIC FATIGUE: ICD-10-CM

## 2023-06-20 DIAGNOSIS — K21.9 CHRONIC GERD: ICD-10-CM

## 2023-06-20 PROCEDURE — G8417 CALC BMI ABV UP PARAM F/U: HCPCS | Performed by: NURSE PRACTITIONER

## 2023-06-20 PROCEDURE — G8427 DOCREV CUR MEDS BY ELIG CLIN: HCPCS | Performed by: NURSE PRACTITIONER

## 2023-06-20 PROCEDURE — 99214 OFFICE O/P EST MOD 30 MIN: CPT | Performed by: NURSE PRACTITIONER

## 2023-06-20 PROCEDURE — 1036F TOBACCO NON-USER: CPT | Performed by: NURSE PRACTITIONER

## 2023-06-20 RX ORDER — OMEPRAZOLE 40 MG/1
40 CAPSULE, DELAYED RELEASE ORAL DAILY
Qty: 90 CAPSULE | Refills: 1 | Status: SHIPPED | OUTPATIENT
Start: 2023-06-20 | End: 2023-12-17

## 2023-06-20 RX ORDER — LISDEXAMFETAMINE DIMESYLATE 50 MG
50 CAPSULE ORAL EVERY MORNING
COMMUNITY
Start: 2023-06-06

## 2023-06-20 RX ORDER — DIAZEPAM 5 MG/1
TABLET ORAL
Qty: 1 TABLET | Refills: 0 | Status: SHIPPED | OUTPATIENT
Start: 2023-06-20 | End: 2023-06-25 | Stop reason: SDUPTHER

## 2023-06-20 ASSESSMENT — PATIENT HEALTH QUESTIONNAIRE - PHQ9
2. FEELING DOWN, DEPRESSED OR HOPELESS: 0
8. MOVING OR SPEAKING SO SLOWLY THAT OTHER PEOPLE COULD HAVE NOTICED. OR THE OPPOSITE, BEING SO FIGETY OR RESTLESS THAT YOU HAVE BEEN MOVING AROUND A LOT MORE THAN USUAL: 0
1. LITTLE INTEREST OR PLEASURE IN DOING THINGS: 0
SUM OF ALL RESPONSES TO PHQ QUESTIONS 1-9: 0
10. IF YOU CHECKED OFF ANY PROBLEMS, HOW DIFFICULT HAVE THESE PROBLEMS MADE IT FOR YOU TO DO YOUR WORK, TAKE CARE OF THINGS AT HOME, OR GET ALONG WITH OTHER PEOPLE: 0
5. POOR APPETITE OR OVEREATING: 0
SUM OF ALL RESPONSES TO PHQ QUESTIONS 1-9: 0
3. TROUBLE FALLING OR STAYING ASLEEP: 0
6. FEELING BAD ABOUT YOURSELF - OR THAT YOU ARE A FAILURE OR HAVE LET YOURSELF OR YOUR FAMILY DOWN: 0
SUM OF ALL RESPONSES TO PHQ9 QUESTIONS 1 & 2: 0
SUM OF ALL RESPONSES TO PHQ QUESTIONS 1-9: 0
SUM OF ALL RESPONSES TO PHQ QUESTIONS 1-9: 0
7. TROUBLE CONCENTRATING ON THINGS, SUCH AS READING THE NEWSPAPER OR WATCHING TELEVISION: 0
9. THOUGHTS THAT YOU WOULD BE BETTER OFF DEAD, OR OF HURTING YOURSELF: 0
4. FEELING TIRED OR HAVING LITTLE ENERGY: 0

## 2023-06-20 ASSESSMENT — ENCOUNTER SYMPTOMS
NAUSEA: 0
BACK PAIN: 0
VOMITING: 0
DIARRHEA: 0
CONSTIPATION: 0
ABDOMINAL PAIN: 0
SHORTNESS OF BREATH: 0

## 2023-06-20 NOTE — PROGRESS NOTES
mouth daily Yes Historical Provider, MD   vilazodone HCl (VIIBRYD) 10 MG TABS TAKE 1 TABLET BY MOUTH EVERY DAY Yes Historical Provider, MD   levonorgestrel (MIRENA, 52 MG,) IUD 52 mg 1 each by IntraUTERine route Yes RUPERTO Irvin CNP   acetaminophen (TYLENOL) 325 MG tablet Take 2 tablets by mouth as needed for Pain Yes Historical Provider, MD         Patient's allergies, past medical, surgical, social and family histories werereviewed and updated as appropriate. Review of Systems   Constitutional:  Positive for fatigue. Negative for chills and fever. Respiratory:  Negative for shortness of breath. Cardiovascular:  Negative for chest pain and palpitations. Gastrointestinal:  Negative for abdominal pain, constipation, diarrhea, nausea and vomiting. Genitourinary:  Positive for flank pain (gradually improving). Negative for dysuria, frequency, hematuria and urgency. Musculoskeletal:  Negative for back pain. Psychiatric/Behavioral:  Positive for sleep disturbance. Negative for agitation. The patient is not nervous/anxious. Physical Exam  Vitals reviewed. Constitutional:       Appearance: She is well-developed. She is obese. Cardiovascular:      Rate and Rhythm: Normal rate and regular rhythm. Pulmonary:      Effort: Pulmonary effort is normal.      Breath sounds: Normal breath sounds. Skin:     General: Skin is warm and dry. Neurological:      Mental Status: She is alert and oriented to person, place, and time. Psychiatric:         Mood and Affect: Mood normal.         Behavior: Behavior normal.         Thought Content: Thought content normal.         Judgment: Judgment normal.     Vitals:    06/20/23 0911   BP: 126/88   Pulse: 88   SpO2: 100%   Weight: 258 lb 6.4 oz (117.2 kg)             ASSESSMENT:  1. Nodule of kidney    2. Chronic GERD    3. Right flank pain    4. Chronic fatigue    5. Sleep apnea, obstructive        PLAN:  1.  Nodule of kidney  Unclear control  Awaiting

## 2023-06-23 DIAGNOSIS — N28.89 NODULE OF KIDNEY: ICD-10-CM

## 2023-06-23 RX ORDER — DIAZEPAM 5 MG/1
TABLET ORAL
Qty: 1 TABLET | Refills: 0 | Status: CANCELLED | OUTPATIENT
Start: 2023-06-23 | End: 2023-07-23

## 2023-06-25 DIAGNOSIS — N28.89 NODULE OF KIDNEY: ICD-10-CM

## 2023-06-25 RX ORDER — DIAZEPAM 5 MG/1
TABLET ORAL
Qty: 2 TABLET | Refills: 0 | Status: SHIPPED | OUTPATIENT
Start: 2023-06-25 | End: 2023-07-25

## 2023-06-27 ENCOUNTER — HOSPITAL ENCOUNTER (OUTPATIENT)
Dept: MRI IMAGING | Age: 38
Discharge: HOME | End: 2023-06-27

## 2023-06-27 DIAGNOSIS — N28.89 NODULE OF KIDNEY: ICD-10-CM

## 2023-06-27 RX ORDER — DIAZEPAM 10 MG/1
TABLET ORAL
Qty: 2 TABLET | Refills: 0 | Status: SHIPPED | OUTPATIENT
Start: 2023-06-27 | End: 2023-07-27

## 2023-07-12 ENCOUNTER — TELEPHONE (OUTPATIENT)
Dept: FAMILY MEDICINE CLINIC | Age: 38
End: 2023-07-12

## 2023-07-12 DIAGNOSIS — E03.9 ACQUIRED HYPOTHYROIDISM: Primary | ICD-10-CM

## 2023-07-17 ENCOUNTER — HOSPITAL ENCOUNTER (OUTPATIENT)
Dept: MRI IMAGING | Age: 38
Discharge: HOME | End: 2023-07-17
Payer: COMMERCIAL

## 2023-07-17 PROCEDURE — 74183 MRI ABD W/O CNTR FLWD CNTR: CPT

## 2023-07-17 PROCEDURE — 6360000004 HC RX CONTRAST MEDICATION: Performed by: NURSE PRACTITIONER

## 2023-07-17 PROCEDURE — A9579 GAD-BASE MR CONTRAST NOS,1ML: HCPCS | Performed by: NURSE PRACTITIONER

## 2023-07-17 RX ADMIN — GADOTERIDOL 20 ML: 279.3 INJECTION, SOLUTION INTRAVENOUS at 09:54

## 2023-07-20 ENCOUNTER — TELEPHONE (OUTPATIENT)
Dept: ENDOCRINOLOGY | Age: 38
End: 2023-07-20

## 2023-08-19 DIAGNOSIS — E03.9 ACQUIRED HYPOTHYROIDISM: ICD-10-CM

## 2023-08-21 RX ORDER — LEVOTHYROXINE SODIUM 0.03 MG/1
TABLET ORAL
Qty: 90 TABLET | Refills: 0 | Status: SHIPPED | OUTPATIENT
Start: 2023-08-21

## 2023-10-03 ENCOUNTER — TELEPHONE (OUTPATIENT)
Dept: FAMILY MEDICINE CLINIC | Age: 38
End: 2023-10-03

## 2023-10-03 NOTE — TELEPHONE ENCOUNTER
Not sure who this gets forwarded to.  It is request for complete medical records, dates listed on form

## 2023-10-06 ENCOUNTER — OFFICE VISIT (OUTPATIENT)
Dept: ENDOCRINOLOGY | Age: 38
End: 2023-10-06
Payer: COMMERCIAL

## 2023-10-06 VITALS
WEIGHT: 249.8 LBS | RESPIRATION RATE: 16 BRPM | SYSTOLIC BLOOD PRESSURE: 118 MMHG | BODY MASS INDEX: 41.62 KG/M2 | HEIGHT: 65 IN | HEART RATE: 60 BPM | DIASTOLIC BLOOD PRESSURE: 91 MMHG

## 2023-10-06 DIAGNOSIS — E06.3 HYPOTHYROIDISM DUE TO HASHIMOTO'S THYROIDITIS: ICD-10-CM

## 2023-10-06 DIAGNOSIS — E03.8 HYPOTHYROIDISM DUE TO HASHIMOTO'S THYROIDITIS: ICD-10-CM

## 2023-10-06 DIAGNOSIS — E03.8 HYPOTHYROIDISM DUE TO HASHIMOTO'S THYROIDITIS: Primary | ICD-10-CM

## 2023-10-06 DIAGNOSIS — E28.2 PCOS (POLYCYSTIC OVARIAN SYNDROME): ICD-10-CM

## 2023-10-06 DIAGNOSIS — E55.9 HYPOVITAMINOSIS D: ICD-10-CM

## 2023-10-06 DIAGNOSIS — E06.3 HYPOTHYROIDISM DUE TO HASHIMOTO'S THYROIDITIS: Primary | ICD-10-CM

## 2023-10-06 PROCEDURE — 99204 OFFICE O/P NEW MOD 45 MIN: CPT | Performed by: INTERNAL MEDICINE

## 2023-10-06 RX ORDER — VALACYCLOVIR HYDROCHLORIDE 1 G/1
TABLET, FILM COATED ORAL
Qty: 4 TABLET | Refills: 0 | Status: SHIPPED | OUTPATIENT
Start: 2023-10-06

## 2023-10-06 RX ORDER — CYCLOBENZAPRINE HCL 10 MG
10 TABLET ORAL 3 TIMES DAILY PRN
COMMUNITY

## 2023-10-06 RX ORDER — METHYLPHENIDATE HYDROCHLORIDE 20 MG/1
TABLET ORAL
COMMUNITY
Start: 2023-03-01

## 2023-10-06 RX ORDER — PRENATAL SUPPLEMENT WITH DHA 1100; 30; 1.6; 1.8; 15; 2.5; .012; 1; .15; 20; 25; 2; 1000; 200; 20; 29 [IU]/1; MG/1; MG/1; MG/1; MG/1; MG/1; MG/1; MG/1; MG/1; MG/1; MG/1; MG/1; [IU]/1; MG/1; [IU]/1; MG/1
1 CAPSULE, GELATIN COATED ORAL DAILY
COMMUNITY
Start: 2023-08-16

## 2023-10-06 NOTE — PROGRESS NOTES
[facility]        Chief Complaint   Patient presents with    New Patient     hypothyroid       Vangie Simon is a pleasant 41y.o. year old female here to establish care for hypothyroidism. She reports that she would like to have her hormones checked. Patient has history of hypothyroidism due to Hashimoto's thyroiditis, PCOS, GERD, depression, anxiety, ADHD, prediabetes and has a BMI of 43. She had prior history of sleeve gastrectomy in 2017, cholecystectomy and . Patient was diagnosed with hypothyroidism back in . She was noted to have mild elevation in TSH around 4.7 from . This has been accompanied with elevated TPO antibody. Thyroid ultrasound completed in 2023 showed mildly enlarged right thyroid lobe with otherwise normal sonographic appearance of thyroid. No nodules appreciated. She had been on various doses of levothyroxine. She is currently on levothyroxine 25 mcg daily. She reports consistent with intake since .  She reports palpitations and tremors. She was diagnosed with PCOS in  after presenting with irregular cycles and hirsutism. She has 1 son who is 7 and needed Clomid at that time to get pregnant. She had gestational DM during her pregnancy. This was treated with metformin. She recently had her IUD removed in 2023 to try to attempt pregnancy. LMP was 23. No breast pain or discharge. She had gastric sleeve in . Highest weight reported was 320 lbs during pregnancy, pre-surgery 290 lbs, bijan of around 210 lbs. Current  weight is 249 lbs. For a year now, she had been feeling very tired and sore muscles, excessive sleepiness as well as heat intolerance. She reports that she feels dizzy and tired if in the heat. No smoking, drinks alcohol rarely. She has 1 son who is 7. She does not smoke and drinks alcohol rarely. No illicit drug use.       ALLERGIES:  No Known Allergies     MEDICATIONS:  Outpatient

## 2023-10-06 NOTE — TELEPHONE ENCOUNTER
Medication:   Requested Prescriptions     Pending Prescriptions Disp Refills    valACYclovir (VALTREX) 1 g tablet [Pharmacy Med Name: VALACYCLOVIR HCL 1 GRAM TABLET] 4 tablet 0     Sig: TAKE 2 TABLETS BY MOUTH TWICE A DAY FOR 1 DAY        Last Filled:  5/16/2023    Patient Phone Number: [phone] (home)     Last appt: 6/20/2023   Next appt: Visit date not found    Last OARRS:        No data to display

## 2023-10-07 LAB
25(OH)D3 SERPL-MCNC: 48.4 NG/ML
EST. AVERAGE GLUCOSE BLD GHB EST-MCNC: 108.3 MG/DL
HBA1C MFR BLD: 5.4 %
T4 FREE SERPL-MCNC: 1.2 NG/DL (ref 0.9–1.8)
TSH SERPL DL<=0.005 MIU/L-ACNC: 1.71 UIU/ML (ref 0.27–4.2)

## 2023-10-10 LAB
INSULIN COMMENT: NORMAL
INSULIN REFERENCE RANGE:: NORMAL
INSULIN SERPL-ACNC: 11 MU/L
TESTOST SERPL-MCNC: 44 NG/DL (ref 20–70)

## 2023-10-12 RX ORDER — METFORMIN HYDROCHLORIDE 500 MG/1
500 TABLET, EXTENDED RELEASE ORAL
Qty: 90 TABLET | Refills: 1 | Status: SHIPPED | OUTPATIENT
Start: 2023-10-12

## 2023-10-18 ENCOUNTER — TELEPHONE (OUTPATIENT)
Dept: FAMILY MEDICINE CLINIC | Age: 38
End: 2023-10-18

## 2023-10-18 ENCOUNTER — OFFICE VISIT (OUTPATIENT)
Dept: FAMILY MEDICINE CLINIC | Age: 38
End: 2023-10-18
Payer: COMMERCIAL

## 2023-10-18 VITALS
SYSTOLIC BLOOD PRESSURE: 138 MMHG | DIASTOLIC BLOOD PRESSURE: 88 MMHG | HEART RATE: 107 BPM | OXYGEN SATURATION: 99 % | RESPIRATION RATE: 16 BRPM | WEIGHT: 249.8 LBS | BODY MASS INDEX: 41.57 KG/M2

## 2023-10-18 DIAGNOSIS — R00.2 PALPITATIONS: Primary | ICD-10-CM

## 2023-10-18 PROCEDURE — 93000 ELECTROCARDIOGRAM COMPLETE: CPT | Performed by: NURSE PRACTITIONER

## 2023-10-18 PROCEDURE — 99214 OFFICE O/P EST MOD 30 MIN: CPT | Performed by: NURSE PRACTITIONER

## 2023-10-18 ASSESSMENT — ENCOUNTER SYMPTOMS
VOMITING: 0
SHORTNESS OF BREATH: 0
NAUSEA: 1

## 2023-10-18 NOTE — PROGRESS NOTES
SUBJECTIVE:  Pt is a of 41 y.o. female comes in today with   Chief Complaint   Patient presents with    Palpitations     Pt states she get lightheaded when palpitations are happening. She feels like she is going to pass out. Presenting today for evaluation of palpitations. Ongoing since having COVID 2 yrs ago. Initial eval with me in Feb. Random in occurrence. Can happen with rest or activity. Associated with feeling dizzy/lightheadedness, feeling hot, feeling clammy, and nausea. Palpitations described as slow HR, hard pounding in chest with chest tightness. Typically lasts less than 30 min. Fatigued once episodes resolve  Will experience symptoms every 2-3 days. Good water drinker. Avoiding caffeine. She is compliant with Ritalin- symptoms present prior to starting. Prior to Visit Medications    Medication Sig Taking?  Authorizing Provider   metFORMIN (GLUCOPHAGE-XR) 500 MG extended release tablet Take 1 tablet by mouth 2 times daily (before meals) Yes Natanael Gonzáles MD   methylphenidate (RITALIN) 20 MG tablet Twice a day Yes ProviderLexx MD   Prenatal Vit-FePoly-FA-DHA (VITAFOL-ONE) 29-1-200 MG CAPS Take 1 tablet by mouth daily Yes ProviderLexx MD   cyclobenzaprine (FLEXERIL) 10 MG tablet Take 1 tablet by mouth 3 times daily as needed for Muscle spasms Yes ProviderLexx MD   L-Methylfolate-Algae (L-METHYLFOLATE FORMULA 7.5 PO) Take by mouth Yes ProviderLexx MD   valACYclovir (VALTREX) 1 g tablet TAKE 2 TABLETS BY MOUTH TWICE A DAY FOR 1 DAY Yes Myranda Kelly MD   levothyroxine (SYNTHROID) 25 MCG tablet TAKE 1 TABLET BY MOUTH EVERY DAY Yes Myranda Kelly MD   omeprazole (PRILOSEC) 40 MG delayed release capsule Take 1 capsule by mouth Daily TAKE 1 CAPSULE DAILY IN THE MORNING Yes Isabel Armen Clubs, APRN - CNP   hydrOXYzine HCl (ATARAX) 25 MG tablet TAKE 0.5-1 TAB BY MOUTH DAILY AS NEEDED FOR ANXIETY Yes Lexx Ayala MD   cetirizine (ZYRTEC) 10 MG tablet Take 1

## 2023-10-18 NOTE — TELEPHONE ENCOUNTER
[facility] transfer to Nurse triage    Patient is calling with complaint of dizziness ,palpitations sweating with activities   , tiredness     This has been going on  1 years     Patient is scheduled today      Has patient tried any over the counter medications?  no

## 2023-10-20 ENCOUNTER — TELEPHONE (OUTPATIENT)
Dept: FAMILY MEDICINE CLINIC | Age: 38
End: 2023-10-20

## 2023-10-23 NOTE — TELEPHONE ENCOUNTER
Patient: Stan Clinton Date: 2022   : 1964 Attending: Aston Temple MD   58 year old male      Chief Complaint:  Status epilepticus    Subjective / recent events:     Awake, nonverbal. At baseline mentation-> speaks few words. Does not initiate but will respond    Seen examined   Underwent a tube change with EGD  Otherwise appears comfortable      Problem List:   Patient Active Problem List   Diagnosis   • Urinary tract infection without hematuria   • Seizure-like activity (CMS/HCC)   • Multiple sclerosis, primary progressive (CMS/HCC)       Allergies: ALLERGIES:  No Known Allergies    Medications/Infusions:   Current Facility-Administered Medications   Medication Dose Route Frequency Provider Last Rate Last Admin   • sodium chloride (PF) 0.9 % injection 10 mL  10 mL Injection 2 times per day Aba Bush MD       • cefepime (MAXIPIME) 2,000 mg in sodium chloride 0.9 % 100 mL IVPB  2,000 mg Intravenous 3 times per day Aba Bush MD 25 mL/hr at 22 2,000 mg at 22   • sodium chloride (PF) 0.9 % injection 10 mL  10 mL Injection 2 times per day Aba Bush MD   10 mL at 22   • levETIRAcetam (KepPRA) tablet 500 mg  500 mg PEG Tube 2 times per day Aston Temple MD   500 mg at 2246   • melatonin tablet 3 mg  3 mg Per G Tube QHS Rene Pressleyritrever, DO   3 mg at 22   • senna (SENOKOT) 8.6 mg  1 tablet Per G Tube Daily Rene Robles, DO   8.6 mg at 22   • sertraline (ZOLOFT) tablet 100 mg  100 mg Per G Tube Nightly Rene Pressleyritrever, DO   100 mg at 22   • cholecalciferol (VITAMIN D) 10 mcg (400 units)/mL oral liquid 25 mcg  25 mcg Per G Tube Daily Rene Pressleyrie, DO   25 mcg at 22   • baclofen (LIORESAL) tablet 20 mg  20 mg Per G Tube TID Rene Robles, DO   20 mg at 22   • polyethylene glycol (MIRALAX) packet 17 g  17 g Per G Tube Daily Rene Robles DO   17 g at 22  Form faxed to [facility] [phone]   • docusate sodium (COLACE) 50 MG/5ML liquid 100 mg  100 mg Per G Tube Nightly Rene Beaudrie, DO   100 mg at 12/12/22 2036   • lactulose (CHRONULAC) 10 GM/15ML solution 10 g  10 g Per G Tube Q6H Rene Beaudrie, DO   10 g at 12/13/22 0624   • enoxaparin (LOVENOX) injection 40 mg  40 mg Subcutaneous Q24H Rene Beaudrie, DO   40 mg at 12/11/22 1806   • sodium chloride (PF) 0.9 % injection 2 mL  2 mL Intracatheter 2 times per day Aston Temple MD   2 mL at 12/11/22 2034         Review of Systems: All systems reviewed and negative except for those mentioned in the history of present illness                Vital Last Value 24 Hour Range   Temperature 97.2 °F (36.2 °C) (12/13/22 1456) Temp  Min: 97.2 °F (36.2 °C)  Max: 99 °F (37.2 °C)   Pulse 78 (12/13/22 1456) Pulse  Min: 72  Max: 90   Respiratory 16 (12/13/22 1456) Resp  Min: 7  Max: 18   Non-Invasive  Blood Pressure 136/85 (12/13/22 1456) BP  Min: 109/68  Max: 136/85   Pulse Oximetry 97 % (12/13/22 1456) SpO2  Min: 97 %  Max: 100 %     Vital Today Admitted   Weight 62.6 kg (138 lb 0.1 oz) (12/03/22 2100) Weight: 62.6 kg (138 lb 0.1 oz) (12/03/22 1513)   Height N/A Height: 5' 11\" (180.3 cm) (12/03/22 1513)   BMI N/A BMI (Calculated): 19.25 (12/03/22 1513)       Intake/Output:      Intake/Output Summary (Last 24 hours) at 12/13/2022 1710  Last data filed at 12/13/2022 1200  Gross per 24 hour   Intake 1096 ml   Output 1700 ml   Net -604 ml       Physical Exam:   General appearance: chronic ill, NAD  Head: Normocephalic, without obvious abnormality, atraumatic  Lungs: clear to auscultation bilaterally  Chest wall: no tenderness  Heart: regular rate and rhythm, S1, S2 normal, no murmur, click, rub or gallop  Abdomen: Soft, non-tender; bowel sounds normal; no masses, no hepatosplenomegaly and PEG tube  Extremities: no edema or cyanosis, atrophy   Skin: Skin color, texture, turgor normal. No rashes or lesions  Neurologic: Encephalopathy, nonverbal,  MS    Laboratory Results:   Recent Labs   Lab 12/13/22  1505   WBC 7.2   HCT 36.1*   HGB 11.7*      SODIUM 140   POTASSIUM 4.1   CHLORIDE 108   CO2 27   CALCIUM 9.2   GLUCOSE 88   BUN 16   CREATININE 0.52*   AST 28   GPT 55   ALKPT 142*   BILIRUBIN 0.7   ALBUMIN 3.1*       Imaging:   XR ABDOMEN AP KUB 12/3/22:  FINDINGS/IMPRESSION: A G-tube is identified. There is a moderate amount of colonic stool. There is a nonobstructive bowel gas pattern. No other radiopaque foreign bodies are identified.     MRI BRAIN WO CONTRAST 12/3/22:  IMPRESSION: Significant motion degradation limiting the exam. There are 2 areas of possible diffusion restriction. Although technically indeterminant, diffusion restriction related to demyelination was present previously. Active demyelination cannot be excluded. Ischemia is less likely.           Assessment:    1. Recurrent seizures, status epilepticus  2. Acute pseudomonas/proteus UTI with sepsis: Present on admission  3. Neurogenic bladder  4. Multiple sclerosis  5. Toxic and Metabolic encephalopathy  6. Dysphagia, peg tube malfunction status post EGD in change  7. Staph/capitis bacteremia- contamination         Plan:  Antibiotic per ID   keppra per neurology. Follow up with usual neuro outpatient  Resume tube feeding  q.2 hours turning  PPI for GI, Lovenox for DVT  Code status: \"Do Not Resuscitate\"   for discharge planning[facility]. Will need PICC if planned for 4 weeks antibiotic

## 2023-10-25 ENCOUNTER — PATIENT MESSAGE (OUTPATIENT)
Dept: FAMILY MEDICINE CLINIC | Age: 38
End: 2023-10-25

## 2023-10-25 NOTE — TELEPHONE ENCOUNTER
From: Emilie Hernnadez  To: Marissadottie Barriga  Sent: 10/25/2023 12:06 PM EDT  Subject: Heart Monitor- Insurance issue    Juan Greenwood,   My insurance did not receive a prior authorization for the monitor yet. I want to make sure I have the process correct before I schedule to get it on and then insurance doesnt approve it. First, we needed to wait for Insurance to approve it, because they may not and I may have to go to a cardiologist instead. Second, someone would reach out to me to get it scheduled to be put on. ( I thought they may call either way just to let me know if approved or not)- and if I hadn't heard anything then I was to give [address] [facility] a call. Which I did today. [facility] gave me the number to the [employer], which they wanted to set up a time to get the monitor fitted, but could not tell if Insurance was approved or not for it. Told me to call my insurance and then I can call them back. I called insurance and they have not received anything for authorization. If the office needs to send the prior authorization again, the fax number is [phone] or their number to talk with someone is [phone]. It has been happening almost every day, 3 different times yesterday thru out the day/night. Please let me know if there was something I was supposed to do right away that I didn't hear correctly.        Thank you,  Jay Oconnell

## 2023-10-26 ENCOUNTER — TELEPHONE (OUTPATIENT)
Dept: FAMILY MEDICINE CLINIC | Age: 38
End: 2023-10-26

## 2023-10-26 NOTE — TELEPHONE ENCOUNTER
I spoke with the patient regarding her prior authorization for cardiac monitoring she would like to call her insurance to verify before her appt

## 2023-10-26 NOTE — TELEPHONE ENCOUNTER
No authorization is needed for the referral with [phone] cardiac monitoring the letter will be faxed to this office  The location for service was not on the referral

## 2023-10-31 ENCOUNTER — NURSE ONLY (OUTPATIENT)
Dept: CARDIOLOGY CLINIC | Age: 38
End: 2023-10-31

## 2023-10-31 NOTE — PROGRESS NOTES
30 Day MCOT requested for pt. Device was registered and placed. Tutorial given, pt verbalized understanding.  Date-10/31/2023    Time-0300 Pm  E/O-[id number]   rylie

## 2023-11-22 DIAGNOSIS — E03.9 ACQUIRED HYPOTHYROIDISM: ICD-10-CM

## 2023-11-22 RX ORDER — LEVOTHYROXINE SODIUM 0.03 MG/1
TABLET ORAL
Qty: 90 TABLET | Refills: 0 | Status: SHIPPED | OUTPATIENT
Start: 2023-11-22

## 2023-11-22 NOTE — TELEPHONE ENCOUNTER
Medication:   Requested Prescriptions     Pending Prescriptions Disp Refills    levothyroxine (SYNTHROID) 25 MCG tablet [Pharmacy Med Name: LEVOTHYROXINE 25 MCG TABLET] 90 tablet 0     Sig: TAKE 1 TABLET BY MOUTH EVERY DAY       Last Filled:      Patient Phone Number: [phone] (home)     Last appt: 10/18/2023   Next appt: Visit date not found    Last Thyroid:   Lab Results   Component Value Date/Time    TSH 1.71 10/06/2023 12:22 PM    FT3 2.9 12/03/2012 01:22 PM    T4FREE 1.2 10/06/2023 12:22 PM    Z3RNNSD 7.7 04/08/2022 11:31 AM

## 2023-12-15 DIAGNOSIS — K21.9 CHRONIC GERD: ICD-10-CM

## 2023-12-15 RX ORDER — OMEPRAZOLE 40 MG/1
40 CAPSULE, DELAYED RELEASE ORAL EVERY MORNING
Qty: 90 CAPSULE | Refills: 1 | Status: SHIPPED | OUTPATIENT
Start: 2023-12-15

## 2023-12-15 NOTE — TELEPHONE ENCOUNTER
Medication:   Requested Prescriptions     Pending Prescriptions Disp Refills    omeprazole (PRILOSEC) 40 MG delayed release capsule [Pharmacy Med Name: OMEPRAZOLE DR 40 MG CAPSULE] 90 capsule 1     Sig: TAKE 1 CAPSULE BY MOUTH EVERY MORNING        Last Filled:  6/20/2023, 90, 1    Patient Phone Number: [phone] (home)     Last appt: 10/18/2023   Next appt: Visit date not found    Last OARRS:        No data to display

## 2023-12-29 ENCOUNTER — PATIENT MESSAGE (OUTPATIENT)
Dept: ENDOCRINOLOGY | Age: 38
End: 2023-12-29

## 2023-12-29 DIAGNOSIS — E03.8 HYPOTHYROIDISM DUE TO HASHIMOTO'S THYROIDITIS: Primary | ICD-10-CM

## 2023-12-29 DIAGNOSIS — E06.3 HYPOTHYROIDISM DUE TO HASHIMOTO'S THYROIDITIS: Primary | ICD-10-CM

## 2024-01-02 NOTE — TELEPHONE ENCOUNTER
From: Mallika Pete  To: Dr. Karishma Oviedo  Sent: 12/29/2023 2:03 PM EST  Subject: I am pregnant    Hello Dr. Giron,   You had told me to let you know when I do get pregnant, and we just found out a week ago. I have not had my confirmation appointment yet with the OB, it is scheduled for the 3rd. But wanted to let you know and see if there was blood work or anything you wanted me to do now, or later on for the thyroid.   With my last pregnancy I had diabetes and was on medication and then had to be put on insulin. Hopefully it doesn’t happen this time, but just so you know since you started me on Metformin when we met.    Please let me know if there is anything you need me to do.     Thank you,  Mallika

## 2024-01-04 ENCOUNTER — OFFICE VISIT (OUTPATIENT)
Dept: FAMILY MEDICINE CLINIC | Age: 39
End: 2024-01-04
Payer: COMMERCIAL

## 2024-01-04 VITALS
TEMPERATURE: 98.3 F | OXYGEN SATURATION: 100 % | WEIGHT: 235.8 LBS | RESPIRATION RATE: 18 BRPM | SYSTOLIC BLOOD PRESSURE: 108 MMHG | HEART RATE: 104 BPM | BODY MASS INDEX: 39.24 KG/M2 | DIASTOLIC BLOOD PRESSURE: 74 MMHG

## 2024-01-04 DIAGNOSIS — J10.1 INFLUENZA A: Primary | ICD-10-CM

## 2024-01-04 DIAGNOSIS — J00 NASOPHARYNGITIS: ICD-10-CM

## 2024-01-04 DIAGNOSIS — Z3A.01 LESS THAN 8 WEEKS GESTATION OF PREGNANCY: ICD-10-CM

## 2024-01-04 LAB
INFLUENZA A ANTIGEN, POC: NORMAL
INFLUENZA B ANTIGEN, POC: NORMAL

## 2024-01-04 PROCEDURE — 99213 OFFICE O/P EST LOW 20 MIN: CPT | Performed by: NURSE PRACTITIONER

## 2024-01-04 PROCEDURE — 87804 INFLUENZA ASSAY W/OPTIC: CPT | Performed by: NURSE PRACTITIONER

## 2024-01-04 ASSESSMENT — ENCOUNTER SYMPTOMS
WHEEZING: 0
CHEST TIGHTNESS: 0
SORE THROAT: 1
SINUS PRESSURE: 1
RHINORRHEA: 1
SHORTNESS OF BREATH: 1
COUGH: 1

## 2024-01-04 ASSESSMENT — PATIENT HEALTH QUESTIONNAIRE - PHQ9: DEPRESSION UNABLE TO ASSESS: URGENT/EMERGENT SITUATION

## 2024-01-04 NOTE — PROGRESS NOTES
Patient's past medical, surgical, social and family histories were reviewed and updated as appropriate.      Review of Systems   Constitutional:  Positive for chills, fatigue and fever.   HENT:  Positive for congestion, ear pain (right), postnasal drip, rhinorrhea, sinus pressure, sore throat and tinnitus.    Respiratory:  Positive for cough and shortness of breath. Negative for chest tightness and wheezing.    Neurological:  Positive for dizziness and headaches.        Syncopal episode 3 days ago after taking shower, thinks episode lasted < 10 min.          Physical Exam  Vitals reviewed.   Constitutional:       Appearance: She is well-developed.   HENT:      Right Ear: Tympanic membrane is bulging. Tympanic membrane is not erythematous.      Left Ear: Tympanic membrane normal.      Nose: Nose normal.      Mouth/Throat:      Pharynx: Uvula midline. No oropharyngeal exudate.   Cardiovascular:      Rate and Rhythm: Normal rate and regular rhythm.      Heart sounds: Normal heart sounds.   Pulmonary:      Effort: Pulmonary effort is normal.      Breath sounds: Normal breath sounds.   Lymphadenopathy:      Cervical: No cervical adenopathy.   Skin:     General: Skin is warm and dry.   Neurological:      Mental Status: She is alert and oriented to person, place, and time.   Psychiatric:         Mood and Affect: Mood normal.         Behavior: Behavior normal.         Thought Content: Thought content normal.         Judgment: Judgment normal.       /74   Pulse (!) 104   Temp 98.3 °F (36.8 °C)   Resp 18   Wt 107 kg (235 lb 12.8 oz)   LMP  (Exact Date)   SpO2 100%   BMI 39.24 kg/m²       Assessment/Plan    1. Influenza A  Symptomatic treatment: Tylenol / Advil, rest, salt water gargles, increase fluids.  May also use: Robitussin DM or Delsym.  Can make appointment of symptoms worsen or fail to improve over the next 3-4 days.  Most viral illnesses last 7-10 days, and antibiotics do not help.  Out of the

## 2024-01-08 ENCOUNTER — HOSPITAL ENCOUNTER (OUTPATIENT)
Age: 39
Discharge: HOME | End: 2024-01-08
Payer: COMMERCIAL

## 2024-01-08 ENCOUNTER — TELEPHONE (OUTPATIENT)
Dept: FAMILY MEDICINE CLINIC | Age: 39
End: 2024-01-08

## 2024-01-08 DIAGNOSIS — E03.8 HYPOTHYROIDISM DUE TO HASHIMOTO'S THYROIDITIS: ICD-10-CM

## 2024-01-08 DIAGNOSIS — E06.3 HYPOTHYROIDISM DUE TO HASHIMOTO'S THYROIDITIS: ICD-10-CM

## 2024-01-08 LAB
6-ACETYLMORPHINE, UR: ABNORMAL
AMPHETAMINE SCREEN URINE: ABNORMAL
BARBITURATE SCREEN URINE: ABNORMAL
BASOPHILS ABSOLUTE: 0 %
BASOPHILS ABSOLUTE: 0 THOU/MCL (ref 0–0.2)
BENZODIAZEPINE SCREEN, URINE: ABNORMAL
BUPRENORPHINE: ABNORMAL
CANNABINOID SCREEN URINE: ABNORMAL
CREATININE URINE: 467 MG/DL
EOSINOPHILS ABSOLUTE: 0 THOU/MCL (ref 0.03–0.45)
EOSINOPHILS RELATIVE PERCENT: 0 %
ESTIMATED AVERAGE GLUCOSE: 111 MG/DL
FENTANYL URINE: ABNORMAL
HBA1C MFR BLD: 5.5 % (ref 4.2–5.6)
HCT VFR BLD CALC: 35.2 % (ref 36–46)
HEMOGLOBIN: 11.6 G/DL (ref 12–15.2)
HEPATITIS C VIRUS RNA SER/PLAS NCNC: NONREACTIVE
HIV 1+2 AB+HIV1P24 AG, EIA: NONREACTIVE
LYMPHOCYTES ABSOLUTE: 1.3 THOU/MCL (ref 1–4)
LYMPHOCYTES RELATIVE PERCENT: 24 %
Lab: ABNORMAL
MCH RBC QN AUTO: 29.1 PG (ref 27–33)
MCHC RBC AUTO-ENTMCNC: 32.9 G/DL (ref 32–36)
MCV RBC AUTO: 88.5 FL (ref 82–97)
METHADONE SCREEN, URINE: ABNORMAL
MONOCYTES # BLD: 5 %
MONOCYTES ABSOLUTE: 0.2 THOU/MCL (ref 0.2–0.9)
NEUTROPHILS ABSOLUTE: 3.8 THOU/MCL (ref 1.8–7.7)
OPIATE SCREEN URINE: ABNORMAL
OXYCODONE: ABNORMAL
PDW BLD-RTO: 14.3 % (ref 12.3–17)
PLATELET # BLD: 285 THOU/MCL (ref 140–375)
PMV BLD AUTO: 8.3 FL (ref 7.4–11.5)
RBC # BLD: 3.97 MIL/MCL (ref 3.8–5.2)
SEG NEUTROPHILS: 71 %
T4 FREE SERPL-MCNC: 1.2 NG/DL (ref 0.9–1.8)
TREPONEMA PALLIDUM ANTIBODIES: 0.05 INDEX VALUE
TSH SERPL DL<=0.005 MIU/L-ACNC: 1.28 UIU/ML (ref 0.27–4.2)
TSH ULTRASENSITIVE: 1.23 MCIU/ML (ref 0.27–4.2)
WBC # BLD: 5.4 THOU/MCL (ref 3.6–10.5)

## 2024-01-08 PROCEDURE — 36415 COLL VENOUS BLD VENIPUNCTURE: CPT

## 2024-01-08 PROCEDURE — 84443 ASSAY THYROID STIM HORMONE: CPT

## 2024-01-08 PROCEDURE — 84439 ASSAY OF FREE THYROXINE: CPT

## 2024-01-08 NOTE — TELEPHONE ENCOUNTER
This will just need to run it's course. Follow up with OB in regards to over the counter meds she can take  I do not recommend Tessalon, it has not been studied in pregnancy

## 2024-01-08 NOTE — TELEPHONE ENCOUNTER
Pt said she seen you on 1/4/24 she said she is still feeling bad after the 10 days. She said her nose is congested and is hard to breathe. is she able to take the tessalon perls? Are they safe to take while pregnant ?

## 2024-01-08 NOTE — TELEPHONE ENCOUNTER
It can take few weeks. Nasal congestion is likely exacerbated by pregnancy. Many women experience nasal congestion when pregnant. Recommend vaporizer in room at night when sleeping. Use vapor rub

## 2024-01-09 DIAGNOSIS — E06.3 HYPOTHYROIDISM DUE TO HASHIMOTO'S THYROIDITIS: Primary | ICD-10-CM

## 2024-01-09 DIAGNOSIS — E03.8 HYPOTHYROIDISM DUE TO HASHIMOTO'S THYROIDITIS: Primary | ICD-10-CM

## 2024-01-19 LAB
ABO GROUPING: NORMAL
ANION GAP SERPL CALCULATED.3IONS-SCNC: 7 MMOL/L (ref 4–16)
ANTIBODY SCREEN: NEGATIVE
BASOPHILS ABSOLUTE: 0.1 THOU/MCL (ref 0–0.2)
BASOPHILS ABSOLUTE: 1 %
BUN BLDV-MCNC: 8 MG/DL (ref 8–26)
CALCIUM SERPL-MCNC: 8.7 MG/DL (ref 8.5–10.4)
CHLORIDE BLD-SCNC: 104 MEQ/L (ref 98–111)
CO2: 25 MMOL/L (ref 21–31)
CREAT SERPL-MCNC: 0.59 MG/DL (ref 0.6–1.2)
EGFR (CKD-EPI): 119 ML/MIN/1.73 M2
EOSINOPHILS ABSOLUTE: 0 THOU/MCL (ref 0.03–0.45)
EOSINOPHILS RELATIVE PERCENT: 1 %
ESTIMATED AVERAGE GLUCOSE: 100 MG/DL
FOLATE: >20 NG/ML
GLUCOSE BLD-MCNC: 80 MG/DL (ref 70–99)
HBA1C MFR BLD: 5.1 % (ref 4.2–5.6)
HCT VFR BLD CALC: 34.1 % (ref 36–46)
HEMOGLOBIN: 11.1 G/DL (ref 12–15.2)
HEPATITIS B SURF AG,XHBAGS: NONREACTIVE
HIV 1+2 AB+HIV1P24 AG, EIA: NONREACTIVE
LYMPHOCYTES ABSOLUTE: 1.9 THOU/MCL (ref 1–4)
LYMPHOCYTES RELATIVE PERCENT: 33 %
MCH RBC QN AUTO: 28.6 PG (ref 27–33)
MCHC RBC AUTO-ENTMCNC: 32.5 G/DL (ref 32–36)
MCV RBC AUTO: 88.2 FL (ref 82–97)
MONOCYTES # BLD: 8 %
MONOCYTES ABSOLUTE: 0.5 THOU/MCL (ref 0.2–0.9)
NEUTROPHILS ABSOLUTE: 3.2 THOU/MCL (ref 1.8–7.7)
PDW BLD-RTO: 15.4 % (ref 12.3–17)
PLATELET # BLD: 404 THOU/MCL (ref 140–375)
PMV BLD AUTO: 8.3 FL (ref 7.4–11.5)
POTASSIUM SERPL-SCNC: 4.2 MEQ/L (ref 3.6–5.1)
RBC # BLD: 3.86 MIL/MCL (ref 3.8–5.2)
RUBELLA ANTIBODY IGG: 1.11 INDEX
SEG NEUTROPHILS: 57 %
SODIUM BLD-SCNC: 136 MEQ/L (ref 135–145)
SPECIMEN EXPIRATION: NORMAL
TREPONEMA PALLIDUM ANTIBODIES: 0.03 INDEX VALUE
TSH ULTRASENSITIVE: 1.42 MCIU/ML (ref 0.27–4.2)
VITAMIN B-12: 605 PG/ML (ref 180–914)
VITAMIN D 25-HYDROXY: 32.8 NG/ML (ref 30–150)
WBC # BLD: 5.6 THOU/MCL (ref 3.6–10.5)

## 2024-01-24 RX ORDER — METFORMIN HYDROCHLORIDE 500 MG/1
1000 TABLET, EXTENDED RELEASE ORAL
Qty: 90 TABLET | Refills: 1 | OUTPATIENT
Start: 2024-01-24

## 2024-02-09 DIAGNOSIS — E28.2 PCOS (POLYCYSTIC OVARIAN SYNDROME): Primary | ICD-10-CM

## 2024-02-09 LAB
CHLAMYDIA TRACHOMATIS AMPLIFIED DET: NOT DETECTED
HB: SOURCE: NORMAL
N GONORRHOEAE AMPLIFIED DET: NOT DETECTED
SPECIMEN TYPE: NORMAL

## 2024-02-09 RX ORDER — METFORMIN HYDROCHLORIDE 500 MG/1
1000 TABLET, EXTENDED RELEASE ORAL
Qty: 90 TABLET | Refills: 1 | Status: SHIPPED | OUTPATIENT
Start: 2024-02-09

## 2024-02-09 NOTE — TELEPHONE ENCOUNTER
Requested Prescriptions     Pending Prescriptions Disp Refills    metFORMIN (GLUCOPHAGE-XR) 500 MG extended release tablet [Pharmacy Med Name: METFORMIN HCL  MG TABLET] 90 tablet 1     Sig: TAKE 1 TABLET BY MOUTH TWICE A DAY BEFORE MEALS

## 2024-02-16 ENCOUNTER — TELEPHONE (OUTPATIENT)
Dept: ENDOCRINOLOGY | Age: 39
End: 2024-02-16

## 2024-02-16 NOTE — TELEPHONE ENCOUNTER
When calling pt to resched her appt on 4/11 she states she is 12 weeks pregnant so she's not sure how often she will need appts?    # [phone]

## 2024-02-20 DIAGNOSIS — E06.3 HYPOTHYROIDISM DUE TO HASHIMOTO'S THYROIDITIS: Primary | ICD-10-CM

## 2024-02-20 DIAGNOSIS — E03.8 HYPOTHYROIDISM DUE TO HASHIMOTO'S THYROIDITIS: Primary | ICD-10-CM

## 2024-02-20 NOTE — PROGRESS NOTES
I would like to see patient in 1 month (around 3/20-25).  She needs to complete thyroid labs before then.  Order has been placed starting on 3/20/2024.  Will discuss during that time the frequency to obtain labs, usually every 2 months and plans for follow-up.

## 2024-02-29 ENCOUNTER — HOSPITAL ENCOUNTER (OUTPATIENT)
Age: 39
Discharge: HOME | End: 2024-02-29
Payer: COMMERCIAL

## 2024-02-29 DIAGNOSIS — E03.8 HYPOTHYROIDISM DUE TO HASHIMOTO'S THYROIDITIS: ICD-10-CM

## 2024-02-29 DIAGNOSIS — E03.8 HYPOTHYROIDISM DUE TO HASHIMOTO'S THYROIDITIS: Primary | ICD-10-CM

## 2024-02-29 DIAGNOSIS — E06.3 HYPOTHYROIDISM DUE TO HASHIMOTO'S THYROIDITIS: ICD-10-CM

## 2024-02-29 DIAGNOSIS — E06.3 HYPOTHYROIDISM DUE TO HASHIMOTO'S THYROIDITIS: Primary | ICD-10-CM

## 2024-02-29 LAB
T4 FREE SERPL-MCNC: 1.1 NG/DL (ref 0.9–1.8)
TSH SERPL DL<=0.005 MIU/L-ACNC: 1.16 UIU/ML (ref 0.27–4.2)

## 2024-02-29 PROCEDURE — 36415 COLL VENOUS BLD VENIPUNCTURE: CPT

## 2024-02-29 PROCEDURE — 84443 ASSAY THYROID STIM HORMONE: CPT

## 2024-02-29 PROCEDURE — 84439 ASSAY OF FREE THYROXINE: CPT

## 2024-03-21 ENCOUNTER — OFFICE VISIT (OUTPATIENT)
Dept: ENDOCRINOLOGY | Age: 39
End: 2024-03-21
Payer: COMMERCIAL

## 2024-03-21 VITALS
HEIGHT: 65 IN | WEIGHT: 247 LBS | SYSTOLIC BLOOD PRESSURE: 116 MMHG | HEART RATE: 63 BPM | DIASTOLIC BLOOD PRESSURE: 78 MMHG | BODY MASS INDEX: 41.15 KG/M2

## 2024-03-21 DIAGNOSIS — E28.2 PCOS (POLYCYSTIC OVARIAN SYNDROME): ICD-10-CM

## 2024-03-21 DIAGNOSIS — O99.282 HYPOTHYROIDISM DURING PREGNANCY IN SECOND TRIMESTER: ICD-10-CM

## 2024-03-21 DIAGNOSIS — E06.3 HYPOTHYROIDISM DUE TO HASHIMOTO'S THYROIDITIS: Primary | ICD-10-CM

## 2024-03-21 DIAGNOSIS — E03.8 HYPOTHYROIDISM DUE TO HASHIMOTO'S THYROIDITIS: Primary | ICD-10-CM

## 2024-03-21 DIAGNOSIS — E03.8 HYPOTHYROIDISM DUE TO HASHIMOTO'S THYROIDITIS: ICD-10-CM

## 2024-03-21 DIAGNOSIS — E03.9 HYPOTHYROIDISM DURING PREGNANCY IN SECOND TRIMESTER: ICD-10-CM

## 2024-03-21 DIAGNOSIS — E06.3 HYPOTHYROIDISM DUE TO HASHIMOTO'S THYROIDITIS: ICD-10-CM

## 2024-03-21 LAB
T4 FREE SERPL-MCNC: 1.1 NG/DL (ref 0.9–1.8)
TSH SERPL DL<=0.005 MIU/L-ACNC: 2 UIU/ML (ref 0.27–4.2)

## 2024-03-21 PROCEDURE — 99214 OFFICE O/P EST MOD 30 MIN: CPT | Performed by: INTERNAL MEDICINE

## 2024-03-21 NOTE — PROGRESS NOTES
[facility] End[facility]        Chief Complaint   Patient presents with    Thyroid Problem    Follow-up     3.5 months pregnant        Mallika Pete is a pleasant 38 y.o. year old female here for follow up of hypothyroidism.  She is currently pregnant 16+6 w. DELLA 24.     Patient has history of hypothyroidism due to Hashimoto's thyroiditis, PCOS, GERD, depression, anxiety, ADHD, prediabetes and has a BMI of 41.  She had prior history of sleeve gastrectomy in 2017, cholecystectomy and .    Patient was diagnosed with hypothyroidism back in .   She was noted to have mild elevation in TSH around 4.7 from .  This has been accompanied with elevated TPO antibody.  Thyroid ultrasound completed in 2023 showed mildly enlarged right thyroid lobe with otherwise normal sonographic appearance of thyroid.  No nodules appreciated.  She had been on various doses of levothyroxine.  She is currently on levothyroxine 25 mcg daily. She reports consistent with intake since .  She reports palpitations and tremors.  Most recent TSH was 1.1 from 2024.    She was diagnosed with PCOS in  after presenting with irregular cycles and hirsutism. She has 1 son who is 7 and needed Clomid at that time to get pregnant.  She had gestational DM during her pregnancy. This was treated with metformin and subsequently glyburide and insulin.  Currently, she continues to be on metformin extended release 1000 mg twice daily.  She is following with [facility] in regard to glycemic control during pregnancy.  Fasting blood sugars are below 90 and postprandial blood sugars are at target for the most part.  She reports nausea and low appetite.  She mainly eats buffalo chicken.  She eats only few bites before feeling full.    She had gastric sleeve in . Highest weight reported was 320 lbs during pregnancy, pre-surgery 290 lbs, bijan of around 210 lbs. Current  weight is 247 lbs.  She has 1 son who is 7.  She does not

## 2024-04-18 DIAGNOSIS — E03.9 ACQUIRED HYPOTHYROIDISM: ICD-10-CM

## 2024-04-18 RX ORDER — LEVOTHYROXINE SODIUM 0.03 MG/1
TABLET ORAL
Qty: 90 TABLET | Refills: 3 | Status: SHIPPED | OUTPATIENT
Start: 2024-04-18

## 2024-04-18 NOTE — TELEPHONE ENCOUNTER
Medication:   Requested Prescriptions     Pending Prescriptions Disp Refills    levothyroxine (SYNTHROID) 25 MCG tablet [Pharmacy Med Name: LEVOTHYROXINE 25 MCG TABLET] 90 tablet 0     Sig: TAKE 1 TABLET BY MOUTH EVERY DAY       Last Filled:      Patient Phone Number: [phone] (home)     Last appt: 1/4/2024   Next appt: Visit date not found    Last Thyroid:   Lab Results   Component Value Date/Time    TSH 2.00 03/21/2024 11:13 AM    FT3 2.9 12/03/2012 01:22 PM    T4FREE 1.1 03/21/2024 11:13 AM    V5LEXMZ 7.7 04/08/2022 11:31 AM

## 2024-05-21 DIAGNOSIS — E06.3 HYPOTHYROIDISM DUE TO HASHIMOTO'S THYROIDITIS: ICD-10-CM

## 2024-05-21 DIAGNOSIS — E03.8 HYPOTHYROIDISM DUE TO HASHIMOTO'S THYROIDITIS: ICD-10-CM

## 2024-05-21 LAB
T4 FREE SERPL-MCNC: 1 NG/DL (ref 0.9–1.8)
TSH SERPL DL<=0.005 MIU/L-ACNC: 1.97 UIU/ML (ref 0.27–4.2)

## 2024-05-22 DIAGNOSIS — E28.2 PCOS (POLYCYSTIC OVARIAN SYNDROME): ICD-10-CM

## 2024-05-23 ENCOUNTER — PATIENT MESSAGE (OUTPATIENT)
Dept: ENDOCRINOLOGY | Age: 39
End: 2024-05-23

## 2024-05-23 DIAGNOSIS — E28.2 PCOS (POLYCYSTIC OVARIAN SYNDROME): ICD-10-CM

## 2024-05-23 RX ORDER — METFORMIN HYDROCHLORIDE 500 MG/1
1000 TABLET, EXTENDED RELEASE ORAL
Qty: 90 TABLET | Refills: 0 | Status: SHIPPED | OUTPATIENT
Start: 2024-05-23

## 2024-05-23 RX ORDER — METFORMIN HYDROCHLORIDE 500 MG/1
1000 TABLET, EXTENDED RELEASE ORAL
Qty: 90 TABLET | Refills: 1 | OUTPATIENT
Start: 2024-05-23

## 2024-05-23 NOTE — TELEPHONE ENCOUNTER
From: Mallika Pete  Sent: 5/23/2024 12:39 PM EDT  To: Pranav  Endocrinology Clinical Staff  Subject: Medication Refill    Thank you for responding. They have it as it did not have refills with the prescription in February, so they had requested again. Can you please send in a new prescription, I am down to my last day, and I have gestational diabetes that has been under control with the medicine and I don’t want to chance that getting out of control with missing doses.     Thank you  Mallika

## 2024-06-05 DIAGNOSIS — K21.9 CHRONIC GERD: ICD-10-CM

## 2024-06-06 RX ORDER — OMEPRAZOLE 40 MG/1
40 CAPSULE, DELAYED RELEASE ORAL EVERY MORNING
Qty: 90 CAPSULE | Refills: 0 | Status: SHIPPED | OUTPATIENT
Start: 2024-06-06

## 2024-06-06 NOTE — TELEPHONE ENCOUNTER
Medication:   Requested Prescriptions     Pending Prescriptions Disp Refills    omeprazole (PRILOSEC) 40 MG delayed release capsule [Pharmacy Med Name: OMEPRAZOLE DR 40 MG CAPSULE] 90 capsule 1     Sig: TAKE 1 CAPSULE BY MOUTH EVERY DAY IN THE MORNING        Last Filled:      Patient Phone Number: [phone] (home)     Last appt: 1/4/2024   Next appt: Visit date not found    Last OARRS:        No data to display

## 2024-06-11 DIAGNOSIS — E28.2 PCOS (POLYCYSTIC OVARIAN SYNDROME): ICD-10-CM

## 2024-06-11 RX ORDER — METFORMIN HYDROCHLORIDE 500 MG/1
TABLET, EXTENDED RELEASE ORAL
Qty: 120 TABLET | Refills: 3 | Status: SHIPPED | OUTPATIENT
Start: 2024-06-11

## 2024-07-17 DIAGNOSIS — E03.8 HYPOTHYROIDISM DUE TO HASHIMOTO'S THYROIDITIS: ICD-10-CM

## 2024-07-17 DIAGNOSIS — E06.3 HYPOTHYROIDISM DUE TO HASHIMOTO'S THYROIDITIS: ICD-10-CM

## 2024-07-18 DIAGNOSIS — E03.9 HYPOTHYROIDISM (ACQUIRED): Primary | ICD-10-CM

## 2024-07-18 LAB
T4 FREE SERPL-MCNC: 1 NG/DL (ref 0.9–1.8)
TSH SERPL DL<=0.005 MIU/L-ACNC: 1.48 UIU/ML (ref 0.27–4.2)

## 2024-08-22 DIAGNOSIS — E28.2 PCOS (POLYCYSTIC OVARIAN SYNDROME): ICD-10-CM

## 2024-08-22 RX ORDER — METFORMIN HYDROCHLORIDE 500 MG/1
TABLET, EXTENDED RELEASE ORAL
Qty: 180 TABLET | Refills: 1 | Status: SHIPPED | OUTPATIENT
Start: 2024-08-22

## 2024-08-27 ENCOUNTER — TELEPHONE (OUTPATIENT)
Dept: FAMILY MEDICINE CLINIC | Age: 39
End: 2024-08-27

## 2024-09-06 DIAGNOSIS — K21.9 CHRONIC GERD: ICD-10-CM

## 2024-09-06 RX ORDER — OMEPRAZOLE 40 MG/1
40 CAPSULE, DELAYED RELEASE ORAL EVERY MORNING
Qty: 90 CAPSULE | Refills: 0 | Status: SHIPPED | OUTPATIENT
Start: 2024-09-06

## 2024-09-06 NOTE — TELEPHONE ENCOUNTER
Medication:   Requested Prescriptions     Pending Prescriptions Disp Refills    omeprazole (PRILOSEC) 40 MG delayed release capsule [Pharmacy Med Name: OMEPRAZOLE DR 40 MG CAPSULE] 90 capsule 0     Sig: TAKE 1 CAPSULE BY MOUTH EVERY DAY IN THE MORNING     Last Filled:  06/06/2024    Last appt: 1/4/2024   Next appt: Visit date not found    Last OARRS:        No data to display

## 2024-10-04 DIAGNOSIS — E03.9 HYPOTHYROIDISM (ACQUIRED): ICD-10-CM

## 2024-10-04 LAB
T4 FREE SERPL-MCNC: 1.1 NG/DL (ref 0.9–1.8)
TSH SERPL DL<=0.005 MIU/L-ACNC: 1.81 UIU/ML (ref 0.27–4.2)

## 2024-10-10 ENCOUNTER — OFFICE VISIT (OUTPATIENT)
Dept: ENDOCRINOLOGY | Age: 39
End: 2024-10-10
Payer: COMMERCIAL

## 2024-10-10 VITALS
DIASTOLIC BLOOD PRESSURE: 97 MMHG | HEIGHT: 65 IN | WEIGHT: 250 LBS | BODY MASS INDEX: 41.65 KG/M2 | SYSTOLIC BLOOD PRESSURE: 145 MMHG | HEART RATE: 90 BPM

## 2024-10-10 DIAGNOSIS — E28.2 PCOS (POLYCYSTIC OVARIAN SYNDROME): ICD-10-CM

## 2024-10-10 DIAGNOSIS — I10 PRIMARY HYPERTENSION: ICD-10-CM

## 2024-10-10 DIAGNOSIS — E03.9 HYPOTHYROIDISM (ACQUIRED): Primary | ICD-10-CM

## 2024-10-10 PROCEDURE — 3077F SYST BP >= 140 MM HG: CPT | Performed by: INTERNAL MEDICINE

## 2024-10-10 PROCEDURE — 3080F DIAST BP >= 90 MM HG: CPT | Performed by: INTERNAL MEDICINE

## 2024-10-10 PROCEDURE — 99214 OFFICE O/P EST MOD 30 MIN: CPT | Performed by: INTERNAL MEDICINE

## 2024-10-10 NOTE — PROGRESS NOTES
pre-surgery 290 lbs, bijan of around 210 lbs. Current  weight is 250 lbs.  She has 2 sons.  She does not smoke and drinks alcohol rarely.  No illicit drug use.      ALLERGIES:  No Known Allergies     MEDICATIONS:  Outpatient Medications Marked as Taking for the 10/10/24 encounter (Office Visit) with Karishma Oviedo MD   Medication Sig Dispense Refill    omeprazole (PRILOSEC) 40 MG delayed release capsule TAKE 1 CAPSULE BY MOUTH EVERY DAY IN THE MORNING 90 capsule 0    metFORMIN (GLUCOPHAGE-XR) 500 MG extended release tablet 1 tablet twice a day 180 tablet 1    levothyroxine (SYNTHROID) 25 MCG tablet TAKE 1 TABLET BY MOUTH EVERY DAY 90 tablet 3    Prenatal Vit-FePoly-FA-DHA (VITAFOL-ONE) 29-1-200 MG CAPS Take 1 tablet by mouth daily      L-Methylfolate-Algae (L-METHYLFOLATE FORMULA 7.5 PO) Take by mouth      valACYclovir (VALTREX) 1 g tablet TAKE 2 TABLETS BY MOUTH TWICE A DAY FOR 1 DAY 4 tablet 0    hydrOXYzine HCl (ATARAX) 25 MG tablet       acetaminophen (TYLENOL) 325 MG tablet Take 2 tablets by mouth as needed for Pain          PAST MEDICAL HISTORY:  Past Medical History:   Diagnosis Date    Abnormal Pap smear of cervix     prior LEEP.    Arthritis     upper cervical area pain    Carpal tunnel syndrome of right wrist 05/10/2017    Gestational diabetes     pre-diabetic--not medically treated    Hypothyroidism     Iron deficiency anemia     Obesity, unspecified     Pancreatitis     PCOS (polycystic ovarian syndrome)     PVC (premature ventricular contraction)     Sleep apnea     requires CPAP while sleeping.        PAST SURGICAL HISTORY:  Past Surgical History:   Procedure Laterality Date    CARPAL TUNNEL RELEASE Right 2017    Right carpal tunnel release       SECTION  2016     SECTION  2024    CHOLECYSTECTOMY, LAPAROSCOPIC  2010    [facility]    LEEP  2014    [facility]    SHOULDER ARTHROSCOPY Left 10/21/2022    LEFT SHOULDER ARTHROSCOPY; DISTAL CLAVICLE EXCISION-BLOCK performed

## 2024-12-07 DIAGNOSIS — K21.9 CHRONIC GERD: ICD-10-CM

## 2024-12-09 RX ORDER — OMEPRAZOLE 40 MG/1
40 CAPSULE, DELAYED RELEASE ORAL EVERY MORNING
Qty: 30 CAPSULE | Refills: 0 | Status: SHIPPED | OUTPATIENT
Start: 2024-12-09 | End: 2025-01-08

## 2024-12-09 NOTE — TELEPHONE ENCOUNTER
Medication:   Requested Prescriptions     Pending Prescriptions Disp Refills    omeprazole (PRILOSEC) 40 MG delayed release capsule [Pharmacy Med Name: OMEPRAZOLE DR 40 MG CAPSULE] 90 capsule 0     Sig: TAKE 1 CAPSULE BY MOUTH EVERY DAY IN THE MORNING        Last Filled:      Patient Phone Number: [phone] (home)     Last appt: 1/4/2024   Next appt: Visit date not found    Last OARRS:        No data to display

## 2024-12-26 ENCOUNTER — OFFICE VISIT (OUTPATIENT)
Dept: FAMILY MEDICINE CLINIC | Age: 39
End: 2024-12-26

## 2024-12-26 VITALS
DIASTOLIC BLOOD PRESSURE: 82 MMHG | WEIGHT: 256.8 LBS | HEART RATE: 93 BPM | BODY MASS INDEX: 42.73 KG/M2 | SYSTOLIC BLOOD PRESSURE: 118 MMHG | OXYGEN SATURATION: 98 %

## 2024-12-26 DIAGNOSIS — L30.9 DERMATITIS: Primary | ICD-10-CM

## 2024-12-26 RX ORDER — CEPHALEXIN 500 MG/1
500 CAPSULE ORAL 3 TIMES DAILY
Qty: 21 CAPSULE | Refills: 0 | Status: SHIPPED | OUTPATIENT
Start: 2024-12-26 | End: 2025-01-02

## 2024-12-26 SDOH — ECONOMIC STABILITY: FOOD INSECURITY: WITHIN THE PAST 12 MONTHS, THE FOOD YOU BOUGHT JUST DIDN'T LAST AND YOU DIDN'T HAVE MONEY TO GET MORE.: NEVER TRUE

## 2024-12-26 SDOH — ECONOMIC STABILITY: FOOD INSECURITY: WITHIN THE PAST 12 MONTHS, YOU WORRIED THAT YOUR FOOD WOULD RUN OUT BEFORE YOU GOT MONEY TO BUY MORE.: NEVER TRUE

## 2024-12-26 SDOH — ECONOMIC STABILITY: INCOME INSECURITY: HOW HARD IS IT FOR YOU TO PAY FOR THE VERY BASICS LIKE FOOD, HOUSING, MEDICAL CARE, AND HEATING?: NOT HARD AT ALL

## 2024-12-26 ASSESSMENT — PATIENT HEALTH QUESTIONNAIRE - PHQ9
5. POOR APPETITE OR OVEREATING: NOT AT ALL
1. LITTLE INTEREST OR PLEASURE IN DOING THINGS: NOT AT ALL
SUM OF ALL RESPONSES TO PHQ9 QUESTIONS 1 & 2: 0
8. MOVING OR SPEAKING SO SLOWLY THAT OTHER PEOPLE COULD HAVE NOTICED. OR THE OPPOSITE, BEING SO FIGETY OR RESTLESS THAT YOU HAVE BEEN MOVING AROUND A LOT MORE THAN USUAL: NOT AT ALL
2. FEELING DOWN, DEPRESSED OR HOPELESS: NOT AT ALL
4. FEELING TIRED OR HAVING LITTLE ENERGY: NOT AT ALL
6. FEELING BAD ABOUT YOURSELF - OR THAT YOU ARE A FAILURE OR HAVE LET YOURSELF OR YOUR FAMILY DOWN: NOT AT ALL
SUM OF ALL RESPONSES TO PHQ QUESTIONS 1-9: 0
9. THOUGHTS THAT YOU WOULD BE BETTER OFF DEAD, OR OF HURTING YOURSELF: NOT AT ALL
SUM OF ALL RESPONSES TO PHQ QUESTIONS 1-9: 0
SUM OF ALL RESPONSES TO PHQ QUESTIONS 1-9: 0
7. TROUBLE CONCENTRATING ON THINGS, SUCH AS READING THE NEWSPAPER OR WATCHING TELEVISION: NOT AT ALL
3. TROUBLE FALLING OR STAYING ASLEEP: NOT AT ALL
SUM OF ALL RESPONSES TO PHQ QUESTIONS 1-9: 0
10. IF YOU CHECKED OFF ANY PROBLEMS, HOW DIFFICULT HAVE THESE PROBLEMS MADE IT FOR YOU TO DO YOUR WORK, TAKE CARE OF THINGS AT HOME, OR GET ALONG WITH OTHER PEOPLE: NOT DIFFICULT AT ALL

## 2024-12-26 NOTE — PROGRESS NOTES
Mallika Pete (:  1985) is a 39 y.o. female,Established patient, here for evaluation of the following chief complaint(s):  Rash (Rash on neck x 1 month ), ADHD (Discuss starting medication), and Hormone Issues (Discuss bloodwork)         Assessment & Plan  Dermatitis   Acute condition, new, will treat empirically for bacterial infection with:    Orders:    cephALEXin (KEFLEX) 500 MG capsule; Take 1 capsule by mouth 3 times daily for 7 days    Keep area clean and dry. If shaving area- shave with grain not against. I recommend replacing blade.     Return if symptoms worsen or fail to improve.  See pt instructions  Discussed use, benefit, and side effects of prescribed medications. All patient questions answered.  Pt voiced understanding.          Subjective   HPI    Patient presenting for evaluation of rash. Started 1 month ago to right side of chin. Now spreading down to neck. (+) pain, no itching  No fevers. Will bleed if scab is rubbed off.   Son recently had impetigo.   Pt experiencing hair growth and shaving area.     Patient's allergies, past medical, surgical, social and family histories were reviewed and updated as appropriate.    Prior to Visit Medications    Medication Sig Taking? Authorizing Provider   cephALEXin (KEFLEX) 500 MG capsule Take 1 capsule by mouth 3 times daily for 7 days Yes Suri Hall APRN - CNP   omeprazole (PRILOSEC) 40 MG delayed release capsule Take 1 capsule by mouth every morning Yes Suri Hall APRN - CNP   metFORMIN (GLUCOPHAGE-XR) 500 MG extended release tablet 1 tablet twice a day Yes Karishma Oviedo MD   levothyroxine (SYNTHROID) 25 MCG tablet TAKE 1 TABLET BY MOUTH EVERY DAY Yes Suri Hall APRN - CNP   Prenatal Vit-FePoly-FA-DHA (VITAFOL-ONE) 29-1-200 MG CAPS Take 1 tablet by mouth daily Yes ProviderLexx MD   L-Methylfolate-Algae (L-METHYLFOLATE FORMULA 7.5 PO) Take by mouth Yes Lexx Ayala MD   valACYclovir (VALTREX) 1 g tablet

## 2025-01-02 DIAGNOSIS — K21.9 CHRONIC GERD: ICD-10-CM

## 2025-01-02 NOTE — TELEPHONE ENCOUNTER
Last OV: 12/26/2024  Next OV: Visit date not found    Next appointment due:    Last fill:12/09/2024  Refills:  30/0

## 2025-01-03 RX ORDER — OMEPRAZOLE 40 MG/1
40 CAPSULE, DELAYED RELEASE ORAL EVERY MORNING
Qty: 90 CAPSULE | Refills: 1 | Status: SHIPPED | OUTPATIENT
Start: 2025-01-03

## 2025-01-29 ENCOUNTER — PATIENT MESSAGE (OUTPATIENT)
Dept: ENDOCRINOLOGY | Age: 40
End: 2025-01-29

## 2025-01-29 DIAGNOSIS — E28.2 PCOS (POLYCYSTIC OVARIAN SYNDROME): ICD-10-CM

## 2025-01-29 DIAGNOSIS — E03.9 HYPOTHYROIDISM (ACQUIRED): Primary | ICD-10-CM

## 2025-01-30 RX ORDER — METFORMIN HYDROCHLORIDE 500 MG/1
TABLET, EXTENDED RELEASE ORAL
Qty: 360 TABLET | Refills: 1 | Status: SHIPPED | OUTPATIENT
Start: 2025-01-30 | End: 2025-01-31 | Stop reason: SDUPTHER

## 2025-01-30 RX ORDER — LEVOTHYROXINE SODIUM 50 UG/1
50 TABLET ORAL DAILY
Qty: 90 TABLET | Refills: 1 | Status: SHIPPED | OUTPATIENT
Start: 2025-01-30

## 2025-01-31 RX ORDER — METFORMIN HYDROCHLORIDE 500 MG/1
TABLET, EXTENDED RELEASE ORAL
Qty: 360 TABLET | Refills: 1 | Status: SHIPPED | OUTPATIENT
Start: 2025-01-31

## 2025-04-03 DIAGNOSIS — E03.9 HYPOTHYROIDISM (ACQUIRED): ICD-10-CM

## 2025-04-03 DIAGNOSIS — E28.2 PCOS (POLYCYSTIC OVARIAN SYNDROME): ICD-10-CM

## 2025-04-03 LAB
ALBUMIN SERPL-MCNC: 4.4 G/DL (ref 3.4–5)
ALBUMIN/GLOB SERPL: 1.6 {RATIO} (ref 1.1–2.2)
ALP SERPL-CCNC: 42 U/L (ref 40–129)
ALT SERPL-CCNC: 61 U/L (ref 10–40)
ANION GAP SERPL CALCULATED.3IONS-SCNC: 12 MMOL/L (ref 3–16)
AST SERPL-CCNC: 31 U/L (ref 15–37)
BILIRUB SERPL-MCNC: 0.5 MG/DL (ref 0–1)
BUN SERPL-MCNC: 10 MG/DL (ref 7–20)
CALCIUM SERPL-MCNC: 9.4 MG/DL (ref 8.3–10.6)
CHLORIDE SERPL-SCNC: 105 MMOL/L (ref 99–110)
CO2 SERPL-SCNC: 23 MMOL/L (ref 21–32)
CREAT SERPL-MCNC: 0.7 MG/DL (ref 0.6–1.1)
EST. AVERAGE GLUCOSE BLD GHB EST-MCNC: 111.2 MG/DL
GFR SERPLBLD CREATININE-BSD FMLA CKD-EPI: >90 ML/MIN/{1.73_M2}
GLUCOSE SERPL-MCNC: 102 MG/DL (ref 70–99)
HBA1C MFR BLD: 5.5 %
POTASSIUM SERPL-SCNC: 3.9 MMOL/L (ref 3.5–5.1)
PROT SERPL-MCNC: 7.2 G/DL (ref 6.4–8.2)
SODIUM SERPL-SCNC: 140 MMOL/L (ref 136–145)
T4 FREE SERPL-MCNC: 0.9 NG/DL (ref 0.9–1.8)
TSH SERPL DL<=0.005 MIU/L-ACNC: 5.91 UIU/ML (ref 0.27–4.2)

## 2025-04-04 ENCOUNTER — RESULTS FOLLOW-UP (OUTPATIENT)
Dept: ENDOCRINOLOGY | Age: 40
End: 2025-04-04

## 2025-04-10 ENCOUNTER — OFFICE VISIT (OUTPATIENT)
Dept: ENDOCRINOLOGY | Age: 40
End: 2025-04-10
Payer: COMMERCIAL

## 2025-04-10 VITALS
WEIGHT: 252 LBS | HEIGHT: 66 IN | BODY MASS INDEX: 40.5 KG/M2 | DIASTOLIC BLOOD PRESSURE: 80 MMHG | SYSTOLIC BLOOD PRESSURE: 126 MMHG | HEART RATE: 88 BPM

## 2025-04-10 DIAGNOSIS — E03.9 HYPOTHYROIDISM (ACQUIRED): Primary | ICD-10-CM

## 2025-04-10 DIAGNOSIS — E66.813 CLASS 3 SEVERE OBESITY DUE TO EXCESS CALORIES WITH SERIOUS COMORBIDITY AND BODY MASS INDEX (BMI) OF 40.0 TO 44.9 IN ADULT: ICD-10-CM

## 2025-04-10 DIAGNOSIS — R73.03 PREDIABETES: ICD-10-CM

## 2025-04-10 PROCEDURE — 3079F DIAST BP 80-89 MM HG: CPT | Performed by: INTERNAL MEDICINE

## 2025-04-10 PROCEDURE — 3074F SYST BP LT 130 MM HG: CPT | Performed by: INTERNAL MEDICINE

## 2025-04-10 PROCEDURE — 99214 OFFICE O/P EST MOD 30 MIN: CPT | Performed by: INTERNAL MEDICINE

## 2025-04-10 RX ORDER — LISDEXAMFETAMINE DIMESYLATE 50 MG/1
50 CAPSULE ORAL EVERY MORNING
COMMUNITY
Start: 2025-02-22

## 2025-04-10 RX ORDER — LEVOTHYROXINE SODIUM 75 UG/1
75 TABLET ORAL DAILY
Qty: 90 TABLET | Refills: 1 | Status: SHIPPED | OUTPATIENT
Start: 2025-04-10

## 2025-04-10 NOTE — PROGRESS NOTES
Positive        ASSESSMENT/PLAN:    1. Hypothyroidism due to Hashimoto's thyroiditis  Continue levothyroxine 50 mcg daily.  TSH above target.  Will increase dose of levothyroxine to 75 mcg daily and plan on updating TSH and free T4 in 2 months.  Will aim to keep TSH below 2.5 given plans for pregnancy.    -     T4, Free; Future  -     TSH; Future    2. PCOS (polycystic ovarian syndrome)  Currently on metformin XR 1 500 mg daily.  Continue on current regimen.  Update A1c before next visit.      3.  Difficulty swallowing, update thyroid ultrasound.  Exam is benign.        Return in about 6 months (around 10/10/2025) for Hypothyroidism.      Electronically signed by Karishma Oviedo MD on 4/10/2025 at 3:15 PM    Thank you very much for allowing me to take care of this patient along with you.    Comment: This documentation utilized a software-based speech recognition technology (voice-to-text process), where occasional errors in transcription can occur.

## 2025-04-22 ENCOUNTER — HOSPITAL ENCOUNTER (OUTPATIENT)
Dept: ULTRASOUND IMAGING | Age: 40
Discharge: HOME | End: 2025-04-22
Payer: COMMERCIAL

## 2025-04-22 DIAGNOSIS — E03.9 HYPOTHYROIDISM (ACQUIRED): ICD-10-CM

## 2025-04-22 PROCEDURE — 76536 US EXAM OF HEAD AND NECK: CPT

## 2025-04-23 ENCOUNTER — RESULTS FOLLOW-UP (OUTPATIENT)
Dept: ENDOCRINOLOGY | Age: 40
End: 2025-04-23

## 2025-07-02 DIAGNOSIS — K21.9 CHRONIC GERD: ICD-10-CM

## 2025-07-02 RX ORDER — OMEPRAZOLE 40 MG/1
40 CAPSULE, DELAYED RELEASE ORAL EVERY MORNING
Qty: 90 CAPSULE | Refills: 1 | Status: SHIPPED | OUTPATIENT
Start: 2025-07-02

## 2025-07-02 NOTE — TELEPHONE ENCOUNTER
Medication:   Requested Prescriptions     Pending Prescriptions Disp Refills    omeprazole (PRILOSEC) 40 MG delayed release capsule [Pharmacy Med Name: OMEPRAZOLE DR 40 MG CAPSULE] 90 capsule 1     Sig: TAKE 1 CAPSULE BY MOUTH EVERY DAY IN THE MORNING        Last Filled:  01/03/2025    Patient Phone Number: [phone] (home)     Last appt: 12/26/2024   Next appt: Visit date not found    Last OARRS:        No data to display                  
This was a shared visit with the CAROLIN. I reviewed and verified the documentation and independently performed the documented:

## 2025-08-21 ENCOUNTER — PATIENT MESSAGE (OUTPATIENT)
Dept: FAMILY MEDICINE CLINIC | Age: 40
End: 2025-08-21

## 2025-08-21 RX ORDER — VALACYCLOVIR HYDROCHLORIDE 1 G/1
1000 TABLET, FILM COATED ORAL 2 TIMES DAILY
Qty: 4 TABLET | Refills: 0 | Status: SHIPPED | OUTPATIENT
Start: 2025-08-21

## (undated) DEVICE — PACK PROCEDURE SURG SHLDR MFFOP CUST

## (undated) DEVICE — GLOVE SURG SZ 75 L12IN FNGR THK79MIL GRN LTX FREE

## (undated) DEVICE — INCISOR PLUS PLATINUM 4.5 MM BLADE: Brand: DYONICS INCISOR

## (undated) DEVICE — SUTURE ETHLN SZ 3-0 L18IN NONABSORBABLE BLK PS-2 L19MM 3/8 1669H

## (undated) DEVICE — GLOVE ORANGE PI 7 1/2   MSG9075

## (undated) DEVICE — SHEET,DRAPE,53X77,STERILE: Brand: MEDLINE

## (undated) DEVICE — 3M™ STERI-DRAPE™ U-DRAPE 1067 1067 5/BX 4BX/CS/CTN&#X20;: Brand: STERI-DRAPE™

## (undated) DEVICE — DRESSING,GAUZE,XEROFORM,CURAD,5"X9",ST: Brand: CURAD

## (undated) DEVICE — 4.0 MM ELITE ABRADER STRAIGHT                                    DISPOSABLE BURRS, AQUA, 10000                                    MAXIMUM RPM, PACKAGED 6 PER BOX, STERILE

## (undated) DEVICE — SHOULDER STABILIZATION KIT,                                    DISPOSABLE 12 PER BOX

## (undated) DEVICE — DRESSING,GAUZE,XEROFORM,CURAD,1"X8",ST: Brand: CURAD

## (undated) DEVICE — WEREWOLF FLOW 90 COBLATION WAND: Brand: COBLATION

## (undated) DEVICE — PACK,SHOULDER,DRAPE,POUCH: Brand: MEDLINE

## (undated) DEVICE — BOWL 32OZ-LF: Brand: MEDLINE INDUSTRIES, INC.

## (undated) DEVICE — BLANKET WRM W40.2XL55.9IN IORT LO BODY + MISTRAL AIR

## (undated) DEVICE — T-MAX DISPOSABLE FACE MASK 8 PER BOX

## (undated) DEVICE — GAUZE,SPONGE,4"X4",8PLY,STRL,LF,10/TRAY: Brand: MEDLINE

## (undated) DEVICE — DYONICS 25 INFLOW TUBE SET, 3 PER BOX

## (undated) DEVICE — APPLICATOR MEDICATED 26 CC SOLUTION HI LT ORNG CHLORAPREP

## (undated) DEVICE — DRAPE,U/ SHT,SPLIT,PLAS,STERIL: Brand: MEDLINE